# Patient Record
Sex: MALE | Race: WHITE | Employment: OTHER | URBAN - METROPOLITAN AREA
[De-identification: names, ages, dates, MRNs, and addresses within clinical notes are randomized per-mention and may not be internally consistent; named-entity substitution may affect disease eponyms.]

---

## 2018-03-06 RX ORDER — FENOFIBRATE 160 MG/1
160 TABLET ORAL DAILY
Qty: 30 TABLET | Refills: 1 | Status: SHIPPED | OUTPATIENT
Start: 2018-03-06 | End: 2018-04-23 | Stop reason: SDUPTHER

## 2018-03-06 RX ORDER — TESTOSTERONE CYPIONATE 200 MG/ML
200 INJECTION INTRAMUSCULAR
Qty: 2 ML | Refills: 1 | OUTPATIENT
Start: 2018-03-06 | End: 2018-04-23 | Stop reason: SDUPTHER

## 2018-03-06 NOTE — TELEPHONE ENCOUNTER
Spoke with the Patients wife and she stated that the patient needs a refill of the following medications. Fenofibrate 160 mg QD  Testosterone 200 mg/mL Inject 1 mL every 2 weeks    Patients wife stated that the patient has been out of medication for 2 months.

## 2018-03-12 RX ORDER — TESTOSTERONE CYPIONATE 200 MG/ML
INJECTION INTRAMUSCULAR
Qty: 1 ML | Refills: 0 | OUTPATIENT
Start: 2018-03-12

## 2018-04-23 ENCOUNTER — OFFICE VISIT (OUTPATIENT)
Dept: ENDOCRINOLOGY | Age: 63
End: 2018-04-23

## 2018-04-23 VITALS
DIASTOLIC BLOOD PRESSURE: 86 MMHG | OXYGEN SATURATION: 96 % | BODY MASS INDEX: 34.65 KG/M2 | SYSTOLIC BLOOD PRESSURE: 122 MMHG | HEIGHT: 70 IN | WEIGHT: 242 LBS | HEART RATE: 61 BPM

## 2018-04-23 DIAGNOSIS — E55.9 VITAMIN D DEFICIENCY: ICD-10-CM

## 2018-04-23 DIAGNOSIS — E66.9 CLASS 1 OBESITY WITH SERIOUS COMORBIDITY AND BODY MASS INDEX (BMI) OF 34.0 TO 34.9 IN ADULT, UNSPECIFIED OBESITY TYPE: ICD-10-CM

## 2018-04-23 DIAGNOSIS — E11.40 TYPE 2 DIABETES, CONTROLLED, WITH NEUROPATHY (HCC): Primary | ICD-10-CM

## 2018-04-23 DIAGNOSIS — E78.2 MIXED HYPERLIPIDEMIA: ICD-10-CM

## 2018-04-23 DIAGNOSIS — E23.0 HYPOGONADOTROPIC HYPOGONADISM (HCC): ICD-10-CM

## 2018-04-23 PROBLEM — E66.811 CLASS 1 OBESITY IN ADULT: Status: ACTIVE | Noted: 2018-04-23

## 2018-04-23 LAB — HBA1C MFR BLD: 6.7 %

## 2018-04-23 PROCEDURE — 99214 OFFICE O/P EST MOD 30 MIN: CPT | Performed by: INTERNAL MEDICINE

## 2018-04-23 PROCEDURE — 83036 HEMOGLOBIN GLYCOSYLATED A1C: CPT | Performed by: INTERNAL MEDICINE

## 2018-04-23 RX ORDER — FENOFIBRATE 160 MG/1
160 TABLET ORAL DAILY
Qty: 90 TABLET | Refills: 1 | Status: SHIPPED | OUTPATIENT
Start: 2018-04-23 | End: 2018-10-17 | Stop reason: SDUPTHER

## 2018-04-23 RX ORDER — CABERGOLINE 0.5 MG/1
0.25 TABLET ORAL
COMMUNITY
End: 2018-04-23 | Stop reason: ALTCHOICE

## 2018-04-23 RX ORDER — TRIAMCINOLONE ACETONIDE 1 MG/G
CREAM TOPICAL 2 TIMES DAILY
COMMUNITY
End: 2018-07-25 | Stop reason: ALTCHOICE

## 2018-04-23 RX ORDER — TESTOSTERONE CYPIONATE 200 MG/ML
200 INJECTION INTRAMUSCULAR
Qty: 2 ML | Refills: 3 | Status: SHIPPED | OUTPATIENT
Start: 2018-04-23 | End: 2018-11-21 | Stop reason: SDUPTHER

## 2018-04-23 RX ORDER — PROPRANOLOL HYDROCHLORIDE 80 MG/1
80 TABLET ORAL DAILY
COMMUNITY

## 2018-04-23 RX ORDER — CINNAMON BARK
500 POWDER (GRAM) MISCELLANEOUS DAILY
Status: ON HOLD | COMMUNITY
End: 2021-09-24

## 2018-04-23 ASSESSMENT — PATIENT HEALTH QUESTIONNAIRE - PHQ9
1. LITTLE INTEREST OR PLEASURE IN DOING THINGS: 0
2. FEELING DOWN, DEPRESSED OR HOPELESS: 0
SUM OF ALL RESPONSES TO PHQ9 QUESTIONS 1 & 2: 0
SUM OF ALL RESPONSES TO PHQ QUESTIONS 1-9: 0

## 2018-04-24 DIAGNOSIS — E23.0 HYPOGONADOTROPIC HYPOGONADISM (HCC): ICD-10-CM

## 2018-04-25 RX ORDER — TESTOSTERONE CYPIONATE 200 MG/ML
INJECTION INTRAMUSCULAR
Qty: 1 ML | Refills: 0 | OUTPATIENT
Start: 2018-04-25

## 2018-05-10 ENCOUNTER — TELEPHONE (OUTPATIENT)
Dept: ENDOCRINOLOGY | Age: 63
End: 2018-05-10

## 2018-05-10 DIAGNOSIS — E11.40 TYPE 2 DIABETES, CONTROLLED, WITH NEUROPATHY (HCC): Primary | ICD-10-CM

## 2018-07-19 DIAGNOSIS — E11.40 TYPE 2 DIABETES, CONTROLLED, WITH NEUROPATHY (HCC): ICD-10-CM

## 2018-07-19 DIAGNOSIS — E78.2 MIXED HYPERLIPIDEMIA: ICD-10-CM

## 2018-07-19 DIAGNOSIS — E23.0 HYPOGONADOTROPIC HYPOGONADISM (HCC): ICD-10-CM

## 2018-07-19 DIAGNOSIS — E55.9 VITAMIN D DEFICIENCY: ICD-10-CM

## 2018-07-19 LAB
A/G RATIO: 2 (ref 1.1–2.2)
ALBUMIN SERPL-MCNC: 4.9 G/DL (ref 3.4–5)
ALP BLD-CCNC: 93 U/L (ref 40–129)
ALT SERPL-CCNC: 67 U/L (ref 10–40)
ANION GAP SERPL CALCULATED.3IONS-SCNC: 13 MMOL/L (ref 3–16)
AST SERPL-CCNC: 48 U/L (ref 15–37)
BILIRUB SERPL-MCNC: 0.3 MG/DL (ref 0–1)
BUN BLDV-MCNC: 19 MG/DL (ref 7–20)
CALCIUM SERPL-MCNC: 9.7 MG/DL (ref 8.3–10.6)
CHLORIDE BLD-SCNC: 100 MMOL/L (ref 99–110)
CHOLESTEROL, TOTAL: 248 MG/DL (ref 0–199)
CO2: 25 MMOL/L (ref 21–32)
CREAT SERPL-MCNC: 0.8 MG/DL (ref 0.8–1.3)
CREATININE URINE: 139.7 MG/DL (ref 39–259)
GFR AFRICAN AMERICAN: >60
GFR NON-AFRICAN AMERICAN: >60
GLOBULIN: 2.4 G/DL
GLUCOSE BLD-MCNC: 151 MG/DL (ref 70–99)
HCT VFR BLD CALC: 47.9 % (ref 40.5–52.5)
HDLC SERPL-MCNC: 26 MG/DL (ref 40–60)
HEMOGLOBIN: 15.6 G/DL (ref 13.5–17.5)
LDL CHOLESTEROL CALCULATED: ABNORMAL MG/DL
LDL CHOLESTEROL DIRECT: 164 MG/DL
MCH RBC QN AUTO: 29.6 PG (ref 26–34)
MCHC RBC AUTO-ENTMCNC: 32.7 G/DL (ref 31–36)
MCV RBC AUTO: 90.5 FL (ref 80–100)
MICROALBUMIN UR-MCNC: 4.2 MG/DL
MICROALBUMIN/CREAT UR-RTO: 30.1 MG/G (ref 0–30)
PDW BLD-RTO: 14.4 % (ref 12.4–15.4)
PLATELET # BLD: 273 K/UL (ref 135–450)
PMV BLD AUTO: 8.8 FL (ref 5–10.5)
POTASSIUM SERPL-SCNC: 4.7 MMOL/L (ref 3.5–5.1)
RBC # BLD: 5.29 M/UL (ref 4.2–5.9)
SODIUM BLD-SCNC: 138 MMOL/L (ref 136–145)
TOTAL PROTEIN: 7.3 G/DL (ref 6.4–8.2)
TRIGL SERPL-MCNC: 419 MG/DL (ref 0–150)
VITAMIN D 25-HYDROXY: 52.9 NG/ML
VLDLC SERPL CALC-MCNC: ABNORMAL MG/DL
WBC # BLD: 7.9 K/UL (ref 4–11)

## 2018-07-20 LAB
ESTIMATED AVERAGE GLUCOSE: 162.8 MG/DL
HBA1C MFR BLD: 7.3 %

## 2018-07-25 ENCOUNTER — OFFICE VISIT (OUTPATIENT)
Dept: ENDOCRINOLOGY | Age: 63
End: 2018-07-25

## 2018-07-25 VITALS
DIASTOLIC BLOOD PRESSURE: 72 MMHG | SYSTOLIC BLOOD PRESSURE: 120 MMHG | WEIGHT: 247.2 LBS | OXYGEN SATURATION: 98 % | HEIGHT: 70 IN | HEART RATE: 62 BPM | BODY MASS INDEX: 35.39 KG/M2

## 2018-07-25 DIAGNOSIS — E55.9 VITAMIN D DEFICIENCY: ICD-10-CM

## 2018-07-25 DIAGNOSIS — E78.2 MIXED HYPERLIPIDEMIA: ICD-10-CM

## 2018-07-25 DIAGNOSIS — E23.0 HYPOGONADOTROPIC HYPOGONADISM (HCC): ICD-10-CM

## 2018-07-25 PROCEDURE — 99214 OFFICE O/P EST MOD 30 MIN: CPT | Performed by: INTERNAL MEDICINE

## 2018-07-25 NOTE — PROGRESS NOTES
José Castillo is a 66 y.o. male who presents for Type 2 diabetes mellitus. Current symptoms/problems include hyperglycemia and show no change. 1.Type 2 diabetes, uncontrolled, with neuropathy ([facility] [address] [E11.40, E11.65]    Diagnosed with Type 2 diabetes mellitus in 2008.     Comorbid conditions: hyperlipidemia and Neuropathy    Current diabetic medications include: metformin    Intolerance to diabetes medications: No     Weight trend: stable  Prior visit with dietician: yes  Current diet: on average, 3 meals per day  Current exercise: frequent     Current monitoring regimen: home blood tests - 1 times daily  Has brought blood glucose log/meter:  No  Home blood sugar records: fasting range:  and postprandial range:    Any episodes of hypoglycemia? No  Hypoglycemia frequency and time(s):    Does patient have Glucagon emergency kit? No  Does patient have rapid acting carbohydrate? No  Does patient wear a medic alert bracelet or necklace? No    2. Mixed hyperlipidemia  No muscle pain. Has cramps. 3. Hypogonadotropic hypogonadism (HCC)  Has weakness. 4. Vitamin D deficiency  No bone pain. 5. Class 1 obesity with serious comorbidity and body mass index (BMI) of 35.0 to 35.9 in adult, unspecified obesity type  Eats healthy, exercises.       Lab Results   Component Value Date    LABA1C 7.3 07/19/2018     Lab Results   Component Value Date    .8 07/19/2018         Past Medical History:   Diagnosis Date    Bilateral swelling of feet     Generalized headaches     Goiter     Hemorrhoids     Hiatal hernia     Hyperlipidemia     Impaired fasting glucose     Neuropathy (HCC)     Other and unspecified anterior pituitary hyperfunction     Other anterior pituitary disorders     Other testicular hypofunction     Other testicular hypofunction     Parkinson's disease     Reflux     Type II or unspecified type diabetes mellitus with neurological manifestations, uncontrolled(250.62)    

## 2018-07-27 LAB
REASON FOR REJECTION: NORMAL
REJECTED TEST: NORMAL

## 2018-07-30 ENCOUNTER — TELEPHONE (OUTPATIENT)
Dept: ENDOCRINOLOGY | Age: 63
End: 2018-07-30

## 2018-07-30 DIAGNOSIS — E23.0 HYPOGONADOTROPIC HYPOGONADISM (HCC): Primary | ICD-10-CM

## 2018-07-30 NOTE — TELEPHONE ENCOUNTER
Song Hassan called from [facility] to let us know that the test for the testosterone total for the pt was ruined with Fed-X, the dry ice failed. She wants to know should she call the pt back for another test? If so she will need a new order for it.  She can be reached at [phone]

## 2018-08-02 ENCOUNTER — TELEPHONE (OUTPATIENT)
Dept: ENDOCRINOLOGY | Age: 63
End: 2018-08-02

## 2018-08-15 DIAGNOSIS — E23.0 HYPOGONADOTROPIC HYPOGONADISM (HCC): ICD-10-CM

## 2018-08-17 LAB
SEX HORMONE BINDING GLOBULIN: 20 NMOL/L (ref 11–80)
TESTOSTERONE FREE-NONMALE: 43.9 PG/ML (ref 47–244)
TESTOSTERONE TOTAL: 179 NG/DL (ref 220–1000)

## 2018-10-17 DIAGNOSIS — E78.2 MIXED HYPERLIPIDEMIA: ICD-10-CM

## 2018-10-17 RX ORDER — FENOFIBRATE 160 MG/1
TABLET ORAL
Qty: 90 TABLET | Refills: 0 | Status: SHIPPED | OUTPATIENT
Start: 2018-10-17 | End: 2019-01-11 | Stop reason: SDUPTHER

## 2018-11-12 DIAGNOSIS — E11.40 TYPE 2 DIABETES, CONTROLLED, WITH NEUROPATHY (HCC): ICD-10-CM

## 2018-11-15 DIAGNOSIS — E23.0 HYPOGONADOTROPIC HYPOGONADISM (HCC): ICD-10-CM

## 2018-11-15 DIAGNOSIS — E55.9 VITAMIN D DEFICIENCY: ICD-10-CM

## 2018-11-15 DIAGNOSIS — E78.2 MIXED HYPERLIPIDEMIA: ICD-10-CM

## 2018-11-15 LAB
A/G RATIO: 1.8 (ref 1.1–2.2)
ALBUMIN SERPL-MCNC: 4.7 G/DL (ref 3.4–5)
ALP BLD-CCNC: 136 U/L (ref 40–129)
ALT SERPL-CCNC: 44 U/L (ref 10–40)
ANION GAP SERPL CALCULATED.3IONS-SCNC: 16 MMOL/L (ref 3–16)
AST SERPL-CCNC: 38 U/L (ref 15–37)
BILIRUB SERPL-MCNC: 0.3 MG/DL (ref 0–1)
BUN BLDV-MCNC: 18 MG/DL (ref 7–20)
CALCIUM SERPL-MCNC: 10.1 MG/DL (ref 8.3–10.6)
CHLORIDE BLD-SCNC: 97 MMOL/L (ref 99–110)
CHOLESTEROL, TOTAL: 278 MG/DL (ref 0–199)
CO2: 26 MMOL/L (ref 21–32)
CREAT SERPL-MCNC: 0.8 MG/DL (ref 0.8–1.3)
CREATININE URINE: 251.7 MG/DL (ref 39–259)
GFR AFRICAN AMERICAN: >60
GFR NON-AFRICAN AMERICAN: >60
GLOBULIN: 2.6 G/DL
GLUCOSE BLD-MCNC: 254 MG/DL (ref 70–99)
HCT VFR BLD CALC: 45.6 % (ref 40.5–52.5)
HDLC SERPL-MCNC: 23 MG/DL (ref 40–60)
HEMOGLOBIN: 15 G/DL (ref 13.5–17.5)
LDL CHOLESTEROL CALCULATED: ABNORMAL MG/DL
LDL CHOLESTEROL DIRECT: 154 MG/DL
MCH RBC QN AUTO: 29.6 PG (ref 26–34)
MCHC RBC AUTO-ENTMCNC: 33 G/DL (ref 31–36)
MCV RBC AUTO: 89.7 FL (ref 80–100)
MICROALBUMIN UR-MCNC: 35.2 MG/DL
MICROALBUMIN/CREAT UR-RTO: 139.8 MG/G (ref 0–30)
PDW BLD-RTO: 14.3 % (ref 12.4–15.4)
PLATELET # BLD: 241 K/UL (ref 135–450)
PMV BLD AUTO: 9.2 FL (ref 5–10.5)
POTASSIUM SERPL-SCNC: 5.3 MMOL/L (ref 3.5–5.1)
RBC # BLD: 5.08 M/UL (ref 4.2–5.9)
SODIUM BLD-SCNC: 139 MMOL/L (ref 136–145)
T4 FREE: 1.4 NG/DL (ref 0.9–1.8)
TOTAL PROTEIN: 7.3 G/DL (ref 6.4–8.2)
TRIGL SERPL-MCNC: 659 MG/DL (ref 0–150)
TSH SERPL DL<=0.05 MIU/L-ACNC: 1.78 UIU/ML (ref 0.27–4.2)
VITAMIN D 25-HYDROXY: 44.8 NG/ML
VLDLC SERPL CALC-MCNC: ABNORMAL MG/DL
WBC # BLD: 8.8 K/UL (ref 4–11)

## 2018-11-16 LAB
ESTIMATED AVERAGE GLUCOSE: 254.7 MG/DL
HBA1C MFR BLD: 10.5 %

## 2018-11-17 LAB
SEX HORMONE BINDING GLOBULIN: 23 NMOL/L (ref 11–80)
TESTOSTERONE FREE-NONMALE: 29.6 PG/ML (ref 47–244)
TESTOSTERONE TOTAL: 131 NG/DL (ref 220–1000)

## 2018-11-20 PROBLEM — E66.812 CLASS 2 OBESITY WITH BODY MASS INDEX (BMI) OF 35.0 TO 35.9 IN ADULT: Status: ACTIVE | Noted: 2018-04-23

## 2018-11-21 ENCOUNTER — OFFICE VISIT (OUTPATIENT)
Dept: ENDOCRINOLOGY | Age: 63
End: 2018-11-21
Payer: MEDICARE

## 2018-11-21 VITALS
SYSTOLIC BLOOD PRESSURE: 138 MMHG | DIASTOLIC BLOOD PRESSURE: 79 MMHG | OXYGEN SATURATION: 94 % | HEIGHT: 70 IN | BODY MASS INDEX: 33.82 KG/M2 | WEIGHT: 236.2 LBS

## 2018-11-21 DIAGNOSIS — E66.9 CLASS 1 OBESITY WITH SERIOUS COMORBIDITY AND BODY MASS INDEX (BMI) OF 33.0 TO 33.9 IN ADULT, UNSPECIFIED OBESITY TYPE: ICD-10-CM

## 2018-11-21 DIAGNOSIS — E55.9 VITAMIN D DEFICIENCY: ICD-10-CM

## 2018-11-21 DIAGNOSIS — E78.2 MIXED HYPERLIPIDEMIA: ICD-10-CM

## 2018-11-21 DIAGNOSIS — E11.40 TYPE 2 DIABETES, CONTROLLED, WITH NEUROPATHY (HCC): ICD-10-CM

## 2018-11-21 DIAGNOSIS — E23.0 HYPOGONADOTROPIC HYPOGONADISM (HCC): ICD-10-CM

## 2018-11-21 PROCEDURE — 99214 OFFICE O/P EST MOD 30 MIN: CPT | Performed by: INTERNAL MEDICINE

## 2018-11-21 RX ORDER — ICOSAPENT ETHYL 1000 MG/1
2 CAPSULE ORAL 2 TIMES DAILY
Qty: 120 CAPSULE | Refills: 3 | Status: SHIPPED | OUTPATIENT
Start: 2018-11-21 | End: 2019-03-29 | Stop reason: SDUPTHER

## 2018-11-21 RX ORDER — TESTOSTERONE CYPIONATE 200 MG/ML
200 INJECTION INTRAMUSCULAR
Qty: 10 ML | Refills: 1 | Status: SHIPPED | OUTPATIENT
Start: 2018-11-21 | End: 2019-04-16

## 2018-11-21 NOTE — PROGRESS NOTES
Robert Gill is a 66 y.o. male who presents for Type 2 diabetes mellitus. Current symptoms/problems include hyperglycemia and show no change. 1.Type 2 diabetes mellitus, uncontrolled, with neuropathy ([facility] [address] [E11.40, E11.65]    Diagnosed with Type 2 diabetes mellitus in 2008.     Comorbid conditions: hyperlipidemia and Neuropathy    Current diabetic medications include: metformin    Intolerance to diabetes medications: No     Weight trend: stable  Prior visit with dietician: yes  Current diet: on average, 3 meals per day  Current exercise: frequent     Current monitoring regimen: home blood tests - 1 times daily  Has brought blood glucose log/meter:  No  Home blood sugar records: fasting range: 120-130  and postprandial range: 120-130   Any episodes of hypoglycemia? No  Hypoglycemia frequency and time(s):    Does patient have Glucagon emergency kit? No  Does patient have rapid acting carbohydrate? No  Does patient wear a medic alert bracelet or necklace? No    2. Mixed hyperlipidemia  No muscle pain. Has cramps. 3. Hypogonadotropic hypogonadism (HCC)  Has weakness. 4. Vitamin D deficiency  No bone pain. 5. Class 1 obesity with serious comorbidity and body mass index (BMI) of 33.0 to 33.9 in adult, unspecified obesity type  Eats healthy, exercises.       Lab Results   Component Value Date    LABA1C 10.5 11/15/2018     Lab Results   Component Value Date    .7 11/15/2018         Past Medical History:   Diagnosis Date    Bilateral swelling of feet     Generalized headaches     Goiter     Hemorrhoids     Hiatal hernia     Hyperlipidemia     Impaired fasting glucose     Neuropathy     Other and unspecified anterior pituitary hyperfunction     Other anterior pituitary disorders     Other testicular hypofunction     Other testicular hypofunction     Parkinson's disease     Reflux     Type II or unspecified type diabetes mellitus with neurological manifestations, uncontrolled(250.62) injection Inject 1 mL into the muscle every 14 days for 92 days. . 10 mL 1    metFORMIN (GLUCOPHAGE) 1000 MG tablet Take 1 tablet by mouth 2 times daily (with meals) 180 tablet 1    Icosapent Ethyl (VASCEPA) 1 g CAPS capsule Take 2 capsules by mouth 2 times daily 120 capsule 3    SITagliptin (JANUVIA) 100 MG tablet Take 1 tablet by mouth daily 90 tablet 1    fenofibrate 160 MG tablet take 1 tablet by mouth once daily 90 tablet 0    Cholecalciferol (VITAMIN D3) 5000 units TABS Take 5,000 Units by mouth daily      propranolol (INDERAL) 80 MG tablet Take 80 mg by mouth daily       Probiotic Product (PROBIOTIC-10 PO) Take 10 mg by mouth daily      Cinnamon Bark POWD 500 mg by Does not apply route daily      carbidopa-levodopa (SINEMET)  MG per tablet Take 2 tablets by mouth 4 times daily 1.5 tablets       aspirin 81 MG EC tablet Take 81 mg by mouth daily. Last dose 3/9/15      rosuvastatin (CRESTOR) 20 MG tablet Take 1 tablet by mouth daily. 30 tablet 6     No current facility-administered medications for this visit.       No Known Allergies  Family Status   Relation Status    Mother     Father    [employer] PAunt (Not Specified)   [employer] Sister (Not Specified)    Brother (Not Specified)    MAunt (Not Specified)    MUnc (Not Specified)    MGM (Not Specified)    MGF (Not Specified)    Neg Hx (Not Specified)       Review of Systems  Constitutional: has fatigue, no fever, no recent weight gain, no recent weight loss, no changes in appetite  Eyes: no eye pain, no change in vision, no eye redness, no eye irritation, no double vision  Ears, nose, throat: no nasal congestion, no sore throat, no earache, no decrease in hearing, no hoarseness, no dry mouth, no sinus problems, has difficulty swallowing, no neck lumps, no dental problems, no mouth sores, no ringing in ears  Pulmonary: no shortness of breath, no wheezing, no dyspnea on exertion, no cough  Cardiovascular: no chest pain, no lower extremity edema, no orthopnea, no intermittent leg claudication, no palpitations  Gastrointestinal: no abdominal pain, no nausea, no vomiting, no diarrhea, no constipation, no dysphagia, no heartburn, no bloating  Genitourinary: no dysuria, no urinary incontinence, no urinary hesitancy, no urinary frequency, no feelings of urinary urgency, no nocturia  Musculoskeletal: no joint swelling, no joint stiffness, has joint pain, has muscle cramps, has muscle pain, no bone pain  Integument/Breast: no skin rashes, no skin lesions, no itching, no dry skin, no breast pain, no breast mass, no skin hives, no skin discoloration, no nipple discharge  Neurological: no numbness, no tingling, no weakness, no confusion, has headaches, has dizziness, no fainting, has tremors, has decrease in memory, has balance problems  Psychiatric: has anxiety, no depression, has insomnia  Hematologic/Lymphatic: no tendency for easy bleeding, no swollen lymph nodes, no tendency for easy bruising  Immunology: no seasonal allergies, no frequent infections, no frequent illnesses  Endocrine: no temperature intolerance     OBJECTIVE:  /79 (Site: Left Upper Arm, Position: Sitting, Cuff Size: Medium Adult)   Ht 5' 10\" (1.778 m)   Wt 236 lb 3.2 oz (107.1 kg)   SpO2 94%   BMI 33.89 kg/m²      Constitutional: no acute distress, well appearing and well nourished  Psychiatric: oriented to person, place and time, judgement and insight and normal, recent and remote memory and intact and mood and affect are normal  Skin: skin and subcutaneous tissue is normal without mass, normal turgor  Head and Face: examination of head and face revealed no abnormalities  Eyes: no lid or conjunctival swelling, erythema or discharge, pupils are normal, equal, round, reactive to light  Ears/Nose: external inspection of ears and nose revealed no abnormalities, hearing is grossly normal  Oropharynx/Mouth/Face: lips, tongue and gums are normal with no lesions, the voice quality was

## 2019-01-11 DIAGNOSIS — E78.2 MIXED HYPERLIPIDEMIA: ICD-10-CM

## 2019-01-11 RX ORDER — FENOFIBRATE 160 MG/1
TABLET ORAL
Qty: 90 TABLET | Refills: 1 | Status: SHIPPED | OUTPATIENT
Start: 2019-01-11 | End: 2019-04-16

## 2019-04-01 RX ORDER — ICOSAPENT ETHYL 1000 MG/1
CAPSULE ORAL
Qty: 120 CAPSULE | Refills: 2 | Status: SHIPPED | OUTPATIENT
Start: 2019-04-01 | End: 2019-04-16

## 2019-04-09 ENCOUNTER — TELEPHONE (OUTPATIENT)
Dept: ENDOCRINOLOGY | Age: 64
End: 2019-04-09

## 2019-04-09 DIAGNOSIS — E78.2 MIXED HYPERLIPIDEMIA: ICD-10-CM

## 2019-04-09 DIAGNOSIS — E55.9 VITAMIN D DEFICIENCY: ICD-10-CM

## 2019-04-09 DIAGNOSIS — E23.0 HYPOGONADOTROPIC HYPOGONADISM (HCC): ICD-10-CM

## 2019-04-09 LAB
A/G RATIO: 1.6 (ref 1.1–2.2)
ALBUMIN SERPL-MCNC: 4.6 G/DL (ref 3.4–5)
ALP BLD-CCNC: 132 U/L (ref 40–129)
ALT SERPL-CCNC: 51 U/L (ref 10–40)
ANION GAP SERPL CALCULATED.3IONS-SCNC: 15 MMOL/L (ref 3–16)
AST SERPL-CCNC: 43 U/L (ref 15–37)
BILIRUB SERPL-MCNC: 0.5 MG/DL (ref 0–1)
BUN BLDV-MCNC: 14 MG/DL (ref 7–20)
CALCIUM SERPL-MCNC: 9.7 MG/DL (ref 8.3–10.6)
CHLORIDE BLD-SCNC: 98 MMOL/L (ref 99–110)
CHOLESTEROL, TOTAL: 311 MG/DL (ref 0–199)
CO2: 24 MMOL/L (ref 21–32)
CREAT SERPL-MCNC: 0.7 MG/DL (ref 0.8–1.3)
CREATININE URINE: 224 MG/DL (ref 39–259)
GFR AFRICAN AMERICAN: >60
GFR NON-AFRICAN AMERICAN: >60
GLOBULIN: 2.8 G/DL
GLUCOSE BLD-MCNC: 275 MG/DL (ref 70–99)
HCT VFR BLD CALC: 47.2 % (ref 40.5–52.5)
HDLC SERPL-MCNC: 25 MG/DL (ref 40–60)
HEMOGLOBIN: 15.6 G/DL (ref 13.5–17.5)
LDL CHOLESTEROL CALCULATED: ABNORMAL MG/DL
LDL CHOLESTEROL DIRECT: 141 MG/DL
MCH RBC QN AUTO: 28.7 PG (ref 26–34)
MCHC RBC AUTO-ENTMCNC: 33 G/DL (ref 31–36)
MCV RBC AUTO: 86.9 FL (ref 80–100)
MICROALBUMIN UR-MCNC: 26.6 MG/DL
MICROALBUMIN/CREAT UR-RTO: 118.8 MG/G (ref 0–30)
PDW BLD-RTO: 14.4 % (ref 12.4–15.4)
PLATELET # BLD: 243 K/UL (ref 135–450)
PMV BLD AUTO: 9.3 FL (ref 5–10.5)
POTASSIUM SERPL-SCNC: 4.7 MMOL/L (ref 3.5–5.1)
RBC # BLD: 5.43 M/UL (ref 4.2–5.9)
SODIUM BLD-SCNC: 137 MMOL/L (ref 136–145)
TOTAL PROTEIN: 7.4 G/DL (ref 6.4–8.2)
TRIGL SERPL-MCNC: 714 MG/DL (ref 0–150)
VITAMIN D 25-HYDROXY: 40.8 NG/ML
VLDLC SERPL CALC-MCNC: ABNORMAL MG/DL
WBC # BLD: 7.7 K/UL (ref 4–11)

## 2019-04-10 LAB
ESTIMATED AVERAGE GLUCOSE: 329.3 MG/DL
HBA1C MFR BLD: 13.1 %

## 2019-04-10 NOTE — TELEPHONE ENCOUNTER
I reviewed lab results. Glucose high over 200. Triglycerides high 714. Make sure follow the diet. I will discuss during appointment. let me know if any questions.

## 2019-04-12 LAB
SEX HORMONE BINDING GLOBULIN: 27 NMOL/L (ref 11–80)
TESTOSTERONE FREE-NONMALE: 31 PG/ML (ref 47–244)
TESTOSTERONE TOTAL: 148 NG/DL (ref 220–1000)

## 2019-04-16 ENCOUNTER — OFFICE VISIT (OUTPATIENT)
Dept: ENDOCRINOLOGY | Age: 64
End: 2019-04-16
Payer: MEDICARE

## 2019-04-16 ENCOUNTER — TELEPHONE (OUTPATIENT)
Dept: ENDOCRINOLOGY | Age: 64
End: 2019-04-16

## 2019-04-16 VITALS
HEART RATE: 67 BPM | SYSTOLIC BLOOD PRESSURE: 129 MMHG | HEIGHT: 70 IN | BODY MASS INDEX: 34.3 KG/M2 | DIASTOLIC BLOOD PRESSURE: 82 MMHG | WEIGHT: 239.6 LBS

## 2019-04-16 DIAGNOSIS — E23.0 HYPOGONADOTROPIC HYPOGONADISM (HCC): ICD-10-CM

## 2019-04-16 DIAGNOSIS — E66.9 CLASS 1 OBESITY WITH SERIOUS COMORBIDITY AND BODY MASS INDEX (BMI) OF 34.0 TO 34.9 IN ADULT, UNSPECIFIED OBESITY TYPE: ICD-10-CM

## 2019-04-16 DIAGNOSIS — E55.9 VITAMIN D DEFICIENCY: ICD-10-CM

## 2019-04-16 DIAGNOSIS — E78.2 MIXED HYPERLIPIDEMIA: ICD-10-CM

## 2019-04-16 DIAGNOSIS — E11.40 TYPE 2 DIABETES, CONTROLLED, WITH NEUROPATHY (HCC): ICD-10-CM

## 2019-04-16 PROCEDURE — 99214 OFFICE O/P EST MOD 30 MIN: CPT | Performed by: INTERNAL MEDICINE

## 2019-04-16 RX ORDER — BLOOD-GLUCOSE METER
1 KIT MISCELLANEOUS 4 TIMES DAILY
Qty: 400 STRIP | Refills: 5 | Status: SHIPPED | OUTPATIENT
Start: 2019-04-16 | End: 2019-04-17

## 2019-04-16 RX ORDER — LISINOPRIL 10 MG/1
10 TABLET ORAL DAILY
Qty: 90 TABLET | Refills: 1 | Status: SHIPPED | OUTPATIENT
Start: 2019-04-16 | End: 2019-10-03 | Stop reason: SDUPTHER

## 2019-04-16 RX ORDER — ICOSAPENT ETHYL 1000 MG/1
CAPSULE ORAL
Qty: 360 CAPSULE | Refills: 1 | Status: SHIPPED | OUTPATIENT
Start: 2019-04-16 | End: 2019-10-21 | Stop reason: SDUPTHER

## 2019-04-16 RX ORDER — BLOOD-GLUCOSE METER
1 KIT MISCELLANEOUS 4 TIMES DAILY
Qty: 1 DEVICE | Refills: 0 | Status: SHIPPED | OUTPATIENT
Start: 2019-04-16

## 2019-04-16 RX ORDER — TESTOSTERONE CYPIONATE 200 MG/ML
200 INJECTION INTRAMUSCULAR
Qty: 10 ML | Refills: 1 | Status: SHIPPED | OUTPATIENT
Start: 2019-04-16 | End: 2019-06-06 | Stop reason: SDUPTHER

## 2019-04-16 RX ORDER — FENOFIBRATE 160 MG/1
TABLET ORAL
Qty: 90 TABLET | Refills: 1 | Status: SHIPPED | OUTPATIENT
Start: 2019-04-16 | End: 2019-12-24

## 2019-04-16 RX ORDER — LANCETS 28 GAUGE
1 EACH MISCELLANEOUS 4 TIMES DAILY
Qty: 400 EACH | Refills: 5 | Status: SHIPPED | OUTPATIENT
Start: 2019-04-16

## 2019-04-16 NOTE — TELEPHONE ENCOUNTER
Priya states that the Freestyle is not approved per insurance but that One Touch is, they requested a new script for the One Touch system for the [phone]

## 2019-04-16 NOTE — PROGRESS NOTES
Unknown Ormond is a 66 y.o. male who presents for Type 2 diabetes mellitus. Current symptoms/problems include hyperglycemia and show worsening. 1.  Type 2 diabetes mellitus, uncontrolled, with neuropathy ([facility] [address] [E11.40, E11.65]    Diagnosed with Type 2 diabetes mellitus in 2008.     Comorbid conditions: hyperlipidemia and Neuropathy    Current diabetic medications include: metformin, Januvia    Intolerance to diabetes medications: No     Weight trend: stable  Prior visit with dietician: yes  Current diet: on average, 3 meals per day  Current exercise: frequent     Current monitoring regimen: home blood tests - 1 times daily before meter bromariana  Has brought blood glucose log/meter:  No  Home blood sugar records: fasting range:   and postprandial range:   Any episodes of hypoglycemia? No  Hypoglycemia frequency and time(s):    Does patient have Glucagon emergency kit? No  Does patient have rapid acting carbohydrate? No  Does patient wear a medic alert bracelet or necklace? No    2. Mixed hyperlipidemia  No muscle pain. Has cramps. 3. Hypogonadotropic hypogonadism (HCC)  Has weakness. 4. Vitamin D deficiency  No bone pain. 5. Class 1 obesity with serious comorbidity and body mass index (BMI) of 34.0 to 34.9 in adult, unspecified obesity type  Eats healthy, exercises.       Lab Results   Component Value Date    LABA1C 13.1 04/09/2019     Lab Results   Component Value Date    .3 04/09/2019         Past Medical History:   Diagnosis Date    Bilateral swelling of feet     Generalized headaches     Goiter     Hemorrhoids     Hiatal hernia     Hyperlipidemia     Impaired fasting glucose     Neuropathy     Other and unspecified anterior pituitary hyperfunction     Other anterior pituitary disorders     Other testicular hypofunction     Other testicular hypofunction     Parkinson's disease     Reflux     Type II or unspecified type diabetes mellitus with neurological manifestations, uncontrolled(250.62)     Type II or unspecified type diabetes mellitus without mention of complication, uncontrolled       Patient Active Problem List   Diagnosis    Mixed hyperlipidemia    Diabetic polyneuropathy ([facility] [address]    Hypogonadotropic hypogonadism ([facility] [address]    Type 2 diabetes mellitus, uncontrolled, with neuropathy ([facility] [address]    Parkinson's disease ([facility] [address]    Atypical Parkinsonism ([facility] [address]    Proteinuria    Vitamin D deficiency    Class 1 obesity with body mass index (BMI) of 33.0 to 33.9 in adult    Uncontrolled type 2 diabetes mellitus with diabetic nephropathy [facility])     Past Surgical History:   Procedure Laterality Date    BRAIN SURGERY      brainstem    CATARACT REMOVAL  12-10-14    COLONOSCOPY  ca 2009    no polyps.      EXTERNAL EAR SURGERY      EYE SURGERY      FOOT SURGERY      SKIN BIOPSY      SKIN BIOPSY      TONSILLECTOMY      UMBILICAL HERNIA REPAIR  6/02/34    umbillical hernia repair with mesh     Social History     Socioeconomic History    Marital status:      Spouse name: Not on file    Number of children: 2    Years of education: Not on file    Highest education level: Not on file   Occupational History    Occupation: retired    Social Needs    Financial resource strain: Not on file    Food insecurity:     Worry: Not on file     Inability: Not on file   [place] needs:     Medical: Not on file     Non-medical: Not on file   Tobacco Use    Smoking status: Never Smoker    Smokeless tobacco: Never Used   Substance and Sexual Activity    Alcohol use: No    Drug use: No    Sexual activity: Yes     Partners: Female   Lifestyle    Physical activity:     Days per week: Not on file     Minutes per session: Not on file    Stress: Not on file   Relationships    Social connections:     Talks on phone: Not on file     Gets together: Not on file     Attends Denominational service: Not on file     Active member of club or organization: Not on file     Attends meetings of clubs or organizations: Not on file     Relationship status: Not on file    Intimate partner violence:     Fear of current or ex partner: Not on file     Emotionally abused: Not on file     Physically abused: Not on file     Forced sexual activity: Not on file   Other Topics Concern    Not on file   Social History Narrative    Not on file     Family History   Problem Relation Age of Onset    Diabetes Mother     Thyroid Disease Paternal Aunt     Cancer Paternal Aunt     Diabetes Sister     Thyroid Disease Sister     Stroke Brother     Diabetes Maternal Aunt     Diabetes Maternal Uncle     Diabetes Maternal Grandmother     Diabetes Maternal Grandfather     High Blood Pressure Neg Hx     Heart Disease Neg Hx      Current Outpatient Medications   Medication Sig Dispense Refill    lisinopril (PRINIVIL;ZESTRIL) 10 MG tablet Take 1 tablet by mouth daily 90 tablet 1    VASCEPA 1 g CAPS capsule TAKE 2 CAPSULES BY MOUTH TWO TIMES A  capsule 1    fenofibrate 160 MG tablet TAKE 1 TABLET BY MOUTH ONE TIME A DAY 90 tablet 1    testosterone cypionate (DEPOTESTOTERONE CYPIONATE) 200 MG/ML injection Inject 1 mL into the muscle every 14 days for 92 days. 10 mL 1    metFORMIN (GLUCOPHAGE) 1000 MG tablet Take 1 tablet by mouth 2 times daily (with meals) 180 tablet 1    SITagliptin (JANUVIA) 100 MG tablet Take 1 tablet by mouth daily 90 tablet 1    empagliflozin (JARDIANCE) 25 MG tablet Take 1 tablet by mouth daily 90 tablet 1    FREESTYLE LANCETS MISC 1 Units by Does not apply route 4 times daily 400 each 5    FREESTYLE LITE strip 1 each by In Vitro route 4 times daily As needed.  400 strip 5    Blood Glucose Monitoring Suppl (FREESTYLE LITE) LIDIA 1 Device by Does not apply route 4 times daily 1 Device 0    Cholecalciferol (VITAMIN D3) 5000 units TABS Take 5,000 Units by mouth daily      propranolol (INDERAL) 80 MG tablet Take 80 mg by mouth daily       Probiotic Product (PROBIOTIC-10 PO) Take 10 mg by mouth daily      Cinnamon Bark POWD 500 mg by Does not apply route daily      carbidopa-levodopa (SINEMET)  MG per tablet Take 2 tablets by mouth 4 times daily 1.5 tablets       aspirin 81 MG EC tablet Take 81 mg by mouth daily. Last dose 3/9/15       No current facility-administered medications for this visit.       No Known Allergies  Family Status   Relation Name Status    Mother      Father     [de-identified]  (Not Specified)    Sister  (Not Specified)    Brother  (Not Specified)    MAunt  (Not Specified)    MUnc  (Not Specified)    MGM  (Not Specified)    MGF  (Not Specified)    Neg Hx  (Not Specified)       Review of Systems  Constitutional: has fatigue, no fever, no recent weight gain, no recent weight loss, no changes in appetite  Eyes: no eye pain, no change in vision, no eye redness, no eye irritation, no double vision  Ears, nose, throat: no nasal congestion, no sore throat, no earache, no decrease in hearing, no hoarseness, no dry mouth, no sinus problems, has difficulty swallowing, no neck lumps, no dental problems, no mouth sores, no ringing in ears  Pulmonary: no shortness of breath, no wheezing, no dyspnea on exertion, no cough  Cardiovascular: no chest pain, no lower extremity edema, no orthopnea, no intermittent leg claudication, no palpitations  Gastrointestinal: no abdominal pain, no nausea, no vomiting, no diarrhea, no constipation, no dysphagia, no heartburn, no bloating  Genitourinary: no dysuria, no urinary incontinence, no urinary hesitancy, no urinary frequency, no feelings of urinary urgency, no nocturia  Musculoskeletal: no joint swelling, no joint stiffness, has joint pain, has muscle cramps, has muscle pain, no bone pain  Integument/Breast: no skin rashes, no skin lesions, no itching, no dry skin, no breast pain, no breast mass, no skin hives, no skin discoloration, no nipple discharge  Neurological: no numbness, no tingling, no weakness, no confusion, has headaches, has dizziness, no fainting, has tremors, has decrease in memory, has balance problems  Psychiatric: has anxiety, no depression, has insomnia  Hematologic/Lymphatic: no tendency for easy bleeding, no swollen lymph nodes, no tendency for easy bruising  Immunology: no seasonal allergies, no frequent infections, no frequent illnesses  Endocrine: no temperature intolerance     OBJECTIVE:  /82 (Site: Left Upper Arm, Position: Sitting, Cuff Size: Medium Adult)   Pulse 67   Ht 5' 10\" (1.778 m)   Wt 239 lb 9.6 oz (108.7 kg)   BMI 34.38 kg/m²      Wt Readings from Last 3 Encounters:   04/16/19 239 lb 9.6 oz (108.7 kg)   11/21/18 236 lb 3.2 oz (107.1 kg)   07/25/18 247 lb 3.2 oz (112.1 kg)         Constitutional: no acute distress, well appearing and well nourished  Psychiatric: oriented to person, place and time, judgement and insight and normal, recent and remote memory and intact and mood and affect are normal  Skin: skin and subcutaneous tissue is normal without mass, normal turgor  Head and Face: examination of head and face revealed no abnormalities  Eyes: no lid or conjunctival swelling, erythema or discharge, pupils are normal, equal, round, reactive to light  Ears/Nose: external inspection of ears and nose revealed no abnormalities, hearing is grossly normal  Oropharynx/Mouth/Face: lips, tongue and gums are normal with no lesions, the voice quality was normal  Neck: neck is supple and symmetric, with midline trachea and no masses, thyroid is normal  Lymphatics: normal cervical lymph nodes, normal supraclavicular nodes  Pulmonary: no increased work of breathing or signs of respiratory distress, lungs are clear to auscultation  Cardiovascular: normal heart rate and rhythm, normal S1 and S2, no murmurs and pedal pulses and 2+ bilaterally, 1+ edema  Abdomen: abdomen is soft, non-tender with no masses  Musculoskeletal: normal gait and station and exam of the digits and nails are normal  Neurological: normal coordination and normal general cortical function    Visual inspection:  Deformity/amputation: absent  Skin lesions/pre-ulcerative calluses: absent  Edema: right- 1+, left- 1+    Sensory exam:  Monofilament sensation: abnormal on the right, normal on the left  (minimum of 5 random plantar locations tested, avoiding callused areas - > 1 area with absence of sensation is + for neuropathy)     Plus at least one of the following:  Pulses: normal  Proprioception: abnormal on the right, normal on the left  Vibration (128 Hz): absent on the right, decreased on the left  High risk feet    ASSESSMENT/PLAN:  1. Type 2 diabetes, uncontrolled, with neuropathy (HCC)  Worsening. Check blood glucose 4 times daily. Refused insulin. HbA1C 13.1  Takes care of his wife after her back and hip surgery. Start Jardiance 25 mg daily. - Januvia 100 mg  - metFORMIN (GLUCOPHAGE) 1000 MG tablet; Take 1 tablet by mouth 2 times daily (with meals)  Dispense: 180 tablet; Refill: 1  - Hemoglobin A1C; Future  - Comprehensive Metabolic Panel; Future  - Microalbumin / Creatinine Urine Ratio; Future    2. Mixed hyperlipidemia  , , HDL 25  Worsening of triglycerides. Stopped rosuvastatin and followed lipid panel. Uncontrolled. Wants to try diet. Refuses medication, understands risks. Patient has muscle weakness, pain and cramping. Has Parkinson's.  - fenofibrate 160 MG tablet; Take 1 tablet by mouth daily  Dispense: 90 tablet; Refill: 1  Discussed pancreatitis risk  - Lipid Panel; Future    3. Hypogonadotropic hypogonadism (HCC)  Uncontrolled. Skipped injections. Testosterone 148.  - testosterone cypionate (DEPOTESTOTERONE CYPIONATE) 200 MG/ML injection; Inject 1 mL into the muscle every 14 days. - CBC; Future  - Testosterone, free, total; Future    4. Vitamin D deficiency  25OH vit D 40. Supplements. - Vitamin D 25 Hydroxy; Future    5.  Class 1 obesity with serious comorbidity and body mass

## 2019-04-17 RX ORDER — LANCETS 33 GAUGE
1 EACH MISCELLANEOUS 4 TIMES DAILY
Qty: 120 EACH | Refills: 2 | Status: SHIPPED | OUTPATIENT
Start: 2019-04-17 | End: 2019-06-25 | Stop reason: SDUPTHER

## 2019-04-17 RX ORDER — BLOOD-GLUCOSE METER
EACH MISCELLANEOUS
Qty: 1 KIT | Refills: 0 | Status: SHIPPED | OUTPATIENT
Start: 2019-04-17

## 2019-04-17 NOTE — TELEPHONE ENCOUNTER
Spoke with pharmacy and clarified what type of One Touch system. Pharmacy advised he must use the Onetouch Ultra system.

## 2019-06-06 DIAGNOSIS — E23.0 HYPOGONADOTROPIC HYPOGONADISM (HCC): ICD-10-CM

## 2019-06-08 RX ORDER — TESTOSTERONE CYPIONATE 200 MG/ML
INJECTION INTRAMUSCULAR
Qty: 6 ML | Refills: 0 | Status: SHIPPED | OUTPATIENT
Start: 2019-06-08 | End: 2019-06-10 | Stop reason: SDUPTHER

## 2019-06-10 RX ORDER — TESTOSTERONE CYPIONATE 200 MG/ML
INJECTION INTRAMUSCULAR
Qty: 10 ML | Refills: 0 | Status: SHIPPED | OUTPATIENT
Start: 2019-06-10 | End: 2019-12-27

## 2019-06-25 RX ORDER — LANCETS 33 GAUGE
EACH MISCELLANEOUS
Qty: 100 EACH | Refills: 5 | Status: SHIPPED | OUTPATIENT
Start: 2019-06-25 | End: 2019-11-11 | Stop reason: SDUPTHER

## 2019-07-12 DIAGNOSIS — E23.0 HYPOGONADOTROPIC HYPOGONADISM (HCC): ICD-10-CM

## 2019-07-12 DIAGNOSIS — E78.2 MIXED HYPERLIPIDEMIA: ICD-10-CM

## 2019-07-12 DIAGNOSIS — E55.9 VITAMIN D DEFICIENCY: ICD-10-CM

## 2019-07-12 LAB
A/G RATIO: 2 (ref 1.1–2.2)
ALBUMIN SERPL-MCNC: 4.9 G/DL (ref 3.4–5)
ALP BLD-CCNC: 91 U/L (ref 40–129)
ALT SERPL-CCNC: 35 U/L (ref 10–40)
ANION GAP SERPL CALCULATED.3IONS-SCNC: 17 MMOL/L (ref 3–16)
AST SERPL-CCNC: <5 U/L (ref 15–37)
BILIRUB SERPL-MCNC: <0.2 MG/DL (ref 0–1)
BUN BLDV-MCNC: 22 MG/DL (ref 7–20)
CALCIUM SERPL-MCNC: 10.3 MG/DL (ref 8.3–10.6)
CHLORIDE BLD-SCNC: 100 MMOL/L (ref 99–110)
CHOLESTEROL, TOTAL: 308 MG/DL (ref 0–199)
CO2: 22 MMOL/L (ref 21–32)
CREAT SERPL-MCNC: 0.7 MG/DL (ref 0.8–1.3)
CREATININE URINE: 57 MG/DL (ref 39–259)
GFR AFRICAN AMERICAN: >60
GFR NON-AFRICAN AMERICAN: >60
GLOBULIN: 2.5 G/DL
GLUCOSE BLD-MCNC: 156 MG/DL (ref 70–99)
HCT VFR BLD CALC: 45.5 % (ref 40.5–52.5)
HDLC SERPL-MCNC: 22 MG/DL (ref 40–60)
HEMOGLOBIN: 15.6 G/DL (ref 13.5–17.5)
LDL CHOLESTEROL CALCULATED: ABNORMAL MG/DL
LDL CHOLESTEROL DIRECT: 115 MG/DL
MCH RBC QN AUTO: 30.9 PG (ref 26–34)
MCHC RBC AUTO-ENTMCNC: 34.2 G/DL (ref 31–36)
MCV RBC AUTO: 90.3 FL (ref 80–100)
MICROALBUMIN UR-MCNC: <1.2 MG/DL
MICROALBUMIN/CREAT UR-RTO: NORMAL MG/G (ref 0–30)
PDW BLD-RTO: 14.5 % (ref 12.4–15.4)
PLATELET # BLD: 287 K/UL (ref 135–450)
PMV BLD AUTO: 9 FL (ref 5–10.5)
POTASSIUM SERPL-SCNC: 4.7 MMOL/L (ref 3.5–5.1)
RBC # BLD: 5.04 M/UL (ref 4.2–5.9)
SODIUM BLD-SCNC: 139 MMOL/L (ref 136–145)
TOTAL PROTEIN: 7.4 G/DL (ref 6.4–8.2)
TRIGL SERPL-MCNC: 1222 MG/DL (ref 0–150)
VITAMIN D 25-HYDROXY: 40.2 NG/ML
VLDLC SERPL CALC-MCNC: ABNORMAL MG/DL
WBC # BLD: 7.5 K/UL (ref 4–11)

## 2019-07-13 LAB
ESTIMATED AVERAGE GLUCOSE: 171.4 MG/DL
HBA1C MFR BLD: 7.6 %

## 2019-07-16 LAB
C-PEPTIDE: 9 NG/ML (ref 1.1–4.4)
INSULIN COMMENT: NORMAL
INSULIN REFERENCE RANGE:: NORMAL
INSULIN: 62.7 MU/L
SEX HORMONE BINDING GLOBULIN: 26 NMOL/L (ref 11–80)
TESTOSTERONE FREE-NONMALE: 35.2 PG/ML (ref 47–244)
TESTOSTERONE TOTAL: 164 NG/DL (ref 220–1000)

## 2019-08-04 ENCOUNTER — TELEPHONE (OUTPATIENT)
Dept: ENDOCRINOLOGY | Age: 64
End: 2019-08-04

## 2019-08-04 DIAGNOSIS — E78.2 MIXED HYPERLIPIDEMIA: Primary | ICD-10-CM

## 2019-08-06 NOTE — TELEPHONE ENCOUNTER
I spoke with patient. He is exercising, walking, watching his diet. Unclear why triglycerides are elevated. We will recheck triglycerides. Unlikely is related to sarcoid.

## 2019-08-09 DIAGNOSIS — E78.2 MIXED HYPERLIPIDEMIA: ICD-10-CM

## 2019-08-09 LAB
CHOLESTEROL, TOTAL: 291 MG/DL (ref 0–199)
HDLC SERPL-MCNC: 22 MG/DL (ref 40–60)
LDL CHOLESTEROL CALCULATED: ABNORMAL MG/DL
LDL CHOLESTEROL DIRECT: 135 MG/DL
TRIGL SERPL-MCNC: 992 MG/DL (ref 0–150)
VLDLC SERPL CALC-MCNC: ABNORMAL MG/DL

## 2019-08-16 ENCOUNTER — OFFICE VISIT (OUTPATIENT)
Dept: ENDOCRINOLOGY | Age: 64
End: 2019-08-16
Payer: MEDICARE

## 2019-08-16 VITALS
HEIGHT: 70 IN | OXYGEN SATURATION: 94 % | WEIGHT: 226.8 LBS | BODY MASS INDEX: 32.47 KG/M2 | SYSTOLIC BLOOD PRESSURE: 112 MMHG | HEART RATE: 66 BPM | DIASTOLIC BLOOD PRESSURE: 73 MMHG

## 2019-08-16 DIAGNOSIS — E66.9 CLASS 1 OBESITY WITH SERIOUS COMORBIDITY AND BODY MASS INDEX (BMI) OF 32.0 TO 32.9 IN ADULT, UNSPECIFIED OBESITY TYPE: ICD-10-CM

## 2019-08-16 DIAGNOSIS — E04.9 GOITER: ICD-10-CM

## 2019-08-16 DIAGNOSIS — E78.2 MIXED HYPERLIPIDEMIA: ICD-10-CM

## 2019-08-16 DIAGNOSIS — E55.9 VITAMIN D DEFICIENCY: ICD-10-CM

## 2019-08-16 DIAGNOSIS — E23.0 HYPOGONADOTROPIC HYPOGONADISM (HCC): ICD-10-CM

## 2019-08-16 PROCEDURE — 99214 OFFICE O/P EST MOD 30 MIN: CPT | Performed by: INTERNAL MEDICINE

## 2019-08-16 RX ORDER — ROSUVASTATIN CALCIUM 20 MG/1
20 TABLET, COATED ORAL DAILY
Qty: 90 TABLET | Refills: 1 | Status: SHIPPED | OUTPATIENT
Start: 2019-08-16 | End: 2020-02-04

## 2019-08-16 NOTE — PROGRESS NOTES
Татьяна Gooden is a 60 y.o. male who presents for Type 2 diabetes mellitus. Current symptoms/problems include hyperglycemia and show worsening. 1.  Type 2 diabetes mellitus, uncontrolled, with neuropathy ([facility] [facility] [E11.40, E11.65]    Diagnosed with Type 2 diabetes mellitus in 2008.     Comorbid conditions: hyperlipidemia and Neuropathy    Current diabetic medications include: metformin, Januvia, Jardiance    Intolerance to diabetes medications: No     Weight trend: stable  Prior visit with dietician: yes  Current diet: on average, 3 meals per day  Current exercise: frequent     Current monitoring regimen: home blood tests - 1 times daily before meter broke  Has brought blood glucose log/meter:  No  Home blood sugar records: fasting range: 100-130  and postprandial range: 100-150  Any episodes of hypoglycemia? Ys  Hypoglycemia frequency and time(s):  Very rare  Does patient have Glucagon emergency kit? No  Does patient have rapid acting carbohydrate? No  Does patient wear a medic alert bracelet or necklace? No    2. Mixed hyperlipidemia  No muscle pain. Has cramps. 3. Hypogonadotropic hypogonadism (HCC)  Has weakness. 4. Vitamin D deficiency  No bone pain. 5. Obesity  Eats healthy, exercises. 6. Goiter  Has difficulty swallowing.  has hoarseness. Aunt had thyroid cancer  No radiation to his neck.     Lab Results   Component Value Date    LABA1C 7.6 07/12/2019     Lab Results   Component Value Date    .4 07/12/2019         Past Medical History:   Diagnosis Date    Bilateral swelling of feet     Generalized headaches     Goiter     Hemorrhoids     Hiatal hernia     Hyperlipidemia     Impaired fasting glucose     Neuropathy     Other and unspecified anterior pituitary hyperfunction     Other anterior pituitary disorders     Other testicular hypofunction     Other testicular hypofunction     Parkinson's disease     Reflux     Sarcoidosis     Type II or unspecified type diabetes mellitus with neurological manifestations, uncontrolled(250.62)     Type II or unspecified type diabetes mellitus without mention of complication, uncontrolled       Patient Active Problem List   Diagnosis    Mixed hyperlipidemia    Diabetic polyneuropathy ([address]    Hypogonadotropic hypogonadism ([address]    Type 2 diabetes mellitus, uncontrolled, with neuropathy ([address]    Parkinson's disease ([address]    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      EXTERNAL EAR SURGERY      EYE SURGERY      FOOT SURGERY      SKIN BIOPSY      SKIN BIOPSY      TONSILLECTOMY      UMBILICAL HERNIA REPAIR  3/89/54    umbillical hernia repair with mesh     Social History     Socioeconomic History    Marital status:      Spouse name: Not on file    Number of children: 2    Years of education: Not on file    Highest education level: Not on file   Occupational History    Occupation: retired    Social Needs    Financial resource strain: Not on file    Food insecurity:     Worry: Not on file     Inability: Not on file   [place] needs:     Medical: Not on file     Non-medical: Not on file   Tobacco Use    Smoking status: Never Smoker    Smokeless tobacco: Never Used   Substance and Sexual Activity    Alcohol use: No    Drug use: No    Sexual activity: Yes     Partners: Female   Lifestyle    Physical activity:     Days per week: Not on file     Minutes per session: Not on file    Stress: Not on file   Relationships    Social connections:     Talks on phone: Not on file     Gets together: Not on file     Attends Faith service: Not on file     Active member of club or by Does not apply route 4 times daily 400 each 5    Blood Glucose Monitoring Suppl (FREESTYLE LITE) LIDIA 1 Device by Does not apply route 4 times daily 1 Device 0    Cholecalciferol (VITAMIN D3) 5000 units TABS Take 5,000 Units by mouth daily      propranolol (INDERAL) 80 MG tablet Take 80 mg by mouth daily       Probiotic Product (PROBIOTIC-10 PO) Take 10 mg by mouth daily      Cinnamon Bark POWD 500 mg by Does not apply route daily      carbidopa-levodopa (SINEMET)  MG per tablet Take 2 tablets by mouth 4 times daily 1.5 tablets       aspirin 81 MG EC tablet Take 81 mg by mouth daily. Last dose 3/9/15       No current facility-administered medications for this visit.       No Known Allergies  Family Status   Relation Name Status    Mother      Father     Kianna Jaime  (Not Specified)    Sister  (Not Specified)    Brother  (Not Specified)    MAunt  (Not Specified)    MUnc  (Not Specified)    MGM  (Not Specified)    MGF  (Not Specified)    Neg Hx  (Not Specified)       Review of Systems  Constitutional: has fatigue, no fever, no recent weight gain, has recent weight loss, no changes in appetite  Eyes: no eye pain, no change in vision, no eye redness, no eye irritation, no double vision  Ears, nose, throat: no nasal congestion, no sore throat, no earache, no decrease in hearing, no hoarseness, no dry mouth, no sinus problems, has difficulty swallowing, no neck lumps, no dental problems, no mouth sores, no ringing in ears  Pulmonary: no shortness of breath, no wheezing, no dyspnea on exertion, no cough  Cardiovascular: no chest pain, no lower extremity edema, no orthopnea, no intermittent leg claudication, no palpitations  Gastrointestinal: no abdominal pain, no nausea, no vomiting, no diarrhea, no constipation, no dysphagia, no heartburn, no bloating  Genitourinary: no dysuria, no urinary incontinence, no urinary hesitancy, no urinary frequency, no feelings of urinary urgency, no MG/ML injection; Inject 1 mL into the muscle every 14 days. - CBC; Future  - Testosterone, free, total; Future    4. Vitamin D deficiency  25OH vit D 40. Supplements. - Vitamin D 25 Hydroxy; Future    5. Obesity  Diet, exercise. 6.  Nephropathy  Follow micr/creat ratio. Lisinopril 10 mg qd    7. Goiter  Thyroid sono  New problem    Reviewed and/or ordered clinical lab results Yes  Reviewed and/or ordered radiology tests No  Reviewed and/or ordered other diagnostic tests No  Discussed test results with performing physician No  Independently reviewed image, tracing, or specimen No  Made a decision to obtain old records Yes  Reviewed and summarized old records Yes  Dx in 2008. Obtained history from other than patient No    Kelly Subha was counseled regarding symptoms of diabetes diagnosis, course and complications of disease if inadequately treated, side effects of medications, diagnosis, treatment options, and prognosis, risks, benefits, complications, and alternatives of treatment, labs, imaging and other studies and treatment targets and goals. He understands instructions and counseling. Return in about 3 months (around 11/16/2019) for diabetes.

## 2019-08-21 ENCOUNTER — TELEPHONE (OUTPATIENT)
Dept: ENDOCRINOLOGY | Age: 64
End: 2019-08-21

## 2019-09-23 DIAGNOSIS — E11.40 TYPE 2 DIABETES, CONTROLLED, WITH NEUROPATHY (HCC): ICD-10-CM

## 2019-09-23 RX ORDER — SITAGLIPTIN 100 MG/1
TABLET, FILM COATED ORAL
Qty: 30 TABLET | Refills: 2 | Status: SHIPPED | OUTPATIENT
Start: 2019-09-23 | End: 2019-12-12 | Stop reason: SDUPTHER

## 2019-10-03 RX ORDER — LISINOPRIL 10 MG/1
TABLET ORAL
Qty: 90 TABLET | Refills: 0 | Status: SHIPPED | OUTPATIENT
Start: 2019-10-03 | End: 2020-01-29 | Stop reason: SDUPTHER

## 2019-10-03 RX ORDER — EMPAGLIFLOZIN 25 MG/1
TABLET, FILM COATED ORAL
Qty: 90 TABLET | Refills: 0 | Status: SHIPPED | OUTPATIENT
Start: 2019-10-03 | End: 2020-01-29 | Stop reason: SDUPTHER

## 2019-10-21 DIAGNOSIS — E78.2 MIXED HYPERLIPIDEMIA: ICD-10-CM

## 2019-10-21 RX ORDER — ICOSAPENT ETHYL 1000 MG/1
CAPSULE ORAL
Qty: 360 CAPSULE | Refills: 0 | Status: SHIPPED | OUTPATIENT
Start: 2019-10-21 | End: 2019-11-18 | Stop reason: SDUPTHER

## 2019-10-23 DIAGNOSIS — E78.2 MIXED HYPERLIPIDEMIA: ICD-10-CM

## 2019-10-23 RX ORDER — ICOSAPENT ETHYL 1000 MG/1
CAPSULE ORAL
Qty: 360 CAPSULE | Refills: 0 | Status: SHIPPED | OUTPATIENT
Start: 2019-10-23 | End: 2020-02-04

## 2019-11-11 RX ORDER — LANCETS 30 GAUGE
EACH MISCELLANEOUS
Qty: 100 EACH | Refills: 0 | Status: SHIPPED | OUTPATIENT
Start: 2019-11-11 | End: 2020-05-04 | Stop reason: SDUPTHER

## 2019-11-12 RX ORDER — LANCETS 30 GAUGE
EACH MISCELLANEOUS
Qty: 100 EACH | Refills: 0 | Status: SHIPPED | OUTPATIENT
Start: 2019-11-12

## 2019-11-13 RX ORDER — LANCETS 30 GAUGE
EACH MISCELLANEOUS
Qty: 200 EACH | Refills: 4 | OUTPATIENT
Start: 2019-11-13

## 2019-11-17 PROBLEM — E11.21 CONTROLLED TYPE 2 DIABETES MELLITUS WITH DIABETIC NEPHROPATHY, WITH LONG-TERM CURRENT USE OF INSULIN (HCC): Status: ACTIVE | Noted: 2019-04-15

## 2019-11-17 PROBLEM — Z79.4 CONTROLLED TYPE 2 DIABETES MELLITUS WITH DIABETIC NEPHROPATHY, WITH LONG-TERM CURRENT USE OF INSULIN (HCC): Status: ACTIVE | Noted: 2019-04-15

## 2019-11-18 ENCOUNTER — OFFICE VISIT (OUTPATIENT)
Dept: ENDOCRINOLOGY | Age: 64
End: 2019-11-18
Payer: MEDICARE

## 2019-11-18 VITALS
BODY MASS INDEX: 31.41 KG/M2 | OXYGEN SATURATION: 95 % | DIASTOLIC BLOOD PRESSURE: 78 MMHG | HEIGHT: 70 IN | WEIGHT: 219.4 LBS | HEART RATE: 86 BPM | SYSTOLIC BLOOD PRESSURE: 130 MMHG

## 2019-11-18 DIAGNOSIS — E55.9 VITAMIN D DEFICIENCY: ICD-10-CM

## 2019-11-18 DIAGNOSIS — E11.40 TYPE 2 DIABETES, CONTROLLED, WITH NEUROPATHY (HCC): Primary | ICD-10-CM

## 2019-11-18 DIAGNOSIS — E04.9 GOITER: ICD-10-CM

## 2019-11-18 DIAGNOSIS — E11.21 CONTROLLED TYPE 2 DIABETES MELLITUS WITH DIABETIC NEPHROPATHY, WITH LONG-TERM CURRENT USE OF INSULIN (HCC): ICD-10-CM

## 2019-11-18 DIAGNOSIS — E78.2 MIXED HYPERLIPIDEMIA: ICD-10-CM

## 2019-11-18 DIAGNOSIS — E23.0 HYPOGONADOTROPIC HYPOGONADISM (HCC): ICD-10-CM

## 2019-11-18 DIAGNOSIS — Z79.4 CONTROLLED TYPE 2 DIABETES MELLITUS WITH DIABETIC NEPHROPATHY, WITH LONG-TERM CURRENT USE OF INSULIN (HCC): ICD-10-CM

## 2019-11-18 DIAGNOSIS — E66.9 CLASS 1 OBESITY WITH SERIOUS COMORBIDITY AND BODY MASS INDEX (BMI) OF 32.0 TO 32.9 IN ADULT, UNSPECIFIED OBESITY TYPE: ICD-10-CM

## 2019-11-18 PROCEDURE — 99214 OFFICE O/P EST MOD 30 MIN: CPT | Performed by: INTERNAL MEDICINE

## 2019-11-19 DIAGNOSIS — E11.40 TYPE 2 DIABETES, CONTROLLED, WITH NEUROPATHY (HCC): ICD-10-CM

## 2019-12-12 DIAGNOSIS — E11.40 TYPE 2 DIABETES, CONTROLLED, WITH NEUROPATHY (HCC): ICD-10-CM

## 2019-12-12 RX ORDER — SITAGLIPTIN 100 MG/1
TABLET, FILM COATED ORAL
Qty: 30 TABLET | Refills: 3 | Status: SHIPPED | OUTPATIENT
Start: 2019-12-12 | End: 2020-05-04 | Stop reason: SDUPTHER

## 2019-12-27 DIAGNOSIS — E23.0 HYPOGONADOTROPIC HYPOGONADISM (HCC): ICD-10-CM

## 2019-12-29 RX ORDER — TESTOSTERONE CYPIONATE 200 MG/ML
INJECTION INTRAMUSCULAR
Qty: 6 ML | Refills: 1 | Status: SHIPPED | OUTPATIENT
Start: 2019-12-29 | End: 2020-05-04 | Stop reason: SDUPTHER

## 2020-01-29 RX ORDER — LISINOPRIL 10 MG/1
TABLET ORAL
Qty: 90 TABLET | Refills: 0 | Status: SHIPPED | OUTPATIENT
Start: 2020-01-29 | End: 2020-02-04

## 2020-02-04 RX ORDER — LISINOPRIL 10 MG/1
TABLET ORAL
Qty: 90 TABLET | Refills: 0 | Status: SHIPPED | OUTPATIENT
Start: 2020-02-04 | End: 2020-05-04 | Stop reason: SDUPTHER

## 2020-02-04 RX ORDER — ROSUVASTATIN CALCIUM 20 MG/1
TABLET, COATED ORAL
Qty: 90 TABLET | Refills: 1 | Status: SHIPPED | OUTPATIENT
Start: 2020-02-04 | End: 2020-05-04 | Stop reason: SDUPTHER

## 2020-02-04 RX ORDER — ICOSAPENT ETHYL 1000 MG/1
CAPSULE ORAL
Qty: 360 CAPSULE | Refills: 0 | Status: SHIPPED | OUTPATIENT
Start: 2020-02-04 | End: 2020-05-04 | Stop reason: SDUPTHER

## 2020-04-20 ENCOUNTER — TELEPHONE (OUTPATIENT)
Dept: ENDOCRINOLOGY | Age: 65
End: 2020-04-20

## 2020-05-03 NOTE — PROGRESS NOTES
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      EXTERNAL EAR SURGERY      EYE SURGERY      FOOT SURGERY      SKIN BIOPSY      SKIN BIOPSY      TONSILLECTOMY      UMBILICAL HERNIA REPAIR  8/44/66    umbillical hernia repair with mesh     Social History     Socioeconomic History    Marital status:      Spouse name: Not on file    Number of children: 2    Years of education: Not on file    Highest education level: Not on file   Occupational History    Occupation: retired    Social Needs    Financial resource strain: Not on file    Food insecurity     Worry: Not on file     Inability: Not on file   [place] needs     Medical: Not on file     Non-medical: Not on file   Tobacco Use    Smoking status: Never Smoker    Smokeless tobacco: Never Used   Substance and Sexual Activity    Alcohol use: No    Drug use: No    Sexual activity: Yes     Partners: Female   Lifestyle    Physical activity     Days per week: Not on file     Minutes per session: Not on file    Stress: Not on file   Relationships    Social connections     Talks on phone: Not on file     Gets together: Not on file orthopnea, no intermittent leg claudication, no palpitations  Gastrointestinal: no abdominal pain, no nausea, no vomiting, no diarrhea, no constipation, no dysphagia, no heartburn, no bloating  Genitourinary: no dysuria, no urinary incontinence, no urinary hesitancy, no urinary frequency, no feelings of urinary urgency, no nocturia  Musculoskeletal: no joint swelling, no joint stiffness, has joint pain, has muscle cramps, has muscle pain, no bone pain  Integument/Breast: no skin rashes, no skin lesions, no itching, no dry skin, no breast pain, no breast mass, no skin hives, no skin discoloration, no nipple discharge  Neurological: no numbness, no tingling, no weakness, no confusion, has headaches, has dizziness, no fainting, has tremors, has decrease in memory, has balance problems  Psychiatric: has anxiety, no depression, has insomnia  Hematologic/Lymphatic: no tendency for easy bleeding, no swollen lymph nodes, no tendency for easy bruising  Immunology: no seasonal allergies, no frequent infections, no frequent illnesses  Endocrine: no temperature intolerance     OBJECTIVE:    Patient reported weight:  Wt 218 lbs   5'10 1/2    Wt Readings from Last 3 Encounters:   11/18/19 219 lb 6.4 oz (99.5 kg)   08/16/19 226 lb 12.8 oz (102.9 kg)   04/16/19 239 lb 9.6 oz (108.7 kg)         OBJECTIVE:  Constitutional: no apparent distress, well developed and well nourished  Mental status: alert and awake, oriented to person, place and time, able to follow commands  Psychiatric: judgement and insight and normal, recent and remote memory are intact, mood and affect are normal  Skin: skin inspection appears normal, no significant exanthematous lesions or discoloration noted on facial skin  Head and Face: head and face inspection revealed no abnormalities, normocephalic, atraumatic  Eyes: no lid or conjunctival swelling, erythema or discharge, sclera appears normal  Ears/Nose: external inspection of ears and nose revealed no

## 2020-05-04 ENCOUNTER — VIRTUAL VISIT (OUTPATIENT)
Dept: ENDOCRINOLOGY | Age: 65
End: 2020-05-04
Payer: MEDICARE

## 2020-05-04 PROCEDURE — 99214 OFFICE O/P EST MOD 30 MIN: CPT | Performed by: INTERNAL MEDICINE

## 2020-05-04 RX ORDER — ROSUVASTATIN CALCIUM 20 MG/1
TABLET, COATED ORAL
Qty: 90 TABLET | Refills: 1 | Status: SHIPPED | OUTPATIENT
Start: 2020-05-04 | End: 2020-08-27 | Stop reason: SDUPTHER

## 2020-05-04 RX ORDER — EMPAGLIFLOZIN 25 MG/1
TABLET, FILM COATED ORAL
Qty: 90 TABLET | Refills: 1 | Status: SHIPPED | OUTPATIENT
Start: 2020-05-04 | End: 2020-08-27 | Stop reason: SDUPTHER

## 2020-05-04 RX ORDER — LISINOPRIL 10 MG/1
TABLET ORAL
Qty: 90 TABLET | Refills: 1 | Status: SHIPPED | OUTPATIENT
Start: 2020-05-04 | End: 2020-08-27 | Stop reason: SDUPTHER

## 2020-05-04 RX ORDER — FENOFIBRATE 160 MG/1
TABLET ORAL
Qty: 90 TABLET | Refills: 1 | Status: SHIPPED | OUTPATIENT
Start: 2020-05-04 | End: 2020-08-27 | Stop reason: SDUPTHER

## 2020-05-04 RX ORDER — ICOSAPENT ETHYL 1000 MG/1
CAPSULE ORAL
Qty: 360 CAPSULE | Refills: 1 | Status: SHIPPED | OUTPATIENT
Start: 2020-05-04 | End: 2020-08-27 | Stop reason: SDUPTHER

## 2020-05-04 RX ORDER — LANCETS 30 GAUGE
EACH MISCELLANEOUS
Qty: 400 EACH | Refills: 3 | Status: SHIPPED | OUTPATIENT
Start: 2020-05-04 | End: 2021-05-11

## 2020-05-04 RX ORDER — TESTOSTERONE CYPIONATE 200 MG/ML
INJECTION INTRAMUSCULAR
Qty: 6 ML | Refills: 1 | Status: SHIPPED | OUTPATIENT
Start: 2020-05-04 | End: 2020-08-27 | Stop reason: SDUPTHER

## 2020-08-06 DIAGNOSIS — Z79.4 CONTROLLED TYPE 2 DIABETES MELLITUS WITH DIABETIC NEPHROPATHY, WITH LONG-TERM CURRENT USE OF INSULIN (HCC): ICD-10-CM

## 2020-08-06 DIAGNOSIS — E23.0 HYPOGONADOTROPIC HYPOGONADISM (HCC): ICD-10-CM

## 2020-08-06 DIAGNOSIS — E11.40 TYPE 2 DIABETES, CONTROLLED, WITH NEUROPATHY (HCC): ICD-10-CM

## 2020-08-06 DIAGNOSIS — E78.2 MIXED HYPERLIPIDEMIA: ICD-10-CM

## 2020-08-06 DIAGNOSIS — E11.21 CONTROLLED TYPE 2 DIABETES MELLITUS WITH DIABETIC NEPHROPATHY, WITH LONG-TERM CURRENT USE OF INSULIN (HCC): ICD-10-CM

## 2020-08-06 DIAGNOSIS — E55.9 VITAMIN D DEFICIENCY: ICD-10-CM

## 2020-08-06 LAB
A/G RATIO: 1.9 (ref 1.1–2.2)
ALBUMIN SERPL-MCNC: 4.7 G/DL (ref 3.4–5)
ALP BLD-CCNC: 51 U/L (ref 40–129)
ALT SERPL-CCNC: 20 U/L (ref 10–40)
ANION GAP SERPL CALCULATED.3IONS-SCNC: 13 MMOL/L (ref 3–16)
AST SERPL-CCNC: 21 U/L (ref 15–37)
BILIRUB SERPL-MCNC: 0.3 MG/DL (ref 0–1)
BUN BLDV-MCNC: 22 MG/DL (ref 7–20)
CALCIUM SERPL-MCNC: 9.6 MG/DL (ref 8.3–10.6)
CHLORIDE BLD-SCNC: 101 MMOL/L (ref 99–110)
CHOLESTEROL, TOTAL: 131 MG/DL (ref 0–199)
CO2: 26 MMOL/L (ref 21–32)
CREAT SERPL-MCNC: 0.9 MG/DL (ref 0.8–1.3)
CREATININE URINE: 104 MG/DL (ref 39–259)
GFR AFRICAN AMERICAN: >60
GFR NON-AFRICAN AMERICAN: >60
GLOBULIN: 2.5 G/DL
GLUCOSE BLD-MCNC: 115 MG/DL (ref 70–99)
HDLC SERPL-MCNC: 29 MG/DL (ref 40–60)
LDL CHOLESTEROL CALCULATED: 69 MG/DL
MICROALBUMIN UR-MCNC: <1.2 MG/DL
MICROALBUMIN/CREAT UR-RTO: NORMAL MG/G (ref 0–30)
POTASSIUM SERPL-SCNC: 5.2 MMOL/L (ref 3.5–5.1)
SODIUM BLD-SCNC: 140 MMOL/L (ref 136–145)
T4 FREE: 1.5 NG/DL (ref 0.9–1.8)
TOTAL PROTEIN: 7.2 G/DL (ref 6.4–8.2)
TRIGL SERPL-MCNC: 165 MG/DL (ref 0–150)
TSH SERPL DL<=0.05 MIU/L-ACNC: 1.32 UIU/ML (ref 0.27–4.2)
VITAMIN D 25-HYDROXY: 72.1 NG/ML
VLDLC SERPL CALC-MCNC: 33 MG/DL

## 2020-08-07 LAB
ESTIMATED AVERAGE GLUCOSE: 131.2 MG/DL
HBA1C MFR BLD: 6.2 %

## 2020-08-08 ENCOUNTER — TELEPHONE (OUTPATIENT)
Dept: ENDOCRINOLOGY | Age: 65
End: 2020-08-08

## 2020-08-08 LAB
SEX HORMONE BINDING GLOBULIN: 29 NMOL/L (ref 11–80)
TESTOSTERONE FREE PERCENT: 1.7 % (ref 1.6–2.9)
TESTOSTERONE FREE, CALC: 28 PG/ML (ref 47–244)
TESTOSTERONE TOTAL-MALE: 165 NG/DL (ref 300–720)
TESTOSTERONE, BIOAVAILABLE: 90 NG/DL (ref 131–682)

## 2020-08-09 NOTE — TELEPHONE ENCOUNTER
I received lab work, but I do not see appointment scheduled. Please schedule appointment with patient to discuss labs.

## 2020-08-27 ENCOUNTER — VIRTUAL VISIT (OUTPATIENT)
Dept: ENDOCRINOLOGY | Age: 65
End: 2020-08-27
Payer: MEDICARE

## 2020-08-27 PROCEDURE — 99214 OFFICE O/P EST MOD 30 MIN: CPT | Performed by: INTERNAL MEDICINE

## 2020-08-27 RX ORDER — HYDROXYCHLOROQUINE SULFATE 200 MG/1
TABLET, FILM COATED ORAL
COMMUNITY
Start: 2020-08-24

## 2020-08-27 RX ORDER — FENOFIBRATE 160 MG/1
TABLET ORAL
Qty: 90 TABLET | Refills: 1 | Status: SHIPPED | OUTPATIENT
Start: 2020-08-27 | End: 2021-05-12

## 2020-08-27 RX ORDER — TESTOSTERONE CYPIONATE 200 MG/ML
INJECTION INTRAMUSCULAR
Qty: 10 ML | Refills: 1 | Status: SHIPPED | OUTPATIENT
Start: 2020-08-27 | End: 2022-06-16 | Stop reason: SDUPTHER

## 2020-08-27 RX ORDER — LISINOPRIL 10 MG/1
TABLET ORAL
Qty: 90 TABLET | Refills: 1 | Status: SHIPPED | OUTPATIENT
Start: 2020-08-27 | End: 2021-06-01

## 2020-08-27 RX ORDER — ROSUVASTATIN CALCIUM 20 MG/1
TABLET, COATED ORAL
Qty: 90 TABLET | Refills: 1 | Status: SHIPPED | OUTPATIENT
Start: 2020-08-27 | End: 2021-06-10

## 2020-08-27 RX ORDER — EMPAGLIFLOZIN 25 MG/1
TABLET, FILM COATED ORAL
Qty: 90 TABLET | Refills: 1 | Status: SHIPPED | OUTPATIENT
Start: 2020-08-27 | End: 2021-05-18

## 2020-08-27 RX ORDER — ICOSAPENT ETHYL 1000 MG/1
CAPSULE ORAL
Qty: 360 CAPSULE | Refills: 1 | Status: SHIPPED | OUTPATIENT
Start: 2020-08-27 | End: 2021-01-14

## 2020-08-27 NOTE — PROGRESS NOTES
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      EXTERNAL EAR SURGERY      EYE SURGERY      FOOT SURGERY      SKIN BIOPSY      SKIN BIOPSY      TONSILLECTOMY      UMBILICAL HERNIA REPAIR  2/84/25    umbillical hernia repair with mesh     Social History     Socioeconomic History    Marital status:      Spouse name: Not on file    Number of children: 2    Years of education: Not on file    Highest education level: Not on file   Occupational History    Occupation: retired    Social Needs    Financial resource strain: Not on file    Food insecurity     Worry: Not on file     Inability: Not on file   [facility] needs     Medical: Not on file     Non-medical: Not on file   Tobacco Use    Smoking status: Never Smoker    Smokeless tobacco: Never Used   Substance and Sexual Activity    Alcohol use: No    Drug use: No    Sexual activity: Yes     Partners: Female   Lifestyle    Physical activity     Days per week: Not on file     Minutes per session: Not on file    Stress: Not on file   Relationships    Social connections     Talks on phone: Not on file     Gets together: Not on file Attends [employer] service: Not on file     Active member of club or organization: Not on file     Attends meetings of clubs or organizations: Not on file     Relationship status: Not on file    Intimate partner violence     Fear of current or ex partner: Not on file     Emotionally abused: Not on file     Physically abused: Not on file     Forced sexual activity: Not on file   Other Topics Concern    Not on file   Social History Narrative    Not on file     Family History   Problem Relation Age of Onset    Diabetes Mother     Thyroid Disease Paternal Aunt     Cancer Paternal Aunt     Diabetes Sister     Thyroid Disease Sister     Stroke Brother     Diabetes Maternal Aunt     Diabetes Maternal Uncle     Diabetes Maternal Grandmother     Diabetes Maternal Grandfather     High Blood Pressure Neg Hx     Heart Disease Neg Hx      Current Outpatient Medications   Medication Sig Dispense Refill    hydroxychloroquine (PLAQUENIL) 200 MG tablet TAKE 2 TABLETS BY MOUTH ONE TIME A DAY with breakfast      Cholecalciferol (VITAMIN D3) 125 MCG (5000 UT) TABS Take 1 tab 5 times a week, 2 tabs 2 times a week 150 tablet 3    empagliflozin (JARDIANCE) 25 MG tablet TAKE 1 TABLET BY MOUTH ONE TIME A DAY 90 tablet 1    fenofibrate (TRIGLIDE) 160 MG tablet TAKE 1 TABLET BY MOUTH ONE TIME A DAY 90 tablet 1    lisinopril (PRINIVIL;ZESTRIL) 10 MG tablet TAKE 1 TABLET BY MOUTH ONE TIME A DAY 90 tablet 1    metFORMIN (GLUCOPHAGE) 1000 MG tablet TAKE 1 TABLET BY MOUTH TWO TIMES A DAY WITH MEALS 180 tablet 1    rosuvastatin (CRESTOR) 20 MG tablet TAKE 1 TABLET BY MOUTH ONE TIME A DAY 90 tablet 1    SITagliptin (JANUVIA) 100 MG tablet TAKE 1 TABLET BY MOUTH ONE TIME A DAY 90 tablet 1    testosterone cypionate (DEPOTESTOTERONE CYPIONATE) 200 MG/ML injection INJECT 1 ML INTO THE MUSCLE EVERY 14 DAYS 10 mL 1    VASCEPA 1 g CAPS capsule TAKE 2 CAPSULES BY MOUTH TWO TIMES A  capsule 1    Lancets (ONETOUCH DELICA PLUS [id number]) MISC use to test blood sugar 4 times daily 400 each 3    ONE TOUCH ULTRA TEST strip 4 times daily. 400 strip 3    Syringe, Disposable, 3 ML MISC Once every 2 weeks 100 each 3    Lancets (ONETOUCH DELICA PLUS [id number]) MISC use to test blood sugar 4 times daily 100 each 0    BREO ELLIPTA 200-25 MCG/INH AEPB INHALE 1 PUFF BY MOUTH ONE TIME A DAY  11    Blood Glucose Monitoring Suppl (ONE TOUCH ULTRA 2) w/Device KIT To check blood glucose 4 times daily. 1 kit 0    FREESTYLE LANCETS MISC 1 Units by Does not apply route 4 times daily 400 each 5    propranolol (INDERAL) 80 MG tablet Take 80 mg by mouth daily       Probiotic Product (PROBIOTIC-10 PO) Take 10 mg by mouth daily      Cinnamon Bark POWD 500 mg by Does not apply route daily      carbidopa-levodopa (SINEMET)  MG per tablet Take 2 tablets by mouth 4 times daily 1.5 tablets       aspirin 81 MG EC tablet Take 81 mg by mouth daily. Last dose 3/9/15      Blood Glucose Monitoring Suppl (FREESTYLE LITE) LIDIA 1 Device by Does not apply route 4 times daily (Patient not taking: Reported on 2020) 1 Device 0     No current facility-administered medications for this visit.       No Known Allergies  Family Status   Relation Name Status    Mother      Father     Pennie Jaime  (Not Specified)    Sister  (Not Specified)    Brother  (Not Specified)    MAunt  (Not Specified)    MUnc  (Not Specified)    MGM  (Not Specified)    MGF  (Not Specified)    Neg Hx  (Not Specified)       Review of Systems  Constitutional: has fatigue, no fever, no recent weight gain, has recent weight loss, no changes in appetite  Eyes: no eye pain, no change in vision, no eye redness, no eye irritation, no double vision  Ears, nose, throat: no nasal congestion, no sore throat, no earache, no decrease in hearing, no hoarseness, no dry mouth, no sinus problems, has difficulty swallowing, no neck lumps, no dental problems, no mouth sores, no ringing in ears  Pulmonary: no shortness of breath, no wheezing, no dyspnea on exertion, no cough  Cardiovascular: no chest pain, no lower extremity edema, no orthopnea, no intermittent leg claudication, no palpitations  Gastrointestinal: no abdominal pain, no nausea, no vomiting, no diarrhea, no constipation, no dysphagia, no heartburn, no bloating  Genitourinary: no dysuria, no urinary incontinence, no urinary hesitancy, no urinary frequency, no feelings of urinary urgency, no nocturia  Musculoskeletal: no joint swelling, no joint stiffness, has joint pain, has muscle cramps, has muscle pain, no bone pain  Integument/Breast: no skin rashes, no skin lesions, no itching, no dry skin, no breast pain, no breast mass, no skin hives, no skin discoloration, no nipple discharge  Neurological: no numbness, no tingling, no weakness, no confusion, has headaches, has dizziness, no fainting, has tremors, has decrease in memory, has balance problems  Psychiatric: has anxiety, no depression, has insomnia  Hematologic/Lymphatic: no tendency for easy bleeding, no swollen lymph nodes, no tendency for easy bruising  Immunology: no seasonal allergies, no frequent infections, no frequent illnesses  Endocrine: no temperature intolerance     OBJECTIVE:    Patient reported weight:  Wt 218 lbs 225 lbs  Ht 5'10 1/2    Wt Readings from Last 3 Encounters:   11/18/19 219 lb 6.4 oz (99.5 kg)   08/16/19 226 lb 12.8 oz (102.9 kg)   04/16/19 239 lb 9.6 oz (108.7 kg)         OBJECTIVE:  Constitutional: no apparent distress, well developed and well nourished  Mental status: alert and awake, oriented to person, place and time, able to follow commands  Psychiatric: judgement and insight and normal, recent and remote memory are intact, mood and affect are normal  Skin: skin inspection appears normal, no significant exanthematous lesions or discoloration noted on facial skin  Head and Face: head and face inspection revealed no abnormalities, normocephalic, atraumatic  Eyes: no lid or conjunctival swelling, erythema or discharge, sclera appears normal  Ears/Nose: external inspection of ears and nose revealed no abnormalities, hearing is grossly normal  Oropharynx/Mouth/Face: lips are normal with no lesions, the voice quality was normal  Neck: neck is symmetric, no visualized mass  Pulmonary/chest: respiratory effort normal, no generalized signs of difficulty breathing or signs of respiratory distress  Musculoskeletal: normal station, normal range of motion of neck  Neurological: no facial asymmetry, normal general cortical function    ASSESSMENT/PLAN:  1. Type 2 diabetes, controlled, with neuropathy (HCC)  HbA1C 13.1-7.6-6.3-6.2  - Jardiance 25 mg daily  - Januvia 100 mg  - metFORMIN (GLUCOPHAGE) 1000 MG tablet; Take 1 tablet by mouth 2 times daily (with meals)   - Hemoglobin A1C; Future  - Comprehensive Metabolic Panel; Future  - Microalbumin / Creatinine Urine Ratio; Future    2. Mixed hyperlipidemia  [phone]  [phone]  HDL 25-22-35-29  Rosuvastatin 20 mg  Improved. Diet. Patient has muscle weakness, pain and cramping. Has Parkinson's.  - fenofibrate 160 MG tablet; Take 1 tablet by mouth daily    - Vascepa 2 caps bid  - Lipid Panel; Future    3. Hypogonadotropic hypogonadism (HCC)  Uncontrolled. Forgets at times. Testosterone [phone]  - testosterone cypionate (DEPOTESTOTERONE CYPIONATE) 200 MG/ML injection; Inject 1 mL into the muscle every 14 days. - CBC; Future  - Testosterone, free, total; Future    4. Vitamin D deficiency  Uncontrolled. Vitamin   25OH vit D 40-31-72.1  Supplements. - Vitamin D 25 Hydroxy; Future    5. Obesity  Diet, exercise. 6.  Nephropathy  Follow micr/creat ratio. Lisinopril 10 mg qd    7. Goiter  Thyroid sono did not show nodules. Unlikely difficulty swallowing and hoarseness are related to thyroid.   Right lobe normal, left lobe upper limit of normal.  TSH 0.8-1.32    Reviewed and/or ordered clinical lab results Yes  Reviewed and/or ordered radiology tests No  Reviewed and/or ordered other diagnostic tests No  Discussed test results with performing physician No  Independently reviewed image, tracing, or specimen No  Made a decision to obtain old records Yes  Reviewed and summarized old records Yes  Dx in 2008. Obtained history from other than patient No    Sina Low was counseled regarding symptoms of diabetes diagnosis, course and complications of disease if inadequately treated, side effects of medications, diagnosis, treatment options, and prognosis, risks, benefits, complications, and alternatives of treatment, labs, imaging and other studies and treatment targets and goals. He understands instructions and counseling. Sina Low is a 63 y.o. male being evaluated by a Virtual Visit (video visit) encounter, including two-way audio and video communication, in lieu of an in-person visit due to coronavirus emergency, to address concerns as mentioned in history and assessment and plan. Patient identification was verified at the start of the visit. I conducted an interview, performed a limited exam by video and educated the patient on my assessment and plan. Due to this being a TeleHealth encounter (During PMARZ-96 public health emergency), evaluation of the following organ systems was limited: Vitals/Constitutional/EENT/Resp/CV/GI//MS/Neuro/Skin/Heme-Lymph-Imm. Pursuant to the emergency declaration under the [address] [place] [address] [facility] authority and the [employer] and [employer] Act, this Virtual Visit was conducted with patient's (and/or legal guardian's) consent, to reduce the patient's risk of exposure to COVID-19 and provide necessary medical care.       The patient (and/or legal guardian) has also been advised to contact this office for worsening conditions or problems, and seek emergency medical

## 2020-11-20 NOTE — TELEPHONE ENCOUNTER
Requested Prescriptions     Pending Prescriptions Disp Refills    SITagliptin (SAMANTHAUVIA) 100 MG tablet 90 tablet 1     Sig: TAKE 1 TABLET BY MOUTH ONE TIME A DAY     LAST OV: 08/27/2020  LAST REFILL: 8/27/2020  NEXT OV: NONE

## 2021-01-13 DIAGNOSIS — E78.2 MIXED HYPERLIPIDEMIA: ICD-10-CM

## 2021-01-13 NOTE — TELEPHONE ENCOUNTER
[facility] pharm lvm to remove KYRA on Vascepa med. PT wants the generic because the Brand is over $300.  Call Katerinayess[facility]

## 2021-01-14 RX ORDER — ICOSAPENT ETHYL 1000 MG/1
CAPSULE ORAL
Qty: 360 CAPSULE | Refills: 1 | Status: SHIPPED | OUTPATIENT
Start: 2021-01-14 | End: 2021-07-26

## 2021-01-21 ENCOUNTER — TELEPHONE (OUTPATIENT)
Dept: ENDOCRINOLOGY | Age: 66
End: 2021-01-21

## 2021-01-29 NOTE — TELEPHONE ENCOUNTER
1/29/2021      To whom it may concern:    Mr. Barbra Brown is under my care for diabetes and related medical conditions management for a long time. Patient has a long standing history of hyperlipidemia, with severely elevated triglycerides, as well as elevated LDL and low HDL cholesterol. Patient has family history significant for cardiovascular disease at young age, his mother had heart attack at age 48. Several family members on mother's side also had premature cardiovascular disease. The other risk factors include diabetes with microvascular complications as neuropathy, nephropathy, presenting with proteinuria, also Parkinson's disease, hypogonadism. Current medications include atorvastatin, fenofibrate and Vascepa. Patient's triglycerides level were 1222, and with a combination of these medications improved to 165. As you see, this is a significant improvement, which comes with decreased risk of pancreatitis, cardiovascular disease and other macrovascular complications. The other improvements were LDL cholesterol decreasing from 141 to 69, and HDL cholesterol increasing from 25 to 29. In summary, patient has been on Vascepa with improvement as stated above, and it is medically necessary for continuation of care to continue Vascepa to maintain decrease in risk and improvement of lipid management as noted. Let me know if you have any other questions.     Sincerely,      Eleni Soria MD

## 2021-02-02 NOTE — TELEPHONE ENCOUNTER
Here is the appeal information:    For a Standard Appeal: You, your prescriber, or your representative can file an appeal by telephone,  by fax, through the plans website, or by sending a letter to the mailing address listed below. Phone: [phone]  TTY: 365  Fax: [phone]  [employer] Website: [email or web address]  Address: [address] [place] [address], [address] [place] [address]    You should include your name, address, Member number, the reasons for appealing, and any evidence  you wish to attach. Remember, your doctor must provide us with a supporting statement if youre  requesting an exception to a coverage rule. You should include information about why the coverage rule  should not apply to you because of your specific medical condition.

## 2021-04-15 DIAGNOSIS — E11.40 TYPE 2 DIABETES, CONTROLLED, WITH NEUROPATHY (HCC): ICD-10-CM

## 2021-05-11 RX ORDER — LANCETS 33 GAUGE
EACH MISCELLANEOUS
Qty: 400 EACH | Refills: 0 | Status: SHIPPED | OUTPATIENT
Start: 2021-05-11

## 2021-05-12 DIAGNOSIS — E78.2 MIXED HYPERLIPIDEMIA: ICD-10-CM

## 2021-05-12 RX ORDER — FENOFIBRATE 160 MG/1
TABLET ORAL
Qty: 90 TABLET | Refills: 0 | Status: SHIPPED | OUTPATIENT
Start: 2021-05-12 | End: 2021-08-20

## 2021-05-12 NOTE — TELEPHONE ENCOUNTER
Requested Prescriptions     Pending Prescriptions Disp Refills    fenofibrate (TRIGLIDE) 160 MG tablet [Pharmacy Med Name: Fenofibrate Oral Tablet 160 MG] 90 tablet 0     Sig: TAKE 1 TABLET BY MOUTH ONE TIME A DAY         LAST OV: 8/27/2020  NEXT OV: 5/28/2021

## 2021-05-17 NOTE — TELEPHONE ENCOUNTER
Requested Prescriptions     Pending Prescriptions Disp Refills    JARDIANCE 25 MG tablet [Pharmacy Med Name: Jardiance Oral Tablet 25 MG] 90 tablet 0     Sig: TAKE 1 TABLET BY MOUTH ONE TIME A DAY       Lov; 8/27/2020  FOV;5/28/2021

## 2021-05-18 RX ORDER — EMPAGLIFLOZIN 25 MG/1
TABLET, FILM COATED ORAL
Qty: 90 TABLET | Refills: 0 | Status: SHIPPED | OUTPATIENT
Start: 2021-05-18 | End: 2021-06-03

## 2021-05-20 DIAGNOSIS — E11.40 TYPE 2 DIABETES, CONTROLLED, WITH NEUROPATHY (HCC): ICD-10-CM

## 2021-05-20 DIAGNOSIS — E23.0 HYPOGONADOTROPIC HYPOGONADISM (HCC): ICD-10-CM

## 2021-05-20 DIAGNOSIS — E55.9 VITAMIN D DEFICIENCY: ICD-10-CM

## 2021-05-20 DIAGNOSIS — Z79.4 CONTROLLED TYPE 2 DIABETES MELLITUS WITH DIABETIC NEPHROPATHY, WITH LONG-TERM CURRENT USE OF INSULIN (HCC): ICD-10-CM

## 2021-05-20 DIAGNOSIS — E78.2 MIXED HYPERLIPIDEMIA: ICD-10-CM

## 2021-05-20 DIAGNOSIS — E11.21 CONTROLLED TYPE 2 DIABETES MELLITUS WITH DIABETIC NEPHROPATHY, WITH LONG-TERM CURRENT USE OF INSULIN (HCC): ICD-10-CM

## 2021-05-20 LAB
A/G RATIO: 2.2 (ref 1.1–2.2)
ALBUMIN SERPL-MCNC: 5.1 G/DL (ref 3.4–5)
ALP BLD-CCNC: 53 U/L (ref 40–129)
ALT SERPL-CCNC: 26 U/L (ref 10–40)
ANION GAP SERPL CALCULATED.3IONS-SCNC: 15 MMOL/L (ref 3–16)
AST SERPL-CCNC: 23 U/L (ref 15–37)
BILIRUB SERPL-MCNC: 0.3 MG/DL (ref 0–1)
BUN BLDV-MCNC: 24 MG/DL (ref 7–20)
CALCIUM SERPL-MCNC: 9.8 MG/DL (ref 8.3–10.6)
CHLORIDE BLD-SCNC: 99 MMOL/L (ref 99–110)
CHOLESTEROL, TOTAL: 119 MG/DL (ref 0–199)
CO2: 23 MMOL/L (ref 21–32)
CREAT SERPL-MCNC: 0.9 MG/DL (ref 0.8–1.3)
GFR AFRICAN AMERICAN: >60
GFR NON-AFRICAN AMERICAN: >60
GLOBULIN: 2.3 G/DL
GLUCOSE BLD-MCNC: 111 MG/DL (ref 70–99)
HCT VFR BLD CALC: 44.9 % (ref 40.5–52.5)
HDLC SERPL-MCNC: 34 MG/DL (ref 40–60)
HEMOGLOBIN: 15.2 G/DL (ref 13.5–17.5)
LDL CHOLESTEROL CALCULATED: 54 MG/DL
MCH RBC QN AUTO: 29.4 PG (ref 26–34)
MCHC RBC AUTO-ENTMCNC: 33.8 G/DL (ref 31–36)
MCV RBC AUTO: 87 FL (ref 80–100)
PDW BLD-RTO: 14.5 % (ref 12.4–15.4)
PLATELET # BLD: 248 K/UL (ref 135–450)
PMV BLD AUTO: 8.4 FL (ref 5–10.5)
POTASSIUM SERPL-SCNC: 4.7 MMOL/L (ref 3.5–5.1)
RBC # BLD: 5.17 M/UL (ref 4.2–5.9)
SODIUM BLD-SCNC: 137 MMOL/L (ref 136–145)
T4 FREE: 1.6 NG/DL (ref 0.9–1.8)
TOTAL PROTEIN: 7.4 G/DL (ref 6.4–8.2)
TRIGL SERPL-MCNC: 156 MG/DL (ref 0–150)
TSH SERPL DL<=0.05 MIU/L-ACNC: 1.48 UIU/ML (ref 0.27–4.2)
VITAMIN D 25-HYDROXY: 67.7 NG/ML
VLDLC SERPL CALC-MCNC: 31 MG/DL
WBC # BLD: 8.5 K/UL (ref 4–11)

## 2021-05-21 LAB
ESTIMATED AVERAGE GLUCOSE: 134.1 MG/DL
HBA1C MFR BLD: 6.3 %

## 2021-05-22 LAB
SEX HORMONE BINDING GLOBULIN: 32 NMOL/L (ref 11–80)
TESTOSTERONE FREE PERCENT: 1.6 % (ref 1.6–2.9)
TESTOSTERONE FREE, CALC: 27 PG/ML (ref 47–244)
TESTOSTERONE TOTAL-MALE: 165 NG/DL (ref 300–720)
TESTOSTERONE, BIOAVAILABLE: 87 NG/DL (ref 131–682)

## 2021-05-24 DIAGNOSIS — E11.40 TYPE 2 DIABETES, CONTROLLED, WITH NEUROPATHY (HCC): ICD-10-CM

## 2021-05-28 ENCOUNTER — OFFICE VISIT (OUTPATIENT)
Dept: ENDOCRINOLOGY | Age: 66
End: 2021-05-28
Payer: MEDICARE

## 2021-05-28 VITALS
SYSTOLIC BLOOD PRESSURE: 124 MMHG | WEIGHT: 225 LBS | BODY MASS INDEX: 32.21 KG/M2 | OXYGEN SATURATION: 96 % | DIASTOLIC BLOOD PRESSURE: 80 MMHG | HEIGHT: 70 IN | HEART RATE: 64 BPM

## 2021-05-28 DIAGNOSIS — Z79.4 CONTROLLED TYPE 2 DIABETES MELLITUS WITH DIABETIC NEPHROPATHY, WITH LONG-TERM CURRENT USE OF INSULIN (HCC): ICD-10-CM

## 2021-05-28 DIAGNOSIS — E04.9 GOITER: ICD-10-CM

## 2021-05-28 DIAGNOSIS — E55.9 VITAMIN D DEFICIENCY: ICD-10-CM

## 2021-05-28 DIAGNOSIS — E66.9 CLASS 1 OBESITY WITH SERIOUS COMORBIDITY AND BODY MASS INDEX (BMI) OF 32.0 TO 32.9 IN ADULT, UNSPECIFIED OBESITY TYPE: ICD-10-CM

## 2021-05-28 DIAGNOSIS — E23.0 HYPOGONADOTROPIC HYPOGONADISM (HCC): ICD-10-CM

## 2021-05-28 DIAGNOSIS — E11.21 CONTROLLED TYPE 2 DIABETES MELLITUS WITH DIABETIC NEPHROPATHY, WITH LONG-TERM CURRENT USE OF INSULIN (HCC): ICD-10-CM

## 2021-05-28 DIAGNOSIS — E11.40 TYPE 2 DIABETES, CONTROLLED, WITH NEUROPATHY (HCC): Primary | ICD-10-CM

## 2021-05-28 DIAGNOSIS — E78.2 MIXED HYPERLIPIDEMIA: ICD-10-CM

## 2021-05-28 PROCEDURE — 99214 OFFICE O/P EST MOD 30 MIN: CPT | Performed by: INTERNAL MEDICINE

## 2021-05-28 NOTE — PROGRESS NOTES
Aj Mckenzie is a 63 y.o. male who presents for Type 2 diabetes mellitus. Current symptoms/problems include hyperglycemia and is unchanged. 1.  Type 2 diabetes, controlled, with neuropathy ([facility] [address] [E11.40]    Diagnosed with Type 2 diabetes mellitus in 2008.     Comorbid conditions: hyperlipidemia and Neuropathy    Current diabetic medications include: metformin, Januvia, Jardiance    Intolerance to diabetes medications: No     Weight trend: stable  Prior visit with dietician: yes  Current diet: on average, 3 meals per day  Current exercise: frequent     Current monitoring regimen: home blood tests - 1 times daily   Has brought blood glucose log/meter:  No  Home blood sugar records: fasting range:   and postprandial range:   Any episodes of hypoglycemia? Yes  Hypoglycemia frequency and time(s):  Very rare  Does patient have Glucagon emergency kit? No  Does patient have rapid acting carbohydrate? No  Does patient wear a medic alert bracelet or necklace? No    2. Mixed hyperlipidemia  No muscle pain. Has cramps. 3. Hypogonadotropic hypogonadism (HCC)  Has weakness. 4. Vitamin D deficiency  No bone pain. 5. Obesity  Eats healthy, exercises. 6. Goiter  Has difficulty swallowing. Has hoarseness. Aunt had thyroid cancer  No radiation to his neck.     Lab Results   Component Value Date    LABA1C 6.3 05/20/2021     Lab Results   Component Value Date    .1 05/20/2021         Past Medical History:   Diagnosis Date    Bilateral swelling of feet     Generalized headaches     Goiter     Hemorrhoids     Hiatal hernia     Hyperlipidemia     Impaired fasting glucose     Neuropathy     Other and unspecified anterior pituitary hyperfunction     Other anterior pituitary disorders     Other testicular hypofunction     Other testicular hypofunction     Parkinson's disease     Reflux     Sarcoidosis       Patient Active Problem List   Diagnosis    Mixed hyperlipidemia    Diabetic polyneuropathy (HCC)    Hypogonadotropic hypogonadism (HCC)    Type 2 diabetes, controlled, with neuropathy ([address]    Parkinson's disease ([address]    Atypical Parkinsonism ([address]    Proteinuria    Vitamin D deficiency    Class 1 obesity with body mass index (BMI) of 31.0 to 31.9 in adult    Controlled type 2 diabetes mellitus with diabetic nephropathy, with long-term current use of insulin ([address]    Goiter     Past Surgical History:   Procedure Laterality Date    BRAIN SURGERY      brainstem    CATARACT REMOVAL  12-10-14    COLONOSCOPY  ca 2009    no polyps.  EXTERNAL EAR SURGERY      EYE SURGERY      FOOT SURGERY      SKIN BIOPSY      SKIN BIOPSY      TONSILLECTOMY      UMBILICAL HERNIA REPAIR  8/63/87    umbillical hernia repair with mesh     Social History     Socioeconomic History    Marital status:      Spouse name: Not on file    Number of children: 2    Years of education: Not on file    Highest education level: Not on file   Occupational History    Occupation: retired    Tobacco Use    Smoking status: Never Smoker    Smokeless tobacco: Never Used   Vaping Use    Vaping Use: Never used   Substance and Sexual Activity    Alcohol use: No    Drug use: No    Sexual activity: Yes     Partners: Female   Other Topics Concern    Not on file   Social History Narrative    Not on file     Social Determinants of Health     Financial Resource Strain:     Difficulty of Paying Living Expenses:    Food Insecurity:     Worried About Running Out of Food in the Last Year:     [address] in the Last Year:    Transportation Needs:     Lack of Transportation (Medical):      Lack of Transportation (Non-Medical):    Physical Activity:     Days of Exercise per Week:     Minutes of Exercise per Session:    Stress:     Feeling of Stress :    Social Connections:     Frequency of Communication with Friends and Family:     Frequency of Social Gatherings with Friends and Family:     Attends [employer] Services:     Active Member of Clubs or Organizations:     Attends Club or Organization Meetings:     Marital Status:    Intimate Partner Violence:     Fear of Current or Ex-Partner:     Emotionally Abused:     Physically Abused:     Sexually Abused:      Family History   Problem Relation Age of Onset    Diabetes Mother     Thyroid Disease Paternal Aunt     Cancer Paternal Aunt     Diabetes Sister     Thyroid Disease Sister     Stroke Brother     Diabetes Maternal Aunt     Diabetes Maternal Uncle     Diabetes Maternal Grandmother     Diabetes Maternal Grandfather     High Blood Pressure Neg Hx     Heart Disease Neg Hx      Current Outpatient Medications   Medication Sig Dispense Refill    metFORMIN (GLUCOPHAGE) 1000 MG tablet TAKE 1 TABLET BY MOUTH TWO TIMES A DAY WITH MEALS 180 tablet 0    JARDIANCE 25 MG tablet TAKE 1 TABLET BY MOUTH ONE TIME A DAY  90 tablet 0    fenofibrate (TRIGLIDE) 160 MG tablet TAKE 1 TABLET BY MOUTH ONE TIME A DAY  90 tablet 0    Lancets (ONETOUCH DELICA PLUS [id number]) MISC USE TO TEST BLOOD SUGAR FOUR TIMES A  each 0    Cholecalciferol (VITAMIN D3) 125 MCG (5000 UT) TABS TAKE 1 TABLET BY MOUTH FOUR DAYS A WEEK AND TAKE 2 TABLETS BY MOUTH 3 DAYS A WEEK 150 tablet 0    Icosapent Ethyl (VASCEPA) 1 g CAPS capsule TAKE 2 CAPSULES BY MOUTH TWO TIMES A  capsule 1    SITagliptin (JANUVIA) 100 MG tablet TAKE 1 TABLET BY MOUTH ONE TIME A DAY 90 tablet 1    hydroxychloroquine (PLAQUENIL) 200 MG tablet TAKE 2 TABLETS BY MOUTH ONE TIME A DAY with breakfast      lisinopril (PRINIVIL;ZESTRIL) 10 MG tablet TAKE 1 TABLET BY MOUTH ONE TIME A DAY 90 tablet 1    rosuvastatin (CRESTOR) 20 MG tablet TAKE 1 TABLET BY MOUTH ONE TIME A DAY 90 tablet 1    testosterone cypionate (DEPOTESTOTERONE CYPIONATE) 200 MG/ML injection INJECT 1 ML INTO THE MUSCLE EVERY 14 DAYS 10 mL 1    ONE TOUCH ULTRA TEST strip 4 times daily.  400 strip 3    cough  Cardiovascular: no chest pain, no lower extremity edema, no orthopnea, no intermittent leg claudication, no palpitations  Gastrointestinal: no abdominal pain, no nausea, no vomiting, no diarrhea, no constipation, no dysphagia, no heartburn, no bloating  Genitourinary: no dysuria, no urinary incontinence, no urinary hesitancy, no urinary frequency, no feelings of urinary urgency, no nocturia  Musculoskeletal: no joint swelling, no joint stiffness, has joint pain, has muscle cramps, has muscle pain, no bone pain  Integument/Breast: no skin rashes, no skin lesions, no itching, no dry skin, no breast pain, no breast mass, no skin hives, no skin discoloration, no nipple discharge  Neurological: no numbness, no tingling, no weakness, no confusion, has headaches, has dizziness, no fainting, has tremors, has decrease in memory, has balance problems  Psychiatric: has anxiety, no depression, has insomnia  Hematologic/Lymphatic: no tendency for easy bleeding, no swollen lymph nodes, no tendency for easy bruising  Immunology: no seasonal allergies, no frequent infections, no frequent illnesses  Endocrine: no temperature intolerance     OBJECTIVE:  /80   Pulse 64   Ht 5' 10\" (1.778 m)   Wt 225 lb (102.1 kg)   SpO2 96%   BMI 32.28 kg/m²      Wt Readings from Last 3 Encounters:   05/28/21 225 lb (102.1 kg)   11/18/19 219 lb 6.4 oz (99.5 kg)   08/16/19 226 lb 12.8 oz (102.9 kg)         Constitutional: no acute distress, well appearing and well nourished  Psychiatric: oriented to person, place and time, judgement and insight and normal, recent and remote memory and intact and mood and affect are normal  Skin: skin and subcutaneous tissue is normal without mass, normal turgor  Head and Face: examination of head and face revealed no abnormalities  Eyes: no lid or conjunctival swelling, erythema or discharge, pupils are normal, equal, round, reactive to light  Ears/Nose: external inspection of ears and nose revealed no abnormalities, hearing is grossly normal  Oropharynx/Mouth/Face: lips, tongue and gums are normal with no lesions, the voice quality was normal  Neck: neck is supple and symmetric, with midline trachea and no masses, thyroid is enlarged  Lymphatics: normal cervical lymph nodes, normal supraclavicular nodes  Pulmonary: no increased work of breathing or signs of respiratory distress, lungs are clear to auscultation  Cardiovascular: normal heart rate and rhythm, normal S1 and S2, no murmurs and pedal pulses and 2+ bilaterally, no edema  Abdomen: abdomen is soft, non-tender with no masses  Musculoskeletal: normal gait and station and exam of the digits and nails are normal  Neurological: normal coordination and normal general cortical function    Visual inspection:  Deformity/amputation: absent  Skin lesions/pre-ulcerative calluses: absent  Edema: right- negative, left- negative    Sensory exam:  Monofilament sensation: abnormal on the right, normal on the left  (minimum of 5 random plantar locations tested, avoiding callused areas - > 1 area with absence of sensation is + for neuropathy)     Plus at least one of the following:  Pulses: normal  Proprioception: abnormal on the right, normal on the left  Vibration (128 Hz): decreased on the right, decreased on the left  High risk feet    ASSESSMENT/PLAN:  1. Type 2 diabetes, controlled, with neuropathy (HCC)  HbA1C 13.1-7.6-6.3  - Jardiance 25 mg daily. - Januvia 100 mg  - metFORMIN (GLUCOPHAGE) 1000 MG tablet; Take 1 tablet by mouth 2 times daily (with meals)   - Hemoglobin A1C; Future  - Comprehensive Metabolic Panel; Future  - Microalbumin / Creatinine Urine Ratio; Future    2. Mixed hyperlipidemia  [phone]  -[phone]  HDL 25-22-35-34  Rosuvastatin 20 mg  Improved. Diet. Patient has muscle weakness, pain and cramping. Has Parkinson's.  - fenofibrate 160 MG tablet;  Take 1 tablet by mouth daily    - Vascepa 2 caps bid  - Lipid Panel; Future    3. Hypogonadotropic hypogonadism (HCC)  Uncontrolled. Forgets at times. Testosterone [phone]  - testosterone cypionate (DEPOTESTOTERONE CYPIONATE) 200 MG/ML injection; Inject 1 mL into the muscle every 14 days. - CBC; Future  - Testosterone, free, total; Future    4. Vitamin D deficiency  Uncontrolled. 25OH vit D 40-31-67.7  Supplements 5000 IU daily. Decrease vitamin D to 5000 IU daily  - Vitamin D 25 Hydroxy; Future    5. Obesity  Diet, exercise. 6.  Nephropathy  Follow micr/creat ratio. Lisinopril 10 mg qd    7. Goiter  2019 Thyroid sono did not show nodules. Unlikely difficulty swallowing and hoarseness are related to thyroid. Right lobe normal, left lobe upper limit of normal.  TSH 0.8-1.48    Reviewed and/or ordered clinical lab results Yes  Reviewed and/or ordered radiology tests No  Reviewed and/or ordered other diagnostic tests No  Discussed test results with performing physician No  Independently reviewed image, tracing, or specimen No  Made a decision to obtain old records Yes  Reviewed and summarized old records Yes  Dx in 2008. Obtained history from other than patient No    Deo Mcpherson was counseled regarding symptoms of diabetes diagnosis, course and complications of disease if inadequately treated, side effects of medications, diagnosis, treatment options, and prognosis, risks, benefits, complications, and alternatives of treatment, labs, imaging and other studies and treatment targets and goals. He understands instructions and counseling. Return in about 6 months (around 11/28/2021) for diabetes.

## 2021-06-01 RX ORDER — LISINOPRIL 10 MG/1
TABLET ORAL
Qty: 90 TABLET | Refills: 0 | Status: SHIPPED | OUTPATIENT
Start: 2021-06-01 | End: 2021-09-01

## 2021-06-01 NOTE — TELEPHONE ENCOUNTER
Requested Prescriptions     Pending Prescriptions Disp Refills    lisinopril (PRINIVIL;ZESTRIL) 10 MG tablet [Pharmacy Med Name: Lisinopril Oral Tablet 10 MG] 90 tablet 0     Sig: TAKE 1 TABLET BY MOUTH ONE TIME A DAY         LAST OV:  05/28/2021  NEXT OV: 11/29/2021

## 2021-06-03 RX ORDER — EMPAGLIFLOZIN 25 MG/1
TABLET, FILM COATED ORAL
Qty: 90 TABLET | Refills: 0 | Status: SHIPPED | OUTPATIENT
Start: 2021-06-03 | End: 2021-09-17

## 2021-06-03 NOTE — TELEPHONE ENCOUNTER
Requested Prescriptions     Pending Prescriptions Disp Refills    empagliflozin (JARDIANCE) 25 MG tablet [Pharmacy Med Name: Jardiance Oral Tablet 25 MG] 90 tablet 0     Sig: TAKE 1 TABLET BY MOUTH EVERY DAY         LOV: 05/28/2021    FOV: 11/29/2021

## 2021-06-04 DIAGNOSIS — E11.40 TYPE 2 DIABETES, CONTROLLED, WITH NEUROPATHY (HCC): ICD-10-CM

## 2021-06-10 DIAGNOSIS — E78.2 MIXED HYPERLIPIDEMIA: ICD-10-CM

## 2021-06-10 RX ORDER — ROSUVASTATIN CALCIUM 20 MG/1
TABLET, COATED ORAL
Qty: 90 TABLET | Refills: 0 | Status: SHIPPED | OUTPATIENT
Start: 2021-06-10 | End: 2021-06-15

## 2021-06-10 NOTE — TELEPHONE ENCOUNTER
Requested Prescriptions     Pending Prescriptions Disp Refills    rosuvastatin (CRESTOR) 20 MG tablet [Pharmacy Med Name: Rosuvastatin Calcium Oral Tablet 20 MG] 90 tablet 0     Sig: TAKE 1 TABLET BY MOUTH ONE TIME A DAY         FOV: 11/29/2021    LOV:  05/28/2021

## 2021-06-15 DIAGNOSIS — E78.2 MIXED HYPERLIPIDEMIA: ICD-10-CM

## 2021-06-15 RX ORDER — ROSUVASTATIN CALCIUM 20 MG/1
TABLET, COATED ORAL
Qty: 90 TABLET | Refills: 0 | Status: SHIPPED | OUTPATIENT
Start: 2021-06-15 | End: 2022-01-11

## 2021-07-24 DIAGNOSIS — E78.2 MIXED HYPERLIPIDEMIA: ICD-10-CM

## 2021-07-26 RX ORDER — ICOSAPENT ETHYL 1000 MG/1
CAPSULE ORAL
Qty: 360 CAPSULE | Refills: 0 | Status: SHIPPED | OUTPATIENT
Start: 2021-07-26 | End: 2021-11-03

## 2021-07-26 NOTE — TELEPHONE ENCOUNTER
Requested Prescriptions     Pending Prescriptions Disp Refills    Icosapent Ethyl (VASCEPA) 1 g CAPS capsule [Pharmacy Med Name: Vascepa Oral Capsule 1 GM] 360 capsule 0     Sig: TAKE 2 CAPSULES BY MOUTH TWO TIMES A DAY     Last OV 5/28/21  Next OV 11/29/21  Last refill 1/14/21  Last labs 5/20/21

## 2021-08-03 ENCOUNTER — OFFICE VISIT (OUTPATIENT)
Dept: ORTHOPEDIC SURGERY | Age: 66
End: 2021-08-03
Payer: MEDICARE

## 2021-08-03 VITALS — BODY MASS INDEX: 32.22 KG/M2 | WEIGHT: 225.09 LBS | HEIGHT: 70 IN

## 2021-08-03 DIAGNOSIS — M94.261 CHONDROMALACIA, KNEE, RIGHT: ICD-10-CM

## 2021-08-03 DIAGNOSIS — S83.241A ACUTE MEDIAL MENISCUS TEAR OF RIGHT KNEE, INITIAL ENCOUNTER: Primary | ICD-10-CM

## 2021-08-03 DIAGNOSIS — M25.461 EFFUSION OF RIGHT KNEE: ICD-10-CM

## 2021-08-03 DIAGNOSIS — M25.561 ACUTE PAIN OF RIGHT KNEE: ICD-10-CM

## 2021-08-03 PROCEDURE — 99204 OFFICE O/P NEW MOD 45 MIN: CPT | Performed by: INTERNAL MEDICINE

## 2021-08-03 PROCEDURE — L1812 KO ELASTIC W/JOINTS PRE OTS: HCPCS | Performed by: INTERNAL MEDICINE

## 2021-08-03 RX ORDER — MELOXICAM 15 MG/1
15 TABLET ORAL DAILY
Qty: 30 TABLET | Refills: 2 | Status: ON HOLD | OUTPATIENT
Start: 2021-08-03 | End: 2021-09-24 | Stop reason: HOSPADM

## 2021-08-03 NOTE — PROGRESS NOTES
Chief Complaint:   Chief Complaint   Patient presents with    Knee Pain     right, h/o PD, slipped and fell and twisted knee, still painful at ant and medical knee, tight post knee, unable to do daily walks          History of Present Illness:       Patient is a 61 y.o. male presents with the above complaint. The symptoms began 3 weeksago and started with an injury. The patient describes a sharp, aching pain that does not radiate. The symptoms are constant  and are show no change since the onset. He twisted his knee related to a fall losing his balance which has been a common occurrence for him related to his parkinsonism. He presented to urgent care x-rays were performed and he presents here for further evaluation. Pain localizes to the medial side of the knee and  does not seems to follow a typical patella femoral provacative pattern. There are mechanical symptoms that suggest meniscal injury. The patient admits to subjective instability about the knee and admits to new onset weakness of the lower extremity. Pain level 5    The patient admits to a pattern of activity related swelling. Treatment to date: none. There is prior history of remote knee trauma. There is no prior history of autoimmune disease, inflammatory arthropathy, or crystal arthropathy. Past Medical History:        Past Medical History:   Diagnosis Date    Bilateral swelling of feet     Generalized headaches     Goiter     Hemorrhoids     Hiatal hernia     Hyperlipidemia     Impaired fasting glucose     Neuropathy     Other and unspecified anterior pituitary hyperfunction     Other anterior pituitary disorders     Other testicular hypofunction     Other testicular hypofunction     Parkinson's disease     Reflux     Sarcoidosis          Past Surgical History:   Procedure Laterality Date    BRAIN SURGERY      brainstem    CATARACT REMOVAL  12-10-14    COLONOSCOPY  ca 2009    no polyps.      EXTERNAL EAR SURGERY      EYE SURGERY      FOOT SURGERY      SKIN BIOPSY      SKIN BIOPSY      TONSILLECTOMY      UMBILICAL HERNIA REPAIR  3/64/41    umbillical hernia repair with mesh         Present Medications:         Current Outpatient Medications   Medication Sig Dispense Refill    meloxicam (MOBIC) 15 MG tablet Take 1 tablet by mouth daily 30 tablet 2    Icosapent Ethyl (VASCEPA) 1 g CAPS capsule TAKE 2 CAPSULES BY MOUTH TWO TIMES A  capsule 0    rosuvastatin (CRESTOR) 20 MG tablet TAKE 1 TABLET BY MOUTH ONE TIME A DAY  90 tablet 0    SITagliptin (JANUVIA) 100 MG tablet TAKE 1 TABLET BY MOUTH ONE TIME DAILY 90 tablet 0    empagliflozin (JARDIANCE) 25 MG tablet TAKE 1 TABLET BY MOUTH EVERY DAY 90 tablet 0    lisinopril (PRINIVIL;ZESTRIL) 10 MG tablet TAKE 1 TABLET BY MOUTH ONE TIME A DAY  90 tablet 0    metFORMIN (GLUCOPHAGE) 1000 MG tablet TAKE 1 TABLET BY MOUTH TWO TIMES A DAY WITH MEALS 180 tablet 0    fenofibrate (TRIGLIDE) 160 MG tablet TAKE 1 TABLET BY MOUTH ONE TIME A DAY  90 tablet 0    Lancets (ONETOUCH DELICA PLUS [id number]) MISC USE TO TEST BLOOD SUGAR FOUR TIMES A  each 0    Cholecalciferol (VITAMIN D3) 125 MCG (5000 UT) TABS TAKE 1 TABLET BY MOUTH FOUR DAYS A WEEK AND TAKE 2 TABLETS BY MOUTH 3 DAYS A WEEK 150 tablet 0    hydroxychloroquine (PLAQUENIL) 200 MG tablet TAKE 2 TABLETS BY MOUTH ONE TIME A DAY with breakfast      testosterone cypionate (DEPOTESTOTERONE CYPIONATE) 200 MG/ML injection INJECT 1 ML INTO THE MUSCLE EVERY 14 DAYS 10 mL 1    ONE TOUCH ULTRA TEST strip 4 times daily. 400 strip 3    Syringe, Disposable, 3 ML MISC Once every 2 weeks 100 each 3    Lancets (ONETOUCH DELICA PLUS [id number]) MISC use to test blood sugar 4 times daily 100 each 0    BREO ELLIPTA 200-25 MCG/INH AEPB INHALE 1 PUFF BY MOUTH ONE TIME A DAY  11    Blood Glucose Monitoring Suppl (ONE TOUCH ULTRA 2) w/Device KIT To check blood glucose 4 times daily.  1 kit 0    FREESTYLE LANCETS MISC 1 Units by Does not apply route 4 times daily 400 each 5    Blood Glucose Monitoring Suppl (FREESTYLE LITE) LIDIA 1 Device by Does not apply route 4 times daily 1 Device 0    propranolol (INDERAL) 80 MG tablet Take 80 mg by mouth daily       Probiotic Product (PROBIOTIC-10 PO) Take 10 mg by mouth daily (Patient not taking: Reported on 5/28/2021)      Cinnamon Bark POWD 500 mg by Does not apply route daily (Patient not taking: Reported on 5/28/2021)      carbidopa-levodopa (SINEMET)  MG per tablet Take 2 tablets by mouth 4 times daily 1.5 tablets       aspirin 81 MG EC tablet Take 81 mg by mouth daily. Last dose 3/9/15       No current facility-administered medications for this visit. Allergies:      No Known Allergies     Social History:         Social History     Socioeconomic History    Marital status:      Spouse name: Not on file    Number of children: 2    Years of education: Not on file    Highest education level: Not on file   Occupational History    Occupation: retired    Tobacco Use    Smoking status: Never Smoker    Smokeless tobacco: Never Used   Vaping Use    Vaping Use: Never used   Substance and Sexual Activity    Alcohol use: No    Drug use: No    Sexual activity: Yes     Partners: Female   Other Topics Concern    Not on file   Social History Narrative    Not on file     Social Determinants of Health     Financial Resource Strain:     Difficulty of Paying Living Expenses:    Food Insecurity:     Worried About Running Out of Food in the Last Year:     [address] in the Last Year:    Transportation Needs:     Lack of Transportation (Medical):      Lack of Transportation (Non-Medical):    Physical Activity:     Days of Exercise per Week:     Minutes of Exercise per Session:    Stress:     Feeling of Stress :    Social Connections:     Frequency of Communication with Friends and Family:     Frequency of Social Gatherings with Friends and Family:     Attends Sikh Services:     Active Member of Clubs or Organizations:     Attends Club or Organization Meetings:     Marital Status:    Intimate Partner Violence:     Fear of Current or Ex-Partner:     Emotionally Abused:     Physically Abused:     Sexually Abused:         Review of Symptoms:    Pertinent items are noted in HPI    Review of systems reviewed from Patient History Form dated on days date and   available in the patient's chart under the Media tab. Vital Signs: There were no vitals filed for this visit. General Exam:     Constitutional: Patient is adequately groomed with no evidence of malnutrition  Mental Status: The patient is oriented to time, place and person. The patient's mood and affect are appropriate. Vascular: Examination reveals no swelling or calf tenderness. Peripheral pulses are palpable and 2+. Lymphatics: no lymphadenopathy of the inguinal region or lower extremity      Physical Exam: right knee      Primary Exam:    Inspection: Trace effusion no deformity or atrophy      Palpation: Mild over the M JL      Range of Motion: 0/130 low-grade discomfort endrange flexion      Strength: Normal with SLR      Special Tests:   Lachman test negative, collateral ligament stressing stable, anterior/posterior drawer negative, medial Dorothea stressing positive, patella femoral provacative Negative      Skin: There are no rashes, ulcerations or lesions. Gait: Antalgic      Reflex intact lower     Additional Comments:        Additional Examinations:           Left Lower Extremity: Examination of the left lower extremity does not show any tenderness, deformity or injury. Range of motion is unremarkable. There is no gross instability. There are no rashes, ulcerations or lesions. Strength and tone are normal.   Neurolgic -Light touch sensation and manual muscle testing normal L2-S1. No fasiculations.  Pattella tendon and Achilles tendon reflexes +2 bilaterally. Seated SLR negative        Office Imaging Results/Procedures PerformedToday:           Office Procedures:     Orders Placed This Encounter   Procedures    MRI KNEE RIGHT WO CONTRAST     Proscan Theron, please call pt to schedule, [phone]  [facility] will obtain auth and fwd to your facility  Pt advised to f/u in clinic 2-3 days after MRI for results     Standing Status:   Future     Standing Expiration Date:   8/3/2022     Order Specific Question:   Reason for exam:     Answer:   r/o MMT    Breg Economy Hinged Knee Brace     Patient was prescribed a Breg Economy Hinged Knee Brace. The right knee will require stabilization / immobilization from this semi-rigid / rigid orthosis to improve their function. The orthosis will assist in protecting the affected area, provide functional support and facilitate healing. The patient was educated and fit by a healthcare professional with expert knowledge and specialization in brace application while under the direct supervision of the treating physician. Verbal and written instructions for the use of and application of this item were provided. They were instructed to contact the office immediately should the brace result in increased pain, decreased sensation, increased swelling or worsening of the condition. Other Outside Imaging and Testing Personally Reviewed:    DIAG-KNEE 3-VIEWS RT    Impression    IMPRESSION:   1. Mild patellofemoral and medial compartment osteoarthrosis of the right knee. Signed By: Nomi Hernandez MD, Junie Holder  Narrative    Clinical history: Acute pain of the right knee. Comparisons: None. FINDINGS:     3 projections of the right knee. There is no evidence of fracture or joint effusion. Mild medial and patellofemoral compartment osteoarthrosis. Superior patellar pole enthesopathy. No definite soft tissue abnormalities.   Other Result Text    Interface, Incoming Radiology Results - 07/22/2021  4:38 PM EDT Formatting of this note might be different from the original.   Clinical history: Acute pain of the right knee. Comparisons: None. FINDINGS:     3 projections of the right knee. There is no evidence of fracture or joint effusion. Mild medial and patellofemoral compartment osteoarthrosis. Superior patellar pole enthesopathy. No definite soft tissue abnormalities. IMPRESSION:   1. Mild patellofemoral and medial compartment osteoarthrosis of the right knee. Signed By: Radha Leigh MD     Date: 07/22/21   Received From: [facility]                Assessment   Impression: . Encounter Diagnoses   Name Primary?  Acute medial meniscus tear of right knee, initial encounter Yes    Chondromalacia, knee, right     Effusion of right knee     Acute pain of right knee               Plan:     MRI right knee evaluate severity of meniscal pathology  Functional knee bracing and continued use of cane assistance and fall precautions  Trial of meloxicam with GI precaution  Review patient's indication for Plaquenil on follow-up  Intra-articular steroid injection only for escalating pain levels    The nature of the finding, probable diagnosis and likely treatment was thoroughly discussed with the patient. The options, risks, complications, alternative treatment as well as some of the differential diagnosis was discussed. The patient was thoroughly informed and all questions were answered. the patient indicated understanding and satisfaction with the discussion.       Orders:        Orders Placed This Encounter   Procedures    MRI KNEE RIGHT WO CONTRAST     Geovani Crump, please call pt to schedule, [phone]  [facility] will obtain auth and fwd to your facility  Pt advised to f/u in clinic 2-3 days after MRI for results     Standing Status:   Future     Standing Expiration Date:   8/3/2022     Order Specific Question:   Reason for exam:     Answer:   r/o MMT    Breg Economy Hinged Knee Brace Patient was prescribed a Deligicg Zoutons Hinged Knee Brace. The right knee will require stabilization / immobilization from this semi-rigid / rigid orthosis to improve their function. The orthosis will assist in protecting the affected area, provide functional support and facilitate healing. The patient was educated and fit by a healthcare professional with expert knowledge and specialization in brace application while under the direct supervision of the treating physician. Verbal and written instructions for the use of and application of this item were provided. They were instructed to contact the office immediately should the brace result in increased pain, decreased sensation, increased swelling or worsening of the condition. Disclaimer: \"This note was dictated with voice recognition software. Though review and correction are routine, we apologize for any errors. \"

## 2021-08-18 ENCOUNTER — OFFICE VISIT (OUTPATIENT)
Dept: ORTHOPEDIC SURGERY | Age: 66
End: 2021-08-18
Payer: MEDICARE

## 2021-08-18 DIAGNOSIS — S83.231D COMPLEX TEAR OF MEDIAL MENISCUS OF RIGHT KNEE AS CURRENT INJURY, SUBSEQUENT ENCOUNTER: Primary | ICD-10-CM

## 2021-08-18 DIAGNOSIS — Q68.6 DISCOID LATERAL MENISCUS OF RIGHT KNEE: ICD-10-CM

## 2021-08-18 PROCEDURE — 99214 OFFICE O/P EST MOD 30 MIN: CPT | Performed by: INTERNAL MEDICINE

## 2021-08-18 NOTE — PROGRESS NOTES
Chief Complaint: No chief complaint on file. History of Present Illness:       Patient is a 61 y.o. male returns follow up for the above complaint. The patient was last seen approximately 2 weeksago. The symptoms show no change since the last visit. The patient has had further testing for the problem. In the interim MRI scan completed which is outlined below in detail    He continues to localize pain to the medial compartment region of the knee    His symptoms continue to show a meniscal provocative pattern.     No significant pattern of active related swelling    This is affected his ability to maintain recreationally active lifestyle    He would like to consider other treatment options     Past Medical History:        Past Medical History:   Diagnosis Date    Bilateral swelling of feet     Generalized headaches     Goiter     Hemorrhoids     Hiatal hernia     Hyperlipidemia     Impaired fasting glucose     Neuropathy     Other and unspecified anterior pituitary hyperfunction     Other anterior pituitary disorders     Other testicular hypofunction     Other testicular hypofunction     Parkinson's disease     Reflux     Sarcoidosis         Present Medications:         Current Outpatient Medications   Medication Sig Dispense Refill    meloxicam (MOBIC) 15 MG tablet Take 1 tablet by mouth daily 30 tablet 2    Icosapent Ethyl (VASCEPA) 1 g CAPS capsule TAKE 2 CAPSULES BY MOUTH TWO TIMES A  capsule 0    rosuvastatin (CRESTOR) 20 MG tablet TAKE 1 TABLET BY MOUTH ONE TIME A DAY  90 tablet 0    SITagliptin (JANUVIA) 100 MG tablet TAKE 1 TABLET BY MOUTH ONE TIME DAILY 90 tablet 0    empagliflozin (JARDIANCE) 25 MG tablet TAKE 1 TABLET BY MOUTH EVERY DAY 90 tablet 0    lisinopril (PRINIVIL;ZESTRIL) 10 MG tablet TAKE 1 TABLET BY MOUTH ONE TIME A DAY  90 tablet 0    metFORMIN (GLUCOPHAGE) 1000 MG tablet TAKE 1 TABLET BY MOUTH TWO TIMES A DAY WITH MEALS 180 tablet 0    fenofibrate (TRIGLIDE) 160 MG tablet TAKE 1 TABLET BY MOUTH ONE TIME A DAY  90 tablet 0    Lancets (ONETOUCH DELICA PLUS [id number]) MISC USE TO TEST BLOOD SUGAR FOUR TIMES A  each 0    Cholecalciferol (VITAMIN D3) 125 MCG (5000 UT) TABS TAKE 1 TABLET BY MOUTH FOUR DAYS A WEEK AND TAKE 2 TABLETS BY MOUTH 3 DAYS A WEEK 150 tablet 0    hydroxychloroquine (PLAQUENIL) 200 MG tablet TAKE 2 TABLETS BY MOUTH ONE TIME A DAY with breakfast      testosterone cypionate (DEPOTESTOTERONE CYPIONATE) 200 MG/ML injection INJECT 1 ML INTO THE MUSCLE EVERY 14 DAYS 10 mL 1    ONE TOUCH ULTRA TEST strip 4 times daily. 400 strip 3    Syringe, Disposable, 3 ML MISC Once every 2 weeks 100 each 3    Lancets (ONETOUCH DELICA PLUS [id number]) MISC use to test blood sugar 4 times daily 100 each 0    BREO ELLIPTA 200-25 MCG/INH AEPB INHALE 1 PUFF BY MOUTH ONE TIME A DAY  11    Blood Glucose Monitoring Suppl (ONE TOUCH ULTRA 2) w/Device KIT To check blood glucose 4 times daily. 1 kit 0    FREESTYLE LANCETS MISC 1 Units by Does not apply route 4 times daily 400 each 5    Blood Glucose Monitoring Suppl (FREESTYLE LITE) LIDIA 1 Device by Does not apply route 4 times daily 1 Device 0    propranolol (INDERAL) 80 MG tablet Take 80 mg by mouth daily       Probiotic Product (PROBIOTIC-10 PO) Take 10 mg by mouth daily (Patient not taking: Reported on 5/28/2021)      Cinnamon Bark POWD 500 mg by Does not apply route daily (Patient not taking: Reported on 5/28/2021)      carbidopa-levodopa (SINEMET)  MG per tablet Take 2 tablets by mouth 4 times daily 1.5 tablets       aspirin 81 MG EC tablet Take 81 mg by mouth daily. Last dose 3/9/15       No current facility-administered medications for this visit. Allergies:      No Known Allergies        Review of Systems:    Pertinent items are noted in HPI      Vital Signs: There were no vitals filed for this visit.      General Exam:     Constitutional: Patient is adequately groomed with no evidence of malnutrition    Physical Exam: right knee      Primary Exam:    Inspection: There is effusion no deformity or atrophy      Palpation: Focal tenderness over the M JL      Range of Motion: 0/125      Strength: Normal with SLR      Special Tests: Steinmann's test positive      Skin: There are no rashes, ulcerations or lesions. Gait: Near normal      Neurovascular - non focal and intact       Additional Comments:        Additional Examinations:                Office Imaging Results/Procedures PerformedToday:           Office Procedures:   No orders of the defined types were placed in this encounter. Other Outside Imaging and Testing Personally Reviewed:    MRI LOWER EXTREMITY W JT WO CONTRAST    Result Date: 2021  Site: [employer] [facility] #: [id number] #: [id number] Procedure: MR Right Knee w/o Contrast ; Reason for Exam: Dx, medial meniscus tear This document is confidential medical information. Unauthorized disclosure or use of this information is prohibited by law. If you are not the intended recipient of this document, please advise us by calling immediately [phone]. [employer] [place] [address] [address] Patient Name: Amy Miguel Case ID: [id number] Patient : 1955 Referring Physician: Fransico Manriquez MD Exam Date: 2021 Exam Description: MR Right Knee w/o Contrast HISTORY:  Medial pain. TECHNICAL FACTORS:  Long- and short-axis fat- and water-weighted images were performed. COMPARISON:  None. FINDINGS:  Intact extensor mechanism. Laterally tilted patella. Cartilage fibrillation and fraying medial patella. Intermediate grade chondromalacia medial trochlea. Trizonal tear posterior horn and body medial meniscus. Portion of the free edge flipped into the medial gutter. Medial capsular swelling. MCL intact. Medial compartment arthropathy. Intermediate grade chondromalacia. Mild marrow reaction medial tibia.  Discoid variant lateral meniscus. LCL intact. ACL and PCL are intact. Complex effusion. Debris and small loose bodies. No soft tissue mass. CONCLUSION: 1. Complex tear posterior horn and body medial meniscus. Portion of the free edge flipped into the medial gutter. Medial capsular swelling. 2. Discoid lateral meniscus. 3. Patellofemoral arthropathy. Intermediate grade chondromalacia. Capsulosynovitis. Debris and small loose bodies. 4. Please see above. Thank you for the opportunity to provide your interpretation. Aixa Hubbard MD A: SOURAV/pernell 08/13/2021 2:07 PM T: PERNELL 08/13/2021 2:00 PM              Assessment   Impression: . Encounter Diagnoses   Name Primary?  Complex tear of medial meniscus of right knee as current injury, subsequent encounter Yes    Discoid lateral meniscus of right knee         Complex tear with unstable component      Plan:     Considering the candidate for knee arthroscopy and partial meniscectomy-surgical consultation Dr. Kelsey Marrufo. Continue meloxicam and functional knee bracing for now  Activity modification meniscal protocol           Orders:      No orders of the defined types were placed in this encounter. Deepika Parham MD.      Disclaimer: \"This note was dictated with voice recognition software. Though review and correction are routine, we apologize for any errors. \"

## 2021-08-18 NOTE — LETTER
[address]  [address] [facility] [address]  Phone: [phone]  Fax: [phone]           Kirti Hawk MD      August 18, 2021     Patient: Jose Alejandro Lehman   MR Number: [id number]   YOB: 1955   Date of Visit: 8/18/2021           Impression: . Encounter Diagnoses   Name Primary?  Complex tear of medial meniscus of right knee as current injury, subsequent encounter Yes    Discoid lateral meniscus of right knee         Complex tear with unstable component      Plan:     Considering the candidate for knee arthroscopy and partial meniscectomy-surgical consultation Dr. Mario Roper. Continue meloxicam and functional knee bracing for now  Activity modification meniscal protocol           Orders:      No orders of the defined types were placed in this encounter. Chuck Turner MD.      Disclaimer: \"This note was dictated with voice recognition software. Though review and correction are routine, we apologize for any errors. \"       If you have questions, please do not hesitate to call me. I look forward to following Simon Lai along with you.     Sincerely,    MD Kirti Louis MD    CC providers:  Mario Roper MD  [address] [address]  Via In Basket

## 2021-08-25 ENCOUNTER — OFFICE VISIT (OUTPATIENT)
Dept: ORTHOPEDIC SURGERY | Age: 66
End: 2021-08-25
Payer: MEDICARE

## 2021-08-25 VITALS — RESPIRATION RATE: 12 BRPM | BODY MASS INDEX: 32.21 KG/M2 | HEIGHT: 70 IN | WEIGHT: 225 LBS

## 2021-08-25 DIAGNOSIS — S83.231A COMPLEX TEAR OF MEDIAL MENISCUS OF RIGHT KNEE AS CURRENT INJURY, INITIAL ENCOUNTER: Primary | ICD-10-CM

## 2021-08-25 PROCEDURE — 99215 OFFICE O/P EST HI 40 MIN: CPT | Performed by: ORTHOPAEDIC SURGERY

## 2021-08-25 NOTE — LETTER
SURGERY SCHEDULING SHEET         Account: [id number]  Patient: Ingrid Yun    : 1955  Address:  [address] [place], [place] [address] [address]    Diagnosis:     ICD-10-CM    1.  Complex tear of medial meniscus of right knee as current injury, initial encounter  S83.231A        Date of Surgery: 21  Time of Surgery: 1:00 PM  Facility: [place] [address]  Surgeon Name: Cathy Maria   Assisting: tbd   Procedure: RIGHT knee arthroscopy, synovectomy, partial medial meniscectomy, possible chondroplasty  CPT Code: 65406, 38665  Insurance: [employer] [facility] ADVANTAGE  Length of Procedure: 60 min MINUTES  Special Equipment:  Standard Knee Arthroscopy Equipment   Labral Reconstruction: []Fascia Leyda Allograft  []Graft prep stand  GONZALO: []Cortical allograft ilium  Allograft: []Achilles tendon  []anterior tibialis  []Posterior tibialis  Position:  []Supine  []Lateral  []Beach-chair  []Prone    OR Bed:  []Regular  []Marienthal  []Max  []Hip hays  []Beach-chair  []Spyder  Radiology:  []Large C-arm  []Small C-arm  []Portable X-ray    Anesthesia:   [] General [] Block  [] MAC [x] Local [] Spinal [] Choice  Endo Sedation:    [x] None [] Topical [] Other:     Patient Status:    [x] SDS (OP) [] AMA [] 23 HR OBS [] OIB  [] INPT (RM#)  PCP: David Moran (Inactive)  Blood Thinner: Yes   Allergies: No Known Allergies  Pre op H&P Yes   Covid testing required: No   Covid Vaccinated:  Yes    IMPLEMENT ATTACHED CLINICAL ORDERS FOR THIS PATIENT  MD SIGNATURE:     Cathy Maria MD Today's Date: 21 time: 3:59 pm  Phone:  [phone]  Fax: [phone]  [facility]: [phone] FX: [phone] [facility] FX: [phone]      [address]: [phone] FX: [phone]

## 2021-08-25 NOTE — PROGRESS NOTES
Chief Complaint    Knee Pain (NP, right knee pain. Pt notes falling in July 2021 due to his Parkinson's Disease. Reports h/o of right knee stiffness but had never injured knee until 1 month ago)      History of Present Illness:  Elba Hall is a 61 y.o. male who presents for right knee pain related to medial meniscus tear that he sustained about 6 to 8 weeks ago. He was referred to me by my colleague Dr. Maryana Siddiqui. . Patient describes their pain as 3/10, dull, achy, sharp, worse with twisting movements and prolonged walking. He is an avid walker and likes to walk. He has discomfort with twisting type movements. He believes he had a twisting landing awkwardly type incident on July 12, 2021. He was ultimately seen by my colleague and underwent an MRI. He does have a tremor due to Parkinson's disease. He has had that for the past 10 years. He also has mild diabetic polyneuropathy involving the right foot but no venous stasis and no ulcerations. He has a hemoglobin A1c of 6.2. This is non-insulin-dependent. He lives with his wife in a single level. The patient denies any numbness, paresthesias, or weakness. Pain Assessment  Location of Pain: Knee  Location Modifiers: Right  Severity of Pain: 3  Quality of Pain: Sharp, Dull  Duration of Pain: Persistent  Frequency of Pain: Intermittent  Aggravating Factors: Other (Comment), Walking, Bending (Turning;  Movement of knee)  Limiting Behavior: Yes  Relieving Factors: Rest, Ice  Result of Injury: Yes  Work-Related Injury: No  Are there other pain locations you wish to document?: No    Past Medical History:   Diagnosis Date    Bilateral swelling of feet     Generalized headaches     Goiter     Hemorrhoids     Hiatal hernia     Hyperlipidemia     Impaired fasting glucose     Neuropathy     Other and unspecified anterior pituitary hyperfunction     Other anterior pituitary disorders     Other testicular hypofunction     Other testicular hypofunction     Parkinson's disease     Reflux     Sarcoidosis         Past Surgical History:   Procedure Laterality Date    BRAIN SURGERY      brainstem    CATARACT REMOVAL  12-10-14    COLONOSCOPY  ca 2009    no polyps.  EXTERNAL EAR SURGERY      EYE SURGERY      FOOT SURGERY      SKIN BIOPSY      SKIN BIOPSY      TONSILLECTOMY      UMBILICAL HERNIA REPAIR  3/08/06    umbillical hernia repair with mesh       Family History   Problem Relation Age of Onset    Diabetes Mother     Thyroid Disease Paternal Aunt     Cancer Paternal Aunt     Diabetes Sister     Thyroid Disease Sister     Stroke Brother     Diabetes Maternal Aunt     Diabetes Maternal Uncle     Diabetes Maternal Grandmother     Diabetes Maternal Grandfather     High Blood Pressure Neg Hx     Heart Disease Neg Hx        Social History     Socioeconomic History    Marital status:      Spouse name: None    Number of children: 2    Years of education: None    Highest education level: None   Occupational History    Occupation: retired    Tobacco Use    Smoking status: Never Smoker    Smokeless tobacco: Never Used   Vaping Use    Vaping Use: Never used   Substance and Sexual Activity    Alcohol use: No    Drug use: No    Sexual activity: Yes     Partners: Female   Other Topics Concern    None   Social History Narrative    None     Social Determinants of Health     Financial Resource Strain:     Difficulty of Paying Living Expenses:    Food Insecurity:     Worried About Running Out of Food in the Last Year:     Ran Out of Food in the Last Year:    Transportation Needs:     Lack of Transportation (Medical):      Lack of Transportation (Non-Medical):    Physical Activity:     Days of Exercise per Week:     Minutes of Exercise per Session:    Stress:     Feeling of Stress :    Social Connections:     Frequency of Communication with Friends and Family:     Frequency of Social Gatherings with Friends and Family:     Attends [employer] Services:     Active Member of Clubs or Organizations:     Attends Club or Organization Meetings:     Marital Status:    Intimate Partner Violence:     Fear of Current or Ex-Partner:     Emotionally Abused:     Physically Abused:     Sexually Abused:        Current Outpatient Medications   Medication Sig Dispense Refill    fenofibrate (TRIGLIDE) 160 MG tablet TAKE 1 TABLET BY MOUTH EVERY DAY 90 tablet 0    meloxicam (MOBIC) 15 MG tablet Take 1 tablet by mouth daily 30 tablet 2    Icosapent Ethyl (VASCEPA) 1 g CAPS capsule TAKE 2 CAPSULES BY MOUTH TWO TIMES A  capsule 0    rosuvastatin (CRESTOR) 20 MG tablet TAKE 1 TABLET BY MOUTH ONE TIME A DAY  90 tablet 0    SITagliptin (JANUVIA) 100 MG tablet TAKE 1 TABLET BY MOUTH ONE TIME DAILY 90 tablet 0    empagliflozin (JARDIANCE) 25 MG tablet TAKE 1 TABLET BY MOUTH EVERY DAY 90 tablet 0    lisinopril (PRINIVIL;ZESTRIL) 10 MG tablet TAKE 1 TABLET BY MOUTH ONE TIME A DAY  90 tablet 0    metFORMIN (GLUCOPHAGE) 1000 MG tablet TAKE 1 TABLET BY MOUTH TWO TIMES A DAY WITH MEALS 180 tablet 0    Lancets (ONETOUCH DELICA PLUS [id number]) MISC USE TO TEST BLOOD SUGAR FOUR TIMES A  each 0    Cholecalciferol (VITAMIN D3) 125 MCG (5000 UT) TABS TAKE 1 TABLET BY MOUTH FOUR DAYS A WEEK AND TAKE 2 TABLETS BY MOUTH 3 DAYS A WEEK 150 tablet 0    hydroxychloroquine (PLAQUENIL) 200 MG tablet TAKE 2 TABLETS BY MOUTH ONE TIME A DAY with breakfast      testosterone cypionate (DEPOTESTOTERONE CYPIONATE) 200 MG/ML injection INJECT 1 ML INTO THE MUSCLE EVERY 14 DAYS 10 mL 1    ONE TOUCH ULTRA TEST strip 4 times daily.  400 strip 3    Syringe, Disposable, 3 ML MISC Once every 2 weeks 100 each 3    Lancets (ONETOUCH DELICA PLUS [id number]) MISC use to test blood sugar 4 times daily 100 each 0    BREO ELLIPTA 200-25 MCG/INH AEPB INHALE 1 PUFF BY MOUTH ONE TIME A DAY  11    Blood Glucose Monitoring Suppl (ONE TOUCH ULTRA 2) w/Device KIT To check blood glucose 4 times daily. 1 kit 0    FREESTYLE LANCETS MISC 1 Units by Does not apply route 4 times daily 400 each 5    Blood Glucose Monitoring Suppl (FREESTYLE LITE) LIDIA 1 Device by Does not apply route 4 times daily 1 Device 0    propranolol (INDERAL) 80 MG tablet Take 80 mg by mouth daily       Probiotic Product (PROBIOTIC-10 PO) Take 10 mg by mouth daily       Cinnamon Bark POWD 500 mg by Does not apply route daily       carbidopa-levodopa (SINEMET)  MG per tablet Take 2 tablets by mouth 4 times daily 1.5 tablets       aspirin 81 MG EC tablet Take 81 mg by mouth daily. Last dose 3/9/15       No current facility-administered medications for this visit. No Known Allergies      Review of Systems:  A 14 point review of systems available in the scanned medical record as documented by the patient. Today's review pertinent items are noted in HPI. Vital Signs:   Resp 12   Ht 5' 10\" (1.778 m)   Wt 225 lb (102.1 kg)   BMI 32.28 kg/m²     General Exam:    Neuro: Alert & Oriented x 3,  normal,  no focal deficits noted. Normal mood & affect. Eyes: Sclera clear  Ears: Normal external ear  Mouth:  No perioral lesions  Pulm: Respirations unlabored and regular   Skin: Warm, well perfused      Knee Examination:RIGHT    Skin:  There are no skin lesions, cellulitis, or extreme edema in the lower extremities. Sensation is grossly intact to light touch bilaterally lower extremity. The patient has warm and well-perfused Bilateral     Inspection:  trace effusion, no ecchymosis, discoloration, erythema, excessive warmth. no gross deformities, no signs of infection. Palpation: There is mild patellofemoral crepitus, there is severe mid-medial and postero-medial joint line tenderness. No other osseous or soft tissue tenderness around the knee.   Negative calf tenderness    Range of Motion:  Patellar mobility is normal and moves 1 quadrant in both the medial and lateral directions. Flexion arc is  5-125 degrees with pain     Strength:  5/5 quadriceps, 5/5 hamstrings    Special Tests:   No ligament instability, valgus stress test and MCL stable at 0 and 30°, varus stress test and LCL are stable at 0 and 30°. Lachman's exhibits a solid endpoint. Pivot shift negative. Negative posterior drawer. Dial Test Negative, Dorothea's test Positive. Negative Homans sign  Q-angle normal    Gait:  Steady, Non antalgic, without the use of assistive devices    Alignment: Neutral alignment     Neuro: Sensation equal bilateral lower extremities    Vascular: 2+ posterior tibialis pulse      Radiology:     4 View X-rays (AP Standing, 45deg PA weight-bearing, lateral, and merchant views) of both knees were obtained and reviewed in office. Impression: mild medial compartment narrowing    MRI Right knee 8/13/2021:  CONCLUSION:   1. Complex tear posterior horn and body medial meniscus. Portion of the free edge flipped into    the medial gutter. Medial capsular swelling. 2. Discoid lateral meniscus. 3. Patellofemoral arthropathy. Intermediate grade chondromalacia. Capsulosynovitis. Debris and    small loose bodies. 4. Please see above. Assessment: Patient is a 61 y.o. male right knee pain and discomfort and mechanical symptoms related to an MRI diagnosis of a complex tear posterior horn of the medial meniscus. Part of it is flipped into the medial gutter. Impression:  Visit Diagnoses       Codes    Complex tear of medial meniscus of right knee as current injury, initial encounter    -  Primary A28.255S          Office Procedures:  No orders of the defined types were placed in this encounter. No orders of the defined types were placed in this encounter. Plan:  Pertinent imaging was reviewed. The etiology, natural history, and treatment options for the disorder were discussed.   The roles of activity medication, antiinflammatories, injections, bracing, physical therapy, and surgical interventions were all described to the patient and questions were answered. We believe patient is a candidate for surgical management with right knee arthroscopy, partial medial meniscectomy, possible chondroplasty. Risks, benefits, advantages, disadvantages and potential complications as well as the anticipated postoperative course were discussed. He understands at this outpatient surgery. However he still understands there is always a risk of  infection, stiffness, the possibility that surgery may not complete take his pain away, the need for extensive rehab involved, and that he may still have some pain and swelling up to 2 to 3 months after surgery. He also understands the risk of any general anesthetic given Parkinson's disease and or in general which include prolonged intubation, heart attack, stroke, and death, which are almost unheard of but not 0%. The patient agreed to pursue this treatment option. All questions were addressed to their satisfaction. We will have him undergo standard preoperative testing and see him at our office before surgery to discuss perioperative process and have him fitted with crutches and given the appropriate medicine for surgery. All the patient's questions were answered while in the clinic. The patient is understanding of all instructions and agrees with the plan. Approximately 45 minutes was spent on patient education and coordinating care. Follow up in: No follow-ups on file. Sincerely,    Marah Bowden MD [facility]   [address] [place], [address]  Email: [email or web address]  Office: [phone]    08/25/21  3:53 PM            The encounter with Raudel Mittal was carried out by myself, Dr Isabel Saleh, who personally examined the patient and reviewed the plan.       This dictation was performed with a verbal recognition program (DRAGON) and it was checked for errors. It is possible that there are still dictated errors within this office note. If so, please bring any errors to my attention for an addendum. All efforts were made to ensure that this office note is accurate.

## 2021-09-01 RX ORDER — LISINOPRIL 10 MG/1
TABLET ORAL
Qty: 90 TABLET | Refills: 0 | Status: SHIPPED | OUTPATIENT
Start: 2021-09-01 | End: 2021-11-30

## 2021-09-01 NOTE — TELEPHONE ENCOUNTER
Requested Prescriptions     Pending Prescriptions Disp Refills    lisinopril (PRINIVIL;ZESTRIL) 10 MG tablet [Pharmacy Med Name: Lisinopril Oral Tablet 10 MG] 90 tablet 0     Sig: TAKE 1 TABLET BY MOUTH EVERY DAY     LAST OV 5/28/21  NEXT OV 12/26/21  LAST REFILL 6/2/2021  RECENT LAB 5/20/21

## 2021-09-03 ENCOUNTER — TELEPHONE (OUTPATIENT)
Dept: ORTHOPEDIC SURGERY | Age: 66
End: 2021-09-03

## 2021-09-05 DIAGNOSIS — E11.40 TYPE 2 DIABETES, CONTROLLED, WITH NEUROPATHY (HCC): ICD-10-CM

## 2021-09-07 NOTE — TELEPHONE ENCOUNTER
Requested Prescriptions     Pending Prescriptions Disp Refills    SITagliptin (JANUVIA) 100 MG tablet [Pharmacy Med Name: Januvia Oral Tablet 100 MG] 90 tablet 0     Sig: TAKE 1 TABLET BY MOUTH EVERY DAY     LAST OV 5/28/21  NEXT OV 12/6/21  LAST REFILL 6/4/2021  RECENT LAB 5/20/21

## 2021-09-12 DIAGNOSIS — E11.40 TYPE 2 DIABETES, CONTROLLED, WITH NEUROPATHY (HCC): ICD-10-CM

## 2021-09-17 ENCOUNTER — TELEPHONE (OUTPATIENT)
Dept: ORTHOPEDIC SURGERY | Age: 66
End: 2021-09-17

## 2021-09-17 RX ORDER — EMPAGLIFLOZIN 25 MG/1
TABLET, FILM COATED ORAL
Qty: 90 TABLET | Refills: 0 | Status: SHIPPED | OUTPATIENT
Start: 2021-09-17 | End: 2021-12-27

## 2021-09-17 NOTE — TELEPHONE ENCOUNTER
Requested Prescriptions     Pending Prescriptions Disp Refills    JARDIANCE 25 MG tablet [Pharmacy Med Name: Jardiance Oral Tablet 25 MG] 90 tablet 0     Sig: TAKE 1 TABLET BY MOUTH EVERY DAY     Last OV 5/28/21  Next OV 12/6/21

## 2021-09-20 ENCOUNTER — HOSPITAL ENCOUNTER (OUTPATIENT)
Dept: PHYSICAL THERAPY | Age: 66
Setting detail: THERAPIES SERIES
Discharge: HOME | End: 2021-09-20
Payer: MEDICARE

## 2021-09-20 DIAGNOSIS — S83.231A COMPLEX TEAR OF MEDIAL MENISCUS OF RIGHT KNEE AS CURRENT INJURY, INITIAL ENCOUNTER: Primary | ICD-10-CM

## 2021-09-20 DIAGNOSIS — Q68.6 DISCOID LATERAL MENISCUS OF RIGHT KNEE: ICD-10-CM

## 2021-09-20 PROCEDURE — 97162 PT EVAL MOD COMPLEX 30 MIN: CPT

## 2021-09-20 PROCEDURE — 97530 THERAPEUTIC ACTIVITIES: CPT

## 2021-09-20 NOTE — FLOWSHEET NOTE
[facility]    Physical Therapy Treatment Note/ Progress Report:     Date:  2021    Patient Name:  Jean-Claude Wood    :  1955  MRN: [id number]  Medical/Treatment Diagnosis Information:  · Diagnosis: F39.013X (ICD-10-CM) - Complex tear of medial meniscus of right knee as current injury, initial [id number] (ICD-10-CM) - Discoid lateral meniscus of right knee  · Treatment Diagnosis: Pain and functional limiitations of R knee  Insurance/Certification information:  PT Insurance Information: [employer] or [facility]  Physician Information:  Referring Practitioner: Jenny Hubbard MD  Plan of care signed (Y/N):     Date of Patient follow up with Physician:      Progress Report: []  Yes  []  No     Functional Scale: LEFS 35/80   Date: 21    Date Range for reporting period:  Beginnin21  Ending:      Progress report due (10 Rx/or 30 days whichever is less):      Recertification due (POC duration/ or 90 days whichever is less): 21     Visit # Insurance Allowable Auth Needed   1 40 []Yes    []No     Pain level:  1-4/10     SUBJECTIVE:  See eval    OBJECTIVE: See eval   Observation:    Test measurements:      RESTRICTIONS/PRECAUTIONS: DOS 21 RIGHT KNEE ARTHROSCOPY, SYNOVECTOMY, PARTIAL MEDIAL MENISCECTOMY, POSSIBLE CHONDROPLASTY    Exercises/Interventions:   Therapeutic Ex Wt / Resistance Sets/sec Reps Notes                                                                                                        Therapeutic Activities 10'               Education on fall prevention, surgical expectations post-operatively, and gait training during session.                                                            Manual Intervention       Shld /GH Mobs       Post Cap mobs       Thoracic/Rib manipulation       CT MT/Mobs       PROM MT                     NMR re-education Therapeutic Exercise and NMR EXR  [x] (97268) Provided verbal/tactile cueing for activities related to strengthening, flexibility, endurance, ROM  for improvements in scapular, scapulothoracic and UE control with self care, reaching, carrying, lifting, house/yardwork, driving/computer work.    [] (64203) Provided verbal/tactile cueing for activities related to improving balance, coordination, kinesthetic sense, posture, motor skill, proprioception  to assist with  scapular, scapulothoracic and UE control with self care, reaching, carrying, lifting, house/yardwork, driving/computer work. Therapeutic Activities:    [x] (56615 or 53413) Provided verbal/tactile cueing for activities related to improving balance, coordination, kinesthetic sense, posture, motor skill, proprioception and motor activation to allow for proper function of scapular, scapulothoracic and UE control with self care, carrying, lifting, driving/computer work.      Home Exercise Program:    [x] (95302) Reviewed/Progressed HEP activities related to strengthening, flexibility, endurance, ROM of scapular, scapulothoracic and UE control with self care, reaching, carrying, lifting, house/yardwork, driving/computer work  [] (76377) Reviewed/Progressed HEP activities related to improving balance, coordination, kinesthetic sense, posture, motor skill, proprioception of scapular, scapulothoracic and UE control with self care, reaching, carrying, lifting, house/yardwork, driving/computer work      Manual Treatments:  PROM / STM / Oscillations-Mobs:  G-I, II, III, IV (PA's, Inf., Post.)  [x] (89501) Provided manual therapy to mobilize soft tissue/joints of cervical/CT, scapular GHJ and UE for the purpose of modulating pain, promoting relaxation,  increasing ROM, reducing/eliminating soft tissue swelling/inflammation/restriction, improving soft tissue extensibility and allowing for proper ROM for normal function with self care, reaching, carrying, lifting, house/yardwork, driving/computer work    Modalities:      Charges:  Timed Code Treatment Minutes: 10'   Total Treatment Minutes: 45       [] EVAL (LOW) 83329 (typically 20 minutes face-to-face)  [x] EVAL (MOD) 53827 (typically 30 minutes face-to-face)  [] EVAL (HIGH) 46080 (typically 45 minutes face-to-face)  [] RE-EVAL     [] XW(28877) x     [] IONTO (77706)  [] NMR (49274) x     [] VASO (76061)  [] Manual (51198) x     [] Other:  [x] TA (37998)x    1 [] Mech Traction (27371)  [] ES(attended) (14886)     [] ES (un) (13793): If BWC Please Indicate Time In/Out and Total Minutes  CPT Code Time in Time out Total Min                                            GOALS:  Patient stated goal: To return to ambulation for exercise. []? Progressing: []? Met: []? Not Met: []? Adjusted     Therapist goals for Patient:   Short Term Goals: To be achieved in: 2 weeks  1. Independent in HEP and progression per patient tolerance, in order to prevent re-injury. []? Progressing: []? Met: []? Not Met: []? Adjusted  2. Patient will have a decrease in pain to facilitate improvement in movement, function, and ADLs as indicated by Functional Deficits. []? Progressing: []? Met: []? Not Met: []? Adjusted     Long Term Goals: To be achieved in: 8-10 weeks  1. Disability index score of 35% or less for the LEFS to assist with reaching prior level of function. []? Progressing: []? Met: []? Not Met: []? Adjusted  2. Patient will demonstrate increased AROM to [facility] to allow for proper joint functioning as indicated by patients Functional Deficits. []? Progressing: []? Met: []? Not Met: []? Adjusted  3. Patient will demonstrate an increase in Strength to good proximal hip strength and control, within 5lb HHD in LE to allow for proper functional mobility as indicated by patients Functional Deficits. []? Progressing: []? Met: []? Not Met: []? Adjusted  4.  Patient will return to pivoting, stair ascend/decend, prolonged walking, prolonged standing, squatting, and functional activities without increased symptoms or restriction. []? Progressing: []? Met: []? Not Met: []? Adjusted  5. To be able to walk at least 3 miles for exercise, weight loss and QOL. (patient specific functional goal)    []? Progressing: []? Met: []? Not Met: []? Adjusted   Progression Towards Functional goals:  [] Patient is progressing as expected towards functional goals listed. [] Progression is slowed due to complexities listed. [] Progression has been slowed due to co-morbidities. [x] Plan just implemented, too soon to assess goals progression  [] Other:     ASSESSMENT:  See eval    Return to Play: (if applicable)   []  Stage 1: Intro to Strength   []  Stage 2: Dynamic Strength and Intro to Plyometrics   []  Stage 3: Advanced Plyometrics and Intro to Throwing   []  Stage 4: Sport specific Training/Return to Sport     []  Ready to Return to Play, [employer]   []  Not Ready for Return to Sports   Comments:      Treatment/Activity Tolerance:  [x] Patient tolerated treatment well [] Patient limited by fatique  [] Patient limited by pain  [] Patient limited by other medical complications  [] Other:     Overall Progression Towards Functional goals/ Treatment Progress Update:  [] Patient is progressing as expected towards functional goals listed. [] Progression is slowed due to complexities/Impairments listed. [] Progression has been slowed due to co-morbidities.   [x] Plan just implemented, too soon to assess goals progression <30days   [] Goals require adjustment due to lack of progress  [] Patient is not progressing as expected and requires additional follow up with physician  [] Other    Prognosis for POC: [x] Good [] Fair  [] Poor    Patient requires continued skilled intervention: [x] Yes  [] No      PLAN: See eval  [] Continue per plan of care [] Alter current plan (see comments)  [x] Plan of care initiated [] Hold pending MD visit [] Discharge    Electronically signed by: Lizzette Vang PT     Note: If patient does not return for scheduled/recommended follow up visits, this note will serve as a discharge from care along with the most recent update on progress.

## 2021-09-20 NOTE — PLAN OF CARE
for Healthcare:   [x]English       []other:    SUBJECTIVE: Patient stated complaint: Patient reports that this July he fell and during the fall he twisted his R knee and hit his head. Patient reports pain and dysfunction continued to get worse in the R knee. Patient reports this is currently stopping him from walking for exercise. Patient reports he is also unable to lift weights at this time due to pain in R knee. Patient reports twisting/pivoting motions and squatting are the worse activities for him at this time. Patient reports balance is limited due to PD. Patient is currently using a single point cane for balance and pain. Patient was using single point cane and walker prior to fall. Patient uses walker at times depending on how PD is presenting that day. Relevant Medical History: PD, neuropathy   Functional Disability Index: LEFS: 35/80    Pain Scale: 1-4/10  Easing factors: Ice and rest   Provocative factors: pivoting, stairs, walking, standing, squatting. Type: [x]Constant   []Intermittent  []Radiating [x]Localized []other:     Numbness/Tingling: Patient reports he has numbness that can be bilateral in the feet, but mostly occurs in the R foot. Occupation/School: Retired [employer]      Living Status/Prior Level of Function: Independent with ADLs and IADLs, 4-5 miles of walking a day.      Patient in [address] [place] [address]?: [] Yes [] No    Functional Testing  Sx Date  Prehab Date 9/20/21 Post-op Re-Eval Date  4 week F/U date  8 week F/U date  D/C Date       TUG (sec) 38 (LOB) needed PT intervention to maintain balance        30 second sit to stand (reps) NT       6 minute walk (m) NT          Balance:    Narrowed EBONY (sec) 20 sec       Semi Tandem (sec) Not able to perform        Tandem (sec) Not able to perform       SLS (sec) Not able to perform          Knee AROM L R L R L R L R L R   Flexion 130 85           Extension 0 -8              Knee Ext: L R L R L R L R L R   MMT (of 5)             Knee Ext (#) 47.0 intervention  []Osteoporosis (M81.8)  []Osteopenia (M85.8)   Endocrine conditions   []Hypothyroid (E03.9)  []Hyperthyroid Gastrointestinal conditions   []Constipation (J55.56)   Metabolic conditions   []Morbid obesity (E66.01)  [x]Diabetes type 1(E10.65) or 2 (E11.65)   []Neuropathy (G60.9)     Pulmonary conditions   []Asthma (J45)  []Coughing   []COPD (J44.9)   Psychological Disorders  []Anxiety (F41.9)  []Depression (F32.9)   []Other:   [x]Other:   PD        Barriers to/and or personal factors that will affect rehab potential:              []Age  []Sex              []Motivation/Lack of Motivation                        [x]Co-Morbidities              []Cognitive Function, education/learning barriers              []Environmental, home barriers              []profession/work barriers  []past PT/medical experience  []other:  Justification: Patient presenting with history of PD places him at significant risk for falls which could affect gait and balance post-operatively. Falls Risk Assessment (30 days):   [] Falls Risk assessed and no intervention required. [x] Falls Risk assessed and Patient requires intervention due to being higher risk   TUG score (>12s at risk): 38 sec. [x] Falls education provided, including gait training, fall prevention education, assistive device education. ASSESSMENT: Patient is a 61year old male who presents pre-operative DOS 9/24/21 RIGHT KNEE ARTHROSCOPY, SYNOVECTOMY, PARTIAL MEDIAL MENISCECTOMY, POSSIBLE CHONDROPLASTY. Patient presents with reduced proximal and quadriceps strength as well as reduced balance during session. Patient educated on fall prevention, surgical expectations post-operatively, and gait training during session.      Functional Impairments:     [x]Noted lumbar/proximal hip/LE joint hypomobility   [x]Decreased LE functional ROM   [x]Decreased core/proximal hip strength and neuromuscular control   [x]Decreased LE functional strength   [x]Reduced balance/proprioceptive control   []other:      Functional Activity Limitations (from functional questionnaire and intake)   [x]Reduced ability to tolerate prolonged functional positions   [x]Reduced ability or difficulty with changes of positions or transfers between positions   [x]Reduced ability to maintain good posture and demonstrate good body mechanics with sitting, bending, and lifting   [x]Reduced ability to sleep   [] Reduced ability or tolerance with driving and/or computer work   [x]Reduced ability to perform lifting, carrying tasks   [x]Reduced ability to squat   []Reduced ability to forward bend   [x]Reduced ability to ambulate prolonged functional periods/distances/surfaces   [x]Reduced ability to ascend/descend stairs   [x]Reduced ability to run, hop, cut or jump   []other:    Participation Restrictions   [x]Reduced participation in self care activities   [x]Reduced participation in home management activities   [x]Reduced participation in work activities   [x]Reduced participation in social activities. [x]Reduced participation in sport/recreation activities. Classification :    []Signs/symptoms consistent with post-surgical status including decreased ROM, strength and function.    []Signs/symptoms consistent with joint sprain/strain   []Signs/symptoms consistent with patella-femoral syndrome   []Signs/symptoms consistent with knee OA/hip OA   [x]Signs/symptoms consistent with internal derangement of knee/Hip   []Signs/symptoms consistent with functional hip weakness/NMR control      []Signs/symptoms consistent with tendinitis/tendinosis    []signs/symptoms consistent with pathology which may benefit from Dry needling      []other:      Prognosis/Rehab Potential:      []Excellent   [x]Good    []Fair   []Poor    Tolerance of evaluation/treatment:    []Excellent   [x]Good    []Fair   []Poor    Physical Therapy Evaluation Complexity Justification  [x] A history of present problem with:  [] no personal factors and/or comorbidities that impact the plan of care;  []1-2 personal factors and/or comorbidities that impact the plan of care  [x]3 personal factors and/or comorbidities that impact the plan of care  [x] An examination of body systems using standardized tests and measures addressing any of the following: body structures and functions (impairments), activity limitations, and/or participation restrictions;:  [x] a total of 1-2 or more elements   [] a total of 3 or more elements   [] a total of 4 or more elements   [x] A clinical presentation with:  [x] stable and/or uncomplicated characteristics   [] evolving clinical presentation with changing characteristics  [] unstable and unpredictable characteristics;   [x] Clinical decision making of [] low, [x] moderate, [] high complexity using standardized patient assessment instrument and/or measurable assessment of functional outcome. [] EVAL (LOW) 21810 (typically 30 minutes face-to-face)  [x] EVAL (MOD) 64952 (typically 30 minutes face-to-face)  [] EVAL (HIGH) 65688 (typically 45 minutes face-to-face)  [] RE-EVAL     PLAN:   Frequency/Duration:  1-2 days per week for 8-10 Weeks:  Interventions:  [x]  Therapeutic exercise including: strength training, ROM, for Lower extremity and core   [x]  NMR activation and proprioception for LE, Glutes and Core   [x]  Manual therapy as indicated for LE, Hip and spine to include: Dry Needling/IASTM, STM, PROM, Gr I-IV mobilizations, manipulation. [x] Modalities as needed that may include: thermal agents, E-stim, Biofeedback, US, iontophoresis as indicated  [x] Patient education on joint protection, postural re-education, activity modification, progression of HEP. HEP instruction: fall prevention, surgical expectations post-operatively, prognosis, and gait training during session. GOALS:  Patient stated goal: To return to ambulation for exercise.   [] Progressing: [] Met: [] Not Met: [] Adjusted    Therapist goals for Patient:   Short Term Goals: To be achieved in: 2 weeks  1. Independent in HEP and progression per patient tolerance, in order to prevent re-injury. [] Progressing: [] Met: [] Not Met: [] Adjusted  2. Patient will have a decrease in pain to facilitate improvement in movement, function, and ADLs as indicated by Functional Deficits. [] Progressing: [] Met: [] Not Met: [] Adjusted    Long Term Goals: To be achieved in: 8-10 weeks  1. Disability index score of 35% or less for the LEFS to assist with reaching prior level of function. [] Progressing: [] Met: [] Not Met: [] Adjusted  2. Patient will demonstrate increased AROM to [facility] to allow for proper joint functioning as indicated by patients Functional Deficits. [] Progressing: [] Met: [] Not Met: [] Adjusted  3. Patient will demonstrate an increase in Strength to good proximal hip strength and control, within 5lb HHD in LE to allow for proper functional mobility as indicated by patients Functional Deficits. [] Progressing: [] Met: [] Not Met: [] Adjusted  4. Patient will return to pivoting, stair ascend/decend, prolonged walking, prolonged standing, squatting, and functional activities without increased symptoms or restriction. [] Progressing: [] Met: [] Not Met: [] Adjusted  5.  To be able to walk at least 3 miles for exercise, weight loss and QOL. (patient specific functional goal)    [] Progressing: [] Met: [] Not Met: [] Adjusted     Electronically signed by:  Elva Weiss, PT

## 2021-09-21 RX ORDER — TRAMADOL HYDROCHLORIDE 50 MG/1
50 TABLET ORAL EVERY 6 HOURS PRN
Qty: 20 TABLET | Refills: 0 | Status: CANCELLED | OUTPATIENT
Start: 2021-09-21 | End: 2021-09-26

## 2021-09-22 ENCOUNTER — OFFICE VISIT (OUTPATIENT)
Dept: ORTHOPEDIC SURGERY | Age: 66
End: 2021-09-22
Payer: MEDICARE

## 2021-09-22 VITALS — HEIGHT: 70 IN | RESPIRATION RATE: 12 BRPM | BODY MASS INDEX: 32.93 KG/M2 | WEIGHT: 230 LBS

## 2021-09-22 DIAGNOSIS — S83.231D COMPLEX TEAR OF MEDIAL MENISCUS OF RIGHT KNEE AS CURRENT INJURY, SUBSEQUENT ENCOUNTER: Primary | ICD-10-CM

## 2021-09-22 PROCEDURE — MISCCOLD COLD THERAPY UNIT AND PAD: Performed by: ORTHOPAEDIC SURGERY

## 2021-09-22 PROCEDURE — 99214 OFFICE O/P EST MOD 30 MIN: CPT | Performed by: ORTHOPAEDIC SURGERY

## 2021-09-22 RX ORDER — LANSOPRAZOLE 15 MG/1
30 CAPSULE, DELAYED RELEASE ORAL DAILY
Qty: 30 CAPSULE | Refills: 0 | Status: SHIPPED | OUTPATIENT
Start: 2021-09-22 | End: 2021-12-07 | Stop reason: ALTCHOICE

## 2021-09-22 RX ORDER — SENNOSIDES 8.6 MG
1 TABLET ORAL 2 TIMES DAILY
Qty: 14 TABLET | Refills: 0 | Status: SHIPPED | OUTPATIENT
Start: 2021-09-22 | End: 2021-09-29

## 2021-09-22 RX ORDER — ASPIRIN 81 MG/1
81 TABLET ORAL 2 TIMES DAILY
Qty: 28 TABLET | Refills: 0 | Status: SHIPPED | OUTPATIENT
Start: 2021-09-22 | End: 2022-06-16

## 2021-09-22 RX ORDER — OXYCODONE HYDROCHLORIDE AND ACETAMINOPHEN 5; 325 MG/1; MG/1
1 TABLET ORAL EVERY 6 HOURS PRN
Qty: 20 TABLET | Refills: 0 | Status: SHIPPED | OUTPATIENT
Start: 2021-09-22 | End: 2021-09-27

## 2021-09-22 RX ORDER — NAPROXEN 500 MG/1
500 TABLET ORAL 2 TIMES DAILY WITH MEALS
Qty: 28 TABLET | Refills: 0 | Status: SHIPPED | OUTPATIENT
Start: 2021-09-22 | End: 2021-12-07 | Stop reason: ALTCHOICE

## 2021-09-22 NOTE — PROGRESS NOTES
PRE-OP INSTRUCTIONS FOR THE SURGICAL PATIENT YOU ARE UNABLE TO MAKE CONTACT FOR AN INTERVIEW:    All patients having surgery or anesthesia are required to be Covid tested OR to have been vaccinated at least 14 days prior to your procedure. It is very important to return our call to [phone] and notify the staff of your last vaccination date otherwise you will be required to complete Covid PCR test within the 5-6 days prior to surgery & quarantine. The results will need to be faxed to PreAdmission Testing at [phone]. 1. Follow all instructions provided to you from your surgeons office, including your ARRIVAL TIME. 2. Enter the MAIN entrance located on [address] and report to the desk. 3. Bring your insurance & photo ID with you. You may also be asked to pay a co-pay, as you may want to bring a check or credit card with you. 4. Leave all other valuables at home. 5. Arrange for someone to drive you home and be with you for the first 24 hours after discharge. 6. Bring your medication list with you day of surgery with doses and frequency listed (including over the counter medications)  7. You must contact your surgeon for Instructions regarding:              - ALL medication instructions, especially if taking blood thinners, aspirin, or diabetic medication.         -Bariatric patients call surgeon if on diabetic medications as some need to be stopped 1 week preop  - IF  there is a change in your physical condition such as a cold, fever, rash, cuts, sores or any other infection, especially near your surgical site. 8. A Pre-op History and Physical for surgery MUST be completed by your Physician or an Urgent Care within 30 days of your procedure date. Please bring a copy with you on the day of your procedure and along with any other testing performed. 9. DO NOT EAT ANYTHING eight hours prior to arrival for surgery.   May have up to 8 ounces of water 4 hours prior to

## 2021-09-23 ENCOUNTER — ANESTHESIA EVENT (OUTPATIENT)
Dept: OPERATING ROOM | Age: 66
End: 2021-09-23
Payer: MEDICARE

## 2021-09-24 ENCOUNTER — ANESTHESIA (OUTPATIENT)
Dept: OPERATING ROOM | Age: 66
End: 2021-09-24
Payer: MEDICARE

## 2021-09-24 ENCOUNTER — HOSPITAL ENCOUNTER (OUTPATIENT)
Age: 66
Setting detail: OUTPATIENT SURGERY
Discharge: HOME | End: 2021-09-24
Attending: ORTHOPAEDIC SURGERY | Admitting: ORTHOPAEDIC SURGERY
Payer: MEDICARE

## 2021-09-24 VITALS
WEIGHT: 230 LBS | BODY MASS INDEX: 32.93 KG/M2 | OXYGEN SATURATION: 97 % | DIASTOLIC BLOOD PRESSURE: 78 MMHG | SYSTOLIC BLOOD PRESSURE: 122 MMHG | HEART RATE: 64 BPM | RESPIRATION RATE: 14 BRPM | TEMPERATURE: 97.5 F | HEIGHT: 70 IN

## 2021-09-24 VITALS
OXYGEN SATURATION: 98 % | DIASTOLIC BLOOD PRESSURE: 63 MMHG | TEMPERATURE: 96.1 F | SYSTOLIC BLOOD PRESSURE: 103 MMHG | RESPIRATION RATE: 8 BRPM

## 2021-09-24 LAB
GLUCOSE BLD-MCNC: 130 MG/DL (ref 70–99)
PERFORMED ON: ABNORMAL

## 2021-09-24 PROCEDURE — 3600000014 HC SURGERY LEVEL 4 ADDTL 15MIN: Performed by: ORTHOPAEDIC SURGERY

## 2021-09-24 PROCEDURE — 7100000001 HC PACU RECOVERY - ADDTL 15 MIN: Performed by: ORTHOPAEDIC SURGERY

## 2021-09-24 PROCEDURE — 3700000000 HC ANESTHESIA ATTENDED CARE: Performed by: ORTHOPAEDIC SURGERY

## 2021-09-24 PROCEDURE — 6360000002 HC RX W HCPCS: Performed by: NURSE ANESTHETIST, CERTIFIED REGISTERED

## 2021-09-24 PROCEDURE — 7100000011 HC PHASE II RECOVERY - ADDTL 15 MIN: Performed by: ORTHOPAEDIC SURGERY

## 2021-09-24 PROCEDURE — 7100000010 HC PHASE II RECOVERY - FIRST 15 MIN: Performed by: ORTHOPAEDIC SURGERY

## 2021-09-24 PROCEDURE — 2500000003 HC RX 250 WO HCPCS: Performed by: NURSE ANESTHETIST, CERTIFIED REGISTERED

## 2021-09-24 PROCEDURE — 7100000000 HC PACU RECOVERY - FIRST 15 MIN: Performed by: ORTHOPAEDIC SURGERY

## 2021-09-24 PROCEDURE — 2580000003 HC RX 258: Performed by: ANESTHESIOLOGY

## 2021-09-24 PROCEDURE — 6370000000 HC RX 637 (ALT 250 FOR IP): Performed by: ORTHOPAEDIC SURGERY

## 2021-09-24 PROCEDURE — 3700000001 HC ADD 15 MINUTES (ANESTHESIA): Performed by: ORTHOPAEDIC SURGERY

## 2021-09-24 PROCEDURE — 6360000002 HC RX W HCPCS

## 2021-09-24 PROCEDURE — 2720000010 HC SURG SUPPLY STERILE: Performed by: ORTHOPAEDIC SURGERY

## 2021-09-24 PROCEDURE — 3600000004 HC SURGERY LEVEL 4 BASE: Performed by: ORTHOPAEDIC SURGERY

## 2021-09-24 PROCEDURE — 2709999900 HC NON-CHARGEABLE SUPPLY: Performed by: ORTHOPAEDIC SURGERY

## 2021-09-24 PROCEDURE — 2500000003 HC RX 250 WO HCPCS: Performed by: ORTHOPAEDIC SURGERY

## 2021-09-24 RX ORDER — OXYCODONE HYDROCHLORIDE AND ACETAMINOPHEN 5; 325 MG/1; MG/1
1 TABLET ORAL
Status: COMPLETED | OUTPATIENT
Start: 2021-09-24 | End: 2021-09-24

## 2021-09-24 RX ORDER — ENALAPRILAT 2.5 MG/2ML
1.25 INJECTION INTRAVENOUS
Status: DISCONTINUED | OUTPATIENT
Start: 2021-09-24 | End: 2021-09-24 | Stop reason: HOSPADM

## 2021-09-24 RX ORDER — GLYCOPYRROLATE 1 MG/5 ML
SYRINGE (ML) INTRAVENOUS PRN
Status: DISCONTINUED | OUTPATIENT
Start: 2021-09-24 | End: 2021-09-24 | Stop reason: SDUPTHER

## 2021-09-24 RX ORDER — LIDOCAINE HCL/PF 100 MG/5ML
SYRINGE (ML) INJECTION PRN
Status: DISCONTINUED | OUTPATIENT
Start: 2021-09-24 | End: 2021-09-24 | Stop reason: SDUPTHER

## 2021-09-24 RX ORDER — HYDRALAZINE HYDROCHLORIDE 20 MG/ML
5 INJECTION INTRAMUSCULAR; INTRAVENOUS EVERY 5 MIN PRN
Status: DISCONTINUED | OUTPATIENT
Start: 2021-09-24 | End: 2021-09-24 | Stop reason: HOSPADM

## 2021-09-24 RX ORDER — BUPIVACAINE HYDROCHLORIDE AND EPINEPHRINE 5; 5 MG/ML; UG/ML
INJECTION, SOLUTION EPIDURAL; INTRACAUDAL; PERINEURAL PRN
Status: DISCONTINUED | OUTPATIENT
Start: 2021-09-24 | End: 2021-09-24 | Stop reason: ALTCHOICE

## 2021-09-24 RX ORDER — FENTANYL CITRATE 50 UG/ML
25 INJECTION, SOLUTION INTRAMUSCULAR; INTRAVENOUS EVERY 5 MIN PRN
Status: DISCONTINUED | OUTPATIENT
Start: 2021-09-24 | End: 2021-09-24 | Stop reason: HOSPADM

## 2021-09-24 RX ORDER — ONDANSETRON 2 MG/ML
4 INJECTION INTRAMUSCULAR; INTRAVENOUS
Status: DISCONTINUED | OUTPATIENT
Start: 2021-09-24 | End: 2021-09-24 | Stop reason: HOSPADM

## 2021-09-24 RX ORDER — SODIUM CHLORIDE 9 MG/ML
25 INJECTION, SOLUTION INTRAVENOUS PRN
Status: DISCONTINUED | OUTPATIENT
Start: 2021-09-24 | End: 2021-09-24 | Stop reason: HOSPADM

## 2021-09-24 RX ORDER — ONDANSETRON 2 MG/ML
INJECTION INTRAMUSCULAR; INTRAVENOUS
Status: COMPLETED
Start: 2021-09-24 | End: 2021-09-24

## 2021-09-24 RX ORDER — CEFAZOLIN SODIUM 1 G/3ML
INJECTION, POWDER, FOR SOLUTION INTRAMUSCULAR; INTRAVENOUS PRN
Status: DISCONTINUED | OUTPATIENT
Start: 2021-09-24 | End: 2021-09-24 | Stop reason: SDUPTHER

## 2021-09-24 RX ORDER — LABETALOL HYDROCHLORIDE 5 MG/ML
5 INJECTION, SOLUTION INTRAVENOUS EVERY 10 MIN PRN
Status: DISCONTINUED | OUTPATIENT
Start: 2021-09-24 | End: 2021-09-24 | Stop reason: HOSPADM

## 2021-09-24 RX ORDER — EPHEDRINE SULFATE 50 MG/ML
INJECTION INTRAVENOUS PRN
Status: DISCONTINUED | OUTPATIENT
Start: 2021-09-24 | End: 2021-09-24 | Stop reason: SDUPTHER

## 2021-09-24 RX ORDER — ONDANSETRON 2 MG/ML
INJECTION INTRAMUSCULAR; INTRAVENOUS PRN
Status: DISCONTINUED | OUTPATIENT
Start: 2021-09-24 | End: 2021-09-24 | Stop reason: SDUPTHER

## 2021-09-24 RX ORDER — FENTANYL CITRATE 50 UG/ML
50 INJECTION, SOLUTION INTRAMUSCULAR; INTRAVENOUS EVERY 5 MIN PRN
Status: DISCONTINUED | OUTPATIENT
Start: 2021-09-24 | End: 2021-09-24 | Stop reason: HOSPADM

## 2021-09-24 RX ORDER — SODIUM CHLORIDE 0.9 % (FLUSH) 0.9 %
10 SYRINGE (ML) INJECTION EVERY 12 HOURS SCHEDULED
Status: DISCONTINUED | OUTPATIENT
Start: 2021-09-24 | End: 2021-09-24 | Stop reason: HOSPADM

## 2021-09-24 RX ORDER — PROPOFOL 10 MG/ML
INJECTION, EMULSION INTRAVENOUS PRN
Status: DISCONTINUED | OUTPATIENT
Start: 2021-09-24 | End: 2021-09-24 | Stop reason: SDUPTHER

## 2021-09-24 RX ORDER — SODIUM CHLORIDE 0.9 % (FLUSH) 0.9 %
10 SYRINGE (ML) INJECTION PRN
Status: DISCONTINUED | OUTPATIENT
Start: 2021-09-24 | End: 2021-09-24 | Stop reason: HOSPADM

## 2021-09-24 RX ORDER — SUCCINYLCHOLINE/SOD CL,ISO/PF 200MG/10ML
SYRINGE (ML) INTRAVENOUS PRN
Status: DISCONTINUED | OUTPATIENT
Start: 2021-09-24 | End: 2021-09-24 | Stop reason: SDUPTHER

## 2021-09-24 RX ORDER — LIDOCAINE HYDROCHLORIDE 10 MG/ML
1 INJECTION, SOLUTION EPIDURAL; INFILTRATION; INTRACAUDAL; PERINEURAL
Status: DISCONTINUED | OUTPATIENT
Start: 2021-09-24 | End: 2021-09-24 | Stop reason: HOSPADM

## 2021-09-24 RX ORDER — SODIUM CHLORIDE, SODIUM LACTATE, POTASSIUM CHLORIDE, CALCIUM CHLORIDE 600; 310; 30; 20 MG/100ML; MG/100ML; MG/100ML; MG/100ML
INJECTION, SOLUTION INTRAVENOUS CONTINUOUS
Status: DISCONTINUED | OUTPATIENT
Start: 2021-09-24 | End: 2021-09-24 | Stop reason: HOSPADM

## 2021-09-24 RX ORDER — ROPIVACAINE HYDROCHLORIDE 5 MG/ML
INJECTION, SOLUTION EPIDURAL; INFILTRATION; PERINEURAL PRN
Status: DISCONTINUED | OUTPATIENT
Start: 2021-09-24 | End: 2021-09-24 | Stop reason: ALTCHOICE

## 2021-09-24 RX ORDER — ONDANSETRON 2 MG/ML
4 INJECTION INTRAMUSCULAR; INTRAVENOUS ONCE
Status: COMPLETED | OUTPATIENT
Start: 2021-09-24 | End: 2021-09-24

## 2021-09-24 RX ORDER — FENTANYL CITRATE 50 UG/ML
INJECTION, SOLUTION INTRAMUSCULAR; INTRAVENOUS PRN
Status: DISCONTINUED | OUTPATIENT
Start: 2021-09-24 | End: 2021-09-24 | Stop reason: SDUPTHER

## 2021-09-24 RX ADMIN — PHENYLEPHRINE HYDROCHLORIDE 100 MCG: 10 INJECTION, SOLUTION INTRAMUSCULAR; INTRAVENOUS; SUBCUTANEOUS at 11:49

## 2021-09-24 RX ADMIN — SODIUM CHLORIDE, POTASSIUM CHLORIDE, SODIUM LACTATE AND CALCIUM CHLORIDE: 600; 310; 30; 20 INJECTION, SOLUTION INTRAVENOUS at 11:57

## 2021-09-24 RX ADMIN — PROPOFOL 150 MG: 10 INJECTION, EMULSION INTRAVENOUS at 11:08

## 2021-09-24 RX ADMIN — ONDANSETRON 4 MG: 2 INJECTION INTRAMUSCULAR; INTRAVENOUS at 11:40

## 2021-09-24 RX ADMIN — FENTANYL CITRATE 50 MCG: 50 INJECTION, SOLUTION INTRAMUSCULAR; INTRAVENOUS at 11:40

## 2021-09-24 RX ADMIN — PHENYLEPHRINE HYDROCHLORIDE 100 MCG: 10 INJECTION, SOLUTION INTRAMUSCULAR; INTRAVENOUS; SUBCUTANEOUS at 12:18

## 2021-09-24 RX ADMIN — Medication 0.4 MG: at 11:52

## 2021-09-24 RX ADMIN — ONDANSETRON 4 MG: 2 INJECTION INTRAMUSCULAR; INTRAVENOUS at 10:16

## 2021-09-24 RX ADMIN — FENTANYL CITRATE 25 MCG: 50 INJECTION, SOLUTION INTRAMUSCULAR; INTRAVENOUS at 11:25

## 2021-09-24 RX ADMIN — CEFAZOLIN SODIUM 2000 MG: 1 POWDER, FOR SOLUTION INTRAMUSCULAR; INTRAVENOUS at 11:20

## 2021-09-24 RX ADMIN — OXYCODONE HYDROCHLORIDE AND ACETAMINOPHEN 1 TABLET: 5; 325 TABLET ORAL at 13:18

## 2021-09-24 RX ADMIN — Medication 50 MG: at 11:08

## 2021-09-24 RX ADMIN — PROPOFOL 60 MG: 10 INJECTION, EMULSION INTRAVENOUS at 12:06

## 2021-09-24 RX ADMIN — EPHEDRINE SULFATE 5 MG: 50 INJECTION INTRAVENOUS at 11:27

## 2021-09-24 RX ADMIN — Medication 100 MG: at 11:08

## 2021-09-24 RX ADMIN — PHENYLEPHRINE HYDROCHLORIDE 100 MCG: 10 INJECTION, SOLUTION INTRAMUSCULAR; INTRAVENOUS; SUBCUTANEOUS at 11:40

## 2021-09-24 RX ADMIN — FENTANYL CITRATE 25 MCG: 50 INJECTION, SOLUTION INTRAMUSCULAR; INTRAVENOUS at 11:35

## 2021-09-24 RX ADMIN — EPHEDRINE SULFATE 5 MG: 50 INJECTION INTRAVENOUS at 11:22

## 2021-09-24 RX ADMIN — SODIUM CHLORIDE, POTASSIUM CHLORIDE, SODIUM LACTATE AND CALCIUM CHLORIDE: 600; 310; 30; 20 INJECTION, SOLUTION INTRAVENOUS at 10:10

## 2021-09-24 ASSESSMENT — PULMONARY FUNCTION TESTS
PIF_VALUE: 23
PIF_VALUE: 22
PIF_VALUE: 24
PIF_VALUE: 15
PIF_VALUE: 22
PIF_VALUE: 23
PIF_VALUE: 1
PIF_VALUE: 21
PIF_VALUE: 23
PIF_VALUE: 22
PIF_VALUE: 25
PIF_VALUE: 24
PIF_VALUE: 23
PIF_VALUE: 23
PIF_VALUE: 24
PIF_VALUE: 24
PIF_VALUE: 23
PIF_VALUE: 23
PIF_VALUE: 3
PIF_VALUE: 27
PIF_VALUE: 25
PIF_VALUE: 22
PIF_VALUE: 2
PIF_VALUE: 22
PIF_VALUE: 22
PIF_VALUE: 23
PIF_VALUE: 24
PIF_VALUE: 23
PIF_VALUE: 23
PIF_VALUE: 24
PIF_VALUE: 24
PIF_VALUE: 22
PIF_VALUE: 12
PIF_VALUE: 26
PIF_VALUE: 24
PIF_VALUE: 22
PIF_VALUE: 23
PIF_VALUE: 22
PIF_VALUE: 1
PIF_VALUE: 23
PIF_VALUE: 24
PIF_VALUE: 23
PIF_VALUE: 22
PIF_VALUE: 21
PIF_VALUE: 24
PIF_VALUE: 23
PIF_VALUE: 30
PIF_VALUE: 27
PIF_VALUE: 1
PIF_VALUE: 23
PIF_VALUE: 22
PIF_VALUE: 23
PIF_VALUE: 24
PIF_VALUE: 23
PIF_VALUE: 23
PIF_VALUE: 22
PIF_VALUE: 24
PIF_VALUE: 22
PIF_VALUE: 23
PIF_VALUE: 23
PIF_VALUE: 30
PIF_VALUE: 27
PIF_VALUE: 23
PIF_VALUE: 2
PIF_VALUE: 22
PIF_VALUE: 21
PIF_VALUE: 23
PIF_VALUE: 21
PIF_VALUE: 24
PIF_VALUE: 23

## 2021-09-24 ASSESSMENT — PAIN DESCRIPTION - ORIENTATION
ORIENTATION: RIGHT
ORIENTATION: RIGHT

## 2021-09-24 ASSESSMENT — PAIN SCALES - GENERAL
PAINLEVEL_OUTOF10: 4
PAINLEVEL_OUTOF10: 0
PAINLEVEL_OUTOF10: 4

## 2021-09-24 ASSESSMENT — PAIN DESCRIPTION - FREQUENCY: FREQUENCY: CONTINUOUS

## 2021-09-24 ASSESSMENT — PAIN DESCRIPTION - ONSET: ONSET: ON-GOING

## 2021-09-24 ASSESSMENT — PAIN DESCRIPTION - PAIN TYPE: TYPE: ACUTE PAIN;SURGICAL PAIN

## 2021-09-24 ASSESSMENT — PAIN DESCRIPTION - DESCRIPTORS
DESCRIPTORS: ACHING;SORE
DESCRIPTORS: ACHING

## 2021-09-24 ASSESSMENT — PAIN DESCRIPTION - LOCATION
LOCATION: KNEE
LOCATION: KNEE

## 2021-09-24 ASSESSMENT — PAIN - FUNCTIONAL ASSESSMENT: PAIN_FUNCTIONAL_ASSESSMENT: 0-10

## 2021-09-24 NOTE — H&P
Josh Ordoñezer    [id number]    [facility] Same Day Surgery Update H & P  Department of General Surgery   Surgical Service   Pre-operative History and Physical  Last H & P within the last 30 days. DIAGNOSIS:   Complex tear of medial meniscus of right knee as current injury, initial encounter [S83.828Q]    Procedure(s):  RIGHT KNEE ARTHROSCOPY, SYNOVECTOMY, PARTIAL MEDIAL MENISCECTOMY, POSSIBLE CHONDROPLASTY     History obtained from: Patient interview and EHR     HISTORY OF PRESENT ILLNESS:   Patient with right knee pain, swelling and instability in the setting of MRI demonstrated medial meniscus tear. The symptoms have been recalcitrant to conservative treatment and the patient presents today for the above procedure. Covid 19:  Patient denies fever, chills, worsening cough, or known exposure to Covid-19. Past Medical History:        Diagnosis Date    Bilateral swelling of feet     Generalized headaches     Goiter     Hemorrhoids     Hiatal hernia     Hyperlipidemia     Impaired fasting glucose     Neuropathy     Other and unspecified anterior pituitary hyperfunction     Other anterior pituitary disorders     Other testicular hypofunction     Other testicular hypofunction     Parkinson's disease     Reflux     Sarcoidosis      Past Surgical History:        Procedure Laterality Date    BRAIN SURGERY      brainstem    CATARACT REMOVAL  12-10-14    COLONOSCOPY  ca 2009    no polyps.      EXTERNAL EAR SURGERY      EYE SURGERY      FOOT SURGERY      SKIN BIOPSY      SKIN BIOPSY      TONSILLECTOMY      UMBILICAL HERNIA REPAIR  5/10/43    umbillical hernia repair with mesh     Past Social History:  Social History     Socioeconomic History    Marital status:      Spouse name: Not on file    Number of children: 2    Years of education: Not on file    Highest education level: Not on file   Occupational History    Occupation: retired    Tobacco Use    Smoking status: Never Smoker    Smokeless tobacco: Never Used   Vaping Use    Vaping Use: Never used   Substance and Sexual Activity    Alcohol use: No    Drug use: No    Sexual activity: Yes     Partners: Female   Other Topics Concern    Not on file   Social History Narrative    Not on file     Social Determinants of Health     Financial Resource Strain:     Difficulty of Paying Living Expenses:    Food Insecurity:     Worried About Running Out of Food in the Last Year:     [address] in the Last Year:    Transportation Needs:     Lack of Transportation (Medical):  Lack of Transportation (Non-Medical):    Physical Activity:     Days of Exercise per Week:     Minutes of Exercise per Session:    Stress:     Feeling of Stress :    Social Connections:     Frequency of Communication with Friends and Family:     Frequency of Social Gatherings with Friends and Family:     Attends Rastafari Services:     Active Member of Clubs or Organizations:     Attends Club or Organization Meetings:     Marital Status:    Intimate Partner Violence:     Fear of Current or Ex-Partner:     Emotionally Abused:     Physically Abused:     Sexually Abused:          Medications Prior to Admission:      Prior to Admission medications    Medication Sig Start Date End Date Taking? Authorizing Provider   aspirin EC 81 MG EC tablet Take 1 tablet by mouth 2 times daily for 14 days 9/22/21 10/6/21  Esau Bustillo MD   naproxen (NAPROSYN) 500 MG tablet Take 1 tablet by mouth 2 times daily (with meals) for 14 days 9/22/21 10/6/21  Esau Bustillo MD   oxyCODONE-acetaminophen (PERCOCET) 5-325 MG per tablet Take 1 tablet by mouth every 6 hours as needed for Pain for up to 5 days.  9/22/21 9/27/21  Esau Bustillo MD   senna (SENOKOT) 8.6 MG TABS tablet Take 1 tablet by mouth 2 times daily for 7 days 9/22/21 9/29/21  Esau Bustillo MD   lansoprazole (PREVACID) 15 MG delayed release capsule Take 2 capsules by mouth daily for 14 days 9/22/21 10/6/21  Zohra Lenz MD   JARDIANCE 25 MG tablet TAKE 1 TABLET BY MOUTH EVERY DAY  9/17/21   Thanh Dowell MD   SITagliptin (JANUVIA) 100 MG tablet TAKE 1 TABLET BY MOUTH EVERY DAY 9/13/21   Thanh Dowell MD   lisinopril (PRINIVIL;ZESTRIL) 10 MG tablet TAKE 1 TABLET BY MOUTH EVERY DAY 9/1/21   Thanh Dowell MD   fenofibrate (TRIGLIDE) 160 MG tablet TAKE 1 TABLET BY MOUTH EVERY DAY 8/20/21   Thanh Dowell MD   meloxicam NAE MONTES [facility]) 15 MG tablet Take 1 tablet by mouth daily 8/3/21   Anastasia Duke MD   Icosapent Ethyl (VASCEPA) 1 g CAPS capsule TAKE 2 CAPSULES BY MOUTH TWO TIMES A DAY 7/26/21   Thanh Dowell MD   rosuvastatin (CRESTOR) 20 MG tablet TAKE 1 TABLET BY MOUTH ONE TIME A DAY  6/15/21   Thanh Dowell MD   metFORMIN (GLUCOPHAGE) 1000 MG tablet TAKE 1 TABLET BY MOUTH TWO TIMES A DAY WITH MEALS 5/24/21   Thanh Dowell MD   Lancets (ONETOUCH DELICA PLUS [id number]) MISC USE TO TEST BLOOD SUGAR FOUR TIMES A DAY 5/11/21   Thanh Dowell MD   Cholecalciferol (VITAMIN D3) 125 MCG (5000 UT) TABS TAKE 1 TABLET BY MOUTH FOUR DAYS A WEEK AND TAKE 2 TABLETS BY MOUTH 3 DAYS A WEEK 4/15/21   Thanh Dowell MD   hydroxychloroquine (PLAQUENIL) 200 MG tablet TAKE 2 TABLETS BY MOUTH ONE TIME A DAY with breakfast 8/24/20   Historical Provider, MD   testosterone cypionate (DEPOTESTOTERONE CYPIONATE) 200 MG/ML injection INJECT 1 ML INTO THE MUSCLE EVERY 14 DAYS 8/27/20 8/28/21  Thanh Dowell MD   ONE TOUCH ULTRA TEST strip 4 times daily. 5/4/20   Thanh Dowell MD   Syringe, Disposable, 3 ML MISC Once every 2 weeks 5/4/20   Thanh Dowell MD   Lancets (Laretta Emilia PLUS [id number]) MISC use to test blood sugar 4 times daily 11/12/19   Doris Crabtree MD   BREO ELLIPTA 200-25 MCG/INH AEPB INHALE 1 PUFF BY MOUTH ONE TIME A DAY 8/1/19   Historical Provider, MD   Blood Glucose Monitoring Suppl (ONE TOUCH ULTRA 2) w/Device KIT To check blood glucose 4 times daily.  4/17/19   Thanh Dowell, MD   FREESTYLE LANCETS MISC 1 Units by Does not apply route 4 times daily 4/16/19   Reva Mcgovern MD   Blood Glucose Monitoring Suppl (FREESTYLE LITE) LIDIA 1 Device by Does not apply route 4 times daily 4/16/19   Reva Mgcovern MD   propranolol (INDERAL) 80 MG tablet Take 80 mg by mouth daily     Historical Provider, MD   Probiotic Product (PROBIOTIC-10 PO) Take 10 mg by mouth daily     Historical Provider, MD   Cinnamon Bark POWD 500 mg by Does not apply route daily     Historical Provider, MD   carbidopa-levodopa (SINEMET)  MG per tablet Take 2 tablets by mouth 4 times daily 1.5 tablets     Historical Provider, MD         Allergies:  Patient has no known allergies. PHYSICAL EXAM:      /68   Pulse 69   Temp 97.5 °F (36.4 °C) (Oral)   Resp 16   Ht 5' 10\" (1.778 m)   Wt 230 lb (104.3 kg)   SpO2 96%   BMI 33.00 kg/m²      Airway:  Airway patent with no audible stridor    Heart:  Regular rate and rhythm, No murmur noted    Lungs:  No increased work of breathing, good air exchange, clear to auscultation bilaterally, no crackles or wheezing    Abdomen:  Soft, non-distended, non-tender, no masses palpated    ASSESSMENT AND PLAN    Patient is a 61 y.o. male with above specified procedure planned. 1.  The patients history and physical was obtained and signed off by the pre-admission testing department. Patient seen and focused exam done today- no new changes since last physical exam on 9/13/21    2. Access to ancillary services are available per request of the provider.     Janie Greene, HEATHER - CNP     9/24/2021

## 2021-09-24 NOTE — ANESTHESIA PRE PROCEDURE
Department of Anesthesiology  Preprocedure Note       Name:  Baldo Martel   Age:  61 y.o.  :  1955                                          MRN:  [id number]         Date:  2021      Surgeon: Marietta Gómezor):  Magaly Sanchez MD    Procedure: Procedure(s):  RIGHT KNEE ARTHROSCOPY, SYNOVECTOMY, PARTIAL MEDIAL MENISCECTOMY, POSSIBLE CHONDROPLASTY    Medications prior to admission:   Prior to Admission medications    Medication Sig Start Date End Date Taking? Authorizing Provider   carbidopa-levodopa (SINEMET)  MG per tablet Take 2 tablets by mouth 4 times daily 1.5 tablets    Yes Historical Provider, MD   aspirin EC 81 MG EC tablet Take 1 tablet by mouth 2 times daily for 14 days 9/22/21 10/6/21  Magaly Sanchez MD   naproxen (NAPROSYN) 500 MG tablet Take 1 tablet by mouth 2 times daily (with meals) for 14 days 9/22/21 10/6/21  Magaly Sanchez MD   oxyCODONE-acetaminophen (PERCOCET) 5-325 MG per tablet Take 1 tablet by mouth every 6 hours as needed for Pain for up to 5 days.  21  Magaly Sanchez MD   senna (SENOKOT) 8.6 MG TABS tablet Take 1 tablet by mouth 2 times daily for 7 days 21  Magaly Sanchez MD   lansoprazole (PREVACID) 15 MG delayed release capsule Take 2 capsules by mouth daily for 14 days 9/22/21 10/6/21  Magaly Sanchez MD   JARDIANCE 25 MG tablet TAKE 1 TABLET BY MOUTH EVERY DAY  21   Flavia Blanc MD   SITagliptin (JANUVIA) 100 MG tablet TAKE 1 TABLET BY MOUTH EVERY DAY 21   Flavia Blanc MD   lisinopril (PRINIVIL;ZESTRIL) 10 MG tablet TAKE 1 TABLET BY MOUTH EVERY DAY 21   Flavia Blanc MD   fenofibrate (TRIGLIDE) 160 MG tablet TAKE 1 TABLET BY MOUTH EVERY DAY 21   Flavia Blanc MD   meloxicam NAE MONTES [facility]) 15 MG tablet Take 1 tablet by mouth daily 8/3/21   Tyler Bentley MD   Icosapent Ethyl (VASCEPA) 1 g CAPS capsule TAKE 2 CAPSULES BY MOUTH TWO TIMES A DAY 21   Flavia Blanc MD   rosuvastatin (CRESTOR) 20 MG tablet TAKE 1 TABLET BY MOUTH ONE TIME A DAY  6/15/21   Anahy Arguello MD   metFORMIN (GLUCOPHAGE) 1000 MG tablet TAKE 1 TABLET BY MOUTH TWO TIMES A DAY WITH MEALS 5/24/21   Anahy Arguello MD   Lancets [facility] PLUS [id number]) MISC USE TO TEST BLOOD SUGAR FOUR TIMES A DAY 5/11/21   Anahy Arguello MD   Cholecalciferol (VITAMIN D3) 125 MCG (5000 UT) TABS TAKE 1 TABLET BY MOUTH FOUR DAYS A WEEK AND TAKE 2 TABLETS BY MOUTH 3 DAYS A WEEK 4/15/21   Anahy Arguello MD   hydroxychloroquine (PLAQUENIL) 200 MG tablet TAKE 2 TABLETS BY MOUTH ONE TIME A DAY with breakfast 8/24/20   Historical Provider, MD   testosterone cypionate (DEPOTESTOTERONE CYPIONATE) 200 MG/ML injection INJECT 1 ML INTO THE MUSCLE EVERY 14 DAYS 8/27/20 8/28/21  Anahy Arguello MD   ONE TOUCH ULTRA TEST strip 4 times daily. 5/4/20   Anahy Arguello MD   Syringe, Disposable, 3 ML MISC Once every 2 weeks 5/4/20   Anahy Arguello MD   Lancets (Alyssa [address] PLUS [id number]) MISC use to test blood sugar 4 times daily 11/12/19   Silvia Ramirez MD   BREO ELLIPTA 200-25 MCG/INH AEPB INHALE 1 PUFF BY MOUTH ONE TIME A DAY 8/1/19   Historical Provider, MD   Blood Glucose Monitoring Suppl (ONE TOUCH ULTRA 2) w/Device KIT To check blood glucose 4 times daily.  4/17/19   MD WILFREDO HallmanYLE LANCETS MISC 1 Units by Does not apply route 4 times daily 4/16/19   Anahy Arguello MD   Blood Glucose Monitoring Suppl (FREESTYLE LITE) LIDIA 1 Device by Does not apply route 4 times daily 4/16/19   Anahy Arguello MD   propranolol (INDERAL) 80 MG tablet Take 80 mg by mouth daily     Historical Provider, MD   Probiotic Product (PROBIOTIC-10 PO) Take 10 mg by mouth daily     Historical Provider, MD   Cinnamon Bark POWD 500 mg by Does not apply route daily     Historical Provider, MD       Current medications:    Current Facility-Administered Medications   Medication Dose Route Frequency Provider Last Rate Last Admin    ceFAZolin (ANCEF) 2000 mg in dextrose 5 % 50 mL  FOOT SURGERY      OTHER SURGICAL HISTORY      Skull repair as child top of head indented now    SKIN BIOPSY      SKIN BIOPSY      TONSILLECTOMY      UMBILICAL HERNIA REPAIR  9/98/58    umbillical hernia repair with mesh       Social History:    Social History     Tobacco Use    Smoking status: Never Smoker    Smokeless tobacco: Never Used   Substance Use Topics    Alcohol use: No                                Counseling given: Not Answered      Vital Signs (Current):   Vitals:    09/24/21 0833   BP: 119/68   Pulse: 69   Resp: 16   Temp: 97.5 °F (36.4 °C)   TempSrc: Oral   SpO2: 96%   Weight: 230 lb (104.3 kg)   Height: 5' 10\" (1.778 m)                                              BP Readings from Last 3 Encounters:   09/24/21 119/68   05/28/21 124/80   11/18/19 130/78       NPO Status:                                                                                 BMI:   Wt Readings from Last 3 Encounters:   09/24/21 230 lb (104.3 kg)   09/22/21 230 lb (104.3 kg)   08/25/21 225 lb (102.1 kg)     Body mass index is 33 kg/m². CBC:   Lab Results   Component Value Date    WBC 8.5 05/20/2021    RBC 5.17 05/20/2021    HGB 15.2 05/20/2021    HCT 44.9 05/20/2021    MCV 87.0 05/20/2021    RDW 14.5 05/20/2021     05/20/2021       CMP:   Lab Results   Component Value Date     05/20/2021    K 4.7 05/20/2021    CL 99 05/20/2021    CO2 23 05/20/2021    BUN 24 05/20/2021    CREATININE 0.9 05/20/2021    GFRAA >60 05/20/2021    GFRAA >60 01/04/2013    AGRATIO 2.2 05/20/2021    LABGLOM >60 05/20/2021    GLUCOSE 111 05/20/2021    PROT 7.4 05/20/2021    PROT 7.8 01/04/2013    CALCIUM 9.8 05/20/2021    BILITOT 0.3 05/20/2021    ALKPHOS 53 05/20/2021    AST 23 05/20/2021    ALT 26 05/20/2021       POC Tests: No results for input(s): POCGLU, POCNA, POCK, POCCL, POCBUN, POCHEMO, POCHCT in the last 72 hours.     Coags: No results found for: PROTIME, INR, APTT    HCG (If Applicable): No results found for: PREGTESTUR, PREGSERUM, HCG, HCGQUANT     ABGs: No results found for: PHART, PO2ART, DKQ9YLX, UUY5SWM, BEART, D1VYLELF     Type & Screen (If Applicable):  No results found for: LABABO, LABRH    Drug/Infectious Status (If Applicable):  No results found for: HIV, HEPCAB    COVID-19 Screening (If Applicable): No results found for: COVID19        Anesthesia Evaluation  Patient summary reviewed no history of anesthetic complications:   Airway: Mallampati: III  TM distance: >3 FB   Neck ROM: full  Mouth opening: > = 3 FB Dental: normal exam         Pulmonary:                             ROS comment: Sarcoidosis   Cardiovascular:Negative CV ROS                      Neuro/Psych:                ROS comment: Parkinson's disease (HCC  Diabetic polyneuropathy GI/Hepatic/Renal:   (+) hiatal hernia, GERD:,           Endo/Other:    (+) Diabetes, . Abdominal:             Vascular: Other Findings:             Anesthesia Plan      general     ASA 3       Induction: intravenous. Anesthetic plan and risks discussed with patient.                       Juan Medina MD   9/24/2021

## 2021-09-24 NOTE — OP NOTE
Operative Note      Patient: Angelica Du  YOB: 1955  MRN: [id number]    Date of Procedure: 9/24/2021    Pre-Op Diagnosis: Complex tear of medial meniscus of right knee as current injury, initial encounter [S83.231A]    Post-Op Diagnosis: Same       Procedure(s):  RIGHT KNEE ARTHROSCOPY, SYNOVECTOMY, PARTIAL MEDIAL MENISCECTOMY, CHONDROPLASTY    Surgeon(s):  Esau Bustillo MD    Assistant:   * No surgical staff found *    Anesthesia: General    Estimated Blood Loss (mL): Minimal    Complications: None    Specimens:   * No specimens in log *    Implants:  * No implants in log *      Drains: * No LDAs found *    Findings: See below    Detailed Description of Procedure:     Operative Report: Indications: The patient is a 61 y.o. male with persistent right knee pain that interferes with daily activity. The he has failed conservative treatment and after reviewing the risks, benefits and alternatives, he has elected to undergo an arthroscopic procedure of the knee. Patient had an MRI proven degenerative medial meniscus tear, with early chondromalacia of the patellofemoral joint. I have reviewed the nature of the operation and the involved rehab, and he is prepared to proceed, consent was obtained and all questions were answered to his satisfaction. Description:   The patient was identified in the preoperative holding area and the correct site of the right knee was marked. He was then brought to the operating room and kept on the  hospital bed in the supine position and general anesthesia was administered. He was then transferred to the operative table and positioned supine, a tourniquet placed around the operative thigh, and the thigh was secured in a well padded hays. Surgical time out was called verifying necessary data including the administration of preoperative antibiotics. The right knee area was then prepped and draped in standard sterile fashion.     An 11 blade was used to create a 1 cm anterolateral portal and knee arthrotomy to gain access to the knee joint. The joint was distended with sterile saline to a pump pressure of 50 mmHg. standard diagnostic scope of all 4 compartments of the knee were carried out. Findings: Grade 0 chondropathy of the medial lateral patellar facets, grade 3  chondropathy of the trochlea 2 x 2 cm, grade 0 for the lateral femoral condyle, grade 0 for the lateral tibial plateau, grade 0 for the medial tibial plateau, grade 2 changes to the [address] [place] weight bearing zone 1 x 1 cm. Very prominent and inflammed infrapatellar fat pad, nonprominent medial synovial plica. ACL is intact with good tension. Normal PCL appearance and tension. Normal appearance and stability of the lateral meniscus. Degenerative complex bird-beak tear to the medial meniscus involving the entire posterior horn and mid body. We then created the anteromedial portal under direct visualization using an 18-gauge spinal needle. A 1 cm lisa-medial arthrotomy was created with an 11 blade. A patellar shaver (3.5 mm Torpedo Arthrex), was introduced into the notch and was used to perform a 3 compartment synovectomy in the patellofemoral joint, lateral compartment, and medial compartment given its extensive prominence and synovitis    The anterior horn medial meniscus was brought to view and it appeared to be intact. Grade 3 fraying of the trochlea measuring 2 x 2 cm at the junction  was then shaved back in a chondroplasty to i a stable rim. The medial meniscus  was brought into view and was probed. We identified an extensive degenerative complex tear involving the mid-body and entire posterior horn. The root was intact. This region was gently debrided with shaver to clean and to better define the extent and dimensions of the tear. An up biter was then taken to the torn areas of the meniscus to to cut them to a stable rim. Further partial meniscectomy was then completed with shaver once more. The meniscus posterior horn and root were then probed and showed good stability and no residual flipped fragment into the medial gutter. Lauretha Jeny was then run to retrieve any loose meniscal fragments. Approximately 40- 50% of the body and posterior horn of the medial meniscus was now remaining. Camera was then directed into the lateral compartment. The meniscus was probed and it was entirely intact along the posterior horn, mid body and anterior horn. The camera was withdrawn from the knee joint. The area was thoroughly irrigated again. An injection of 0.25% marcaine with epinephrine was carefully infiltrated around the arthroscopy portals to improve postoperative analgesia. Skin was closed with 3-0 nylon high tension farmer's stitch. A sterile dressing was applied along with an ace wrap around the knee down to the foot for gentle compression and swelling control post-operatively. The patient was then removed from the operative table, awakened and taken to recovery room in stable condition having tolerated the procedure well. The patient's foot was noted to be warm and pink color removal.     All needle and sponge counts matched the initial count per circulating and scrub personnel x2 prior to terminating this case. Plan: The patient is same day discharge. They are to be weight bearing on the operative leg with curtches, with range of motion non restricted. Bulky Dressing can be removed on post operative day 3 at our office, showering can commence on day 3 with waterproof mepilex, to be kept on until POD7. To follow-up in my office as schedule, and to start first session of physical therapy within 2-3 days for early ROM. Sincerely,        Sylvia Pruitt MD [address] [facility]   [address] [place], [address]  Email: [email or web address]  Office: [phone]      09/24/21  5:15 PM            Electronically signed by Jeramie Moss MD on 9/24/2021 at 5:13 PM

## 2021-09-24 NOTE — ANESTHESIA POSTPROCEDURE EVALUATION
Department of Anesthesiology  Postprocedure Note    Patient: Lakeisha Morley  MRN: [id number]  YOB: 1955  Date of evaluation: 9/24/2021  Time:  1:46 PM     Procedure Summary     Date: 09/24/21 Room / Location: [address] [place] [address]    Anesthesia Start: 1103 Anesthesia Stop: 0079    Procedure: RIGHT KNEE ARTHROSCOPY, SYNOVECTOMY, PARTIAL MEDIAL MENISCECTOMY, CHONDROPLASTY (Right ) Diagnosis:       Complex tear of medial meniscus of right knee as current injury, initial encounter      (Complex tear of medial meniscus of right knee as current injury, initial encounter [X88.600V])    Surgeons: Zaire Jim MD Responsible Provider: Lesley Pagan MD    Anesthesia Type: general ASA Status: 3          Anesthesia Type: general    Juan Pablo Phase I: Juan Pablo Score: 8    Juan Pablo Phase II: Juan Pablo Score: 10    Last vitals: Reviewed and per EMR flowsheets.        Anesthesia Post Evaluation    Patient location during evaluation: PACU  Patient participation: complete - patient participated  Level of consciousness: awake  Airway patency: patent  Nausea & Vomiting: no nausea and no vomiting  Complications: no  Cardiovascular status: hemodynamically stable  Respiratory status: acceptable  Hydration status: stable

## 2021-09-24 NOTE — PROGRESS NOTES
PACU Transfer to [facility]    Vitals:    09/24/21 1328   BP: (!) 154/94   Pulse: 64   Resp: 12   Temp: 97.5 °F (36.4 °C)   SpO2: 93%     BP MEETS PHASE ONE DC CRITERIA     Intake/Output Summary (Last 24 hours) at 9/24/2021 1336  Last data filed at 9/24/2021 1328  Gross per 24 hour   Intake 1255 ml   Output --   Net 1255 ml       Pain assessment:  present - adequately treated  Pain Level: 4    Patient transferred to care of CHETNA RN.    9/24/2021 1:36 PM

## 2021-09-24 NOTE — PROGRESS NOTES
Patient admitted to PACU # 11 from OR at 1230 post  RIGHT KNEE ARTHROSCOPY, SYNOVECTOMY, PARTIAL MEDIAL MENISCECTOMY, CHONDROPLASTY - Right per Magaly Sanchez MD  Attached to PACU monitoring system and report received from anesthesia provider. Patient was reported to be hemodynamically stable during procedure.   Patient drowsy on admission and arrived to PACU with ace wrap to E

## 2021-09-24 NOTE — PROGRESS NOTES
Ambulatory Surgery/Procedure Discharge Note    Vitals:    09/24/21 1344   BP: 122/78   Pulse: 64   Resp: 14   Temp: 97.5 °F (36.4 °C)   SpO2: 97%       In: 1255 [P.O.:120; I.V.:1135]  Out: -     Restroom use offered before discharge. Yes  Pt refuse any fluids offered. Toileted and walk to the bathroom. Pt remains safe. Pt is tolerable and had Percocet at PACU. Discharge instructions were read at bedside. Pain assessment:  none  Pain Level: 4        Patient discharged to home/self care.  Patient discharged via wheel chair by this nurse to waiting family/S.O.       9/24/2021 2:11 PM

## 2021-09-27 ENCOUNTER — HOSPITAL ENCOUNTER (OUTPATIENT)
Dept: PHYSICAL THERAPY | Age: 66
Setting detail: THERAPIES SERIES
Discharge: HOME | End: 2021-09-27
Payer: MEDICARE

## 2021-09-27 ENCOUNTER — OFFICE VISIT (OUTPATIENT)
Dept: ORTHOPEDIC SURGERY | Age: 66
End: 2021-09-27

## 2021-09-27 VITALS — RESPIRATION RATE: 12 BRPM | HEIGHT: 70 IN | BODY MASS INDEX: 32.93 KG/M2 | WEIGHT: 230 LBS

## 2021-09-27 DIAGNOSIS — Z98.890 S/P ARTHROSCOPY OF RIGHT KNEE: Primary | ICD-10-CM

## 2021-09-27 PROCEDURE — 97140 MANUAL THERAPY 1/> REGIONS: CPT

## 2021-09-27 PROCEDURE — 97161 PT EVAL LOW COMPLEX 20 MIN: CPT

## 2021-09-27 PROCEDURE — 97016 VASOPNEUMATIC DEVICE THERAPY: CPT

## 2021-09-27 PROCEDURE — 99024 POSTOP FOLLOW-UP VISIT: CPT | Performed by: ORTHOPAEDIC SURGERY

## 2021-09-27 PROCEDURE — 97110 THERAPEUTIC EXERCISES: CPT

## 2021-09-27 NOTE — PROGRESS NOTES
acute surgical concerns. Impression:  Encounter Diagnosis   Name Primary?  S/P arthroscopy of right knee Yes       Office Procedures:  No orders of the defined types were placed in this encounter. Treatment Plan:    Overall Terri Tucker is doing well. The pain is well-controlled. We recommend that He commence with physical therapy for timeline based progression of motion and strengthening. The patient was told that he is restricted from driving for at least 2-3 weeks postop. They were advised to continue their ASA, NSAIDs, and Sascha-Hose compression stockings for 14 days post surgery. All of his questions were fully answered today. We would like to see Terri Tucker back in 2 weeks for follow-up visit and suture removal.     Sincerely,    Moses Shahid MD [facility] and [address] [place] [address]   [address] [place], [address]  Email: [email or web address]  Office: [phone]    09/27/21  1:13 PM

## 2021-09-27 NOTE — PLAN OF CARE
[place] [address]  Phone [phone]   Fax [phone]                                                       Physical Therapy Certification    Dear Jasen Cervantes MD  ,    We had the pleasure of evaluating the following patient for physical therapy services at [address] [place] [address]. A summary of our findings can be found in the initial assessment below. This includes our plan of care. If you have any questions or concerns regarding these findings, please do not hesitate to contact me at the office phone number checked above.   Thank you for the referral.       Physician Signature:_______________________________Date:__________________  By signing above (or electronic signature), therapists plan is approved by physician      Patient: Jose Lester   : 1955   MRN: [id number]  Referring Physician:        Evaluation Date: 2021      Medical Diagnosis Information:  Diagnosis: M66.770J (ICD-10-CM) - Complex tear of medial meniscus of right knee as current injury, initial [id number] (ICD-10-CM) - Discoid lateral meniscus of right knee        Treatment Diagnosis: Pain and functional limiitations of R knee                                         Insurance information: PT Insurance Information: [employer] or [employer]   Precautions/ Contra-indications: DOS 21 RIGHT KNEE ARTHROSCOPY, SYNOVECTOMY, PARTIAL MEDIAL MENISCECTOMY, CHONDROPLASTY    C-SSRS Triggered by Intake questionnaire (Past 2 wk assessment):   [x] No, Questionnaire did not trigger screening.   [] Yes, Patient intake triggered further evaluation      [] C-SSRS Screening completed  [] PCP notified via Plan of Care  [] Emergency services notified     Latex Allergy:  [x]NO      []YES  Preferred Language for Healthcare:   [x]English       []other:    SUBJECTIVE: Patient stated complaint: Patient reports surgical intervention was on 9/24/21. Patient reports that since surgical intervention pain has primarily been a dull ache at 2/10 that is managable. Patient reports he is not taking pain medication due to pain being well controlled. Patient states he is consistently icing his knee with machine at home that is helping to control swelling. Patient reports swelling of the knee into his R foot. Patient reports he ambulated 4/10 of a mile with dog prior to eval.     Relevant Medical History: PD, neuropathy   Functional Disability Index: LEFS:     Pain Scale: 1-4/10. Pivoting on knee causes 4/10 pain. Easing factors: Ice and rest   Provocative factors: pivoting, stairs, walking, standing, squatting; kneeling. Type: [x]Constant   []Intermittent  []Radiating [x]Localized []other:     Numbness/Tingling: Patient reports he has numbness that can be bilateral in the feet, but mostly occurs in the R foot prior to surgical intervention. Occupation/School: Retired [employer]      Living Status/Prior Level of Function: Independent with ADLs and IADLs, 4-5 miles of walking a day.      Patient in [place]?: [] Yes [] No    Functional Testing  Sx Date  Prehab Date 9/20/21 Post-op Re-Eval Date 9/27/21 4 week F/U date  8 week F/U date  D/C Date       TUG (sec) 38 (LOB) needed PT intervention to maintain balance  NT      30 second sit to stand (reps) NT NT      6 minute walk (m) NT NT         Balance:    Narrowed EBONY (sec) 20 sec NT      Semi Tandem (sec) Not able to perform  Not able to perform       Tandem (sec) Not able to perform Not able to perform       SLS (sec) Not able to perform Not able to perform          Knee AROM L R L R L R L R L R   Flexion 130 85 134 85         Extension 0 -8 0 -15            Knee Ext: L R L R L R L R L R   MMT (of 5)             Knee Ext (#) 47.0 18.2  NT due to pain            Hip Abd:  L R L R L R L R L R   MMT (of 5)             Hip Abd (#) 11.5 18.9  NT due to pain            LEFS (raw) 35/80 33/80      OV entered              Balance: see above. Patient's balance is significantly limited and patient is a high fall risk. Reflexes/Sensation:    [x]Dermatomes/Myotomes intact    [x]Reflexes equal and normal bilaterally   []Other:    Joint mobility: Hypomobility of the R knee joint due to pain. []Normal    [x]Hypo   []Hyper    Palpation: Patient reports slight tenderness around medial and lateral R knee joint lines. Functional Mobility/Transfers: Patient requires additional time for all transfers and functional mobility. Patient presents with reduced overall balance due to reduced LE functional strength and balance deficits due to PD. Posture: Patient presents with forward flexed posture at all times. Bandages/Dressings/Incisions: Bandages recently changed and appear dry. Patient reports no increased bleeding or draining at this time. Gait: (include devices/WB status) Patients gait is antalgic at this time due to pain on R with reduced weight bearing and single limb stance time on R. Patient  is a high fall risk when using single point cane. Patient also presents with reduced heel strike and knee flexion during gait due to post-operative pain and swelling. Orthopedic Special Tests: NA                       [x] Patient history, allergies, meds reviewed. Medical chart reviewed. See intake form. Review Of Systems (ROS):  [x]Performed Review of systems (Integumentary, CardioPulmonary, Neurological) by intake and observation. Intake form has been scanned into medical record. Patient has been instructed to contact their primary care physician regarding ROS issues if not already being addressed at this time.       Co-morbidities/Complexities (which will affect course of rehabilitation):   []None           Arthritic conditions   []Rheumatoid arthritis (M05.9)  []Osteoarthritis (M19.91)   Cardiovascular conditions   []Hypertension (I10)  []Hyperlipidemia (E78.5)  []Angina pectoris (I20)  []Atherosclerosis (I70)   Musculoskeletal conditions   []Disc pathology   []Congenital spine pathologies   []Prior surgical intervention  []Osteoporosis (M81.8)  []Osteopenia (M85.8)   Endocrine conditions   []Hypothyroid (E03.9)  []Hyperthyroid Gastrointestinal conditions   []Constipation (E00.61)   Metabolic conditions   []Morbid obesity (E66.01)  [x]Diabetes type 1(E10.65) or 2 (E11.65)   []Neuropathy (G60.9)     Pulmonary conditions   []Asthma (J45)  []Coughing   []COPD (J44.9)   Psychological Disorders  []Anxiety (F41.9)  []Depression (F32.9)   []Other:   [x]Other:   PD        Barriers to/and or personal factors that will affect rehab potential:              []Age  []Sex              []Motivation/Lack of Motivation                        [x]Co-Morbidities              []Cognitive Function, education/learning barriers              []Environmental, home barriers              []profession/work barriers  []past PT/medical experience  []other:  Justification: Patient presenting with history of PD places him at significant risk for falls which could affect gait and balance post-operatively. Falls Risk Assessment (30 days):   [] Falls Risk assessed and no intervention required. [x] Falls Risk assessed and Patient requires intervention due to being higher risk   TUG score (>12s at risk): 38 sec. [x] Falls education provided, including gait training, fall prevention education, assistive device education. ASSESSMENT: Patient is a 61year old male who presents post-operative DOS 9/24/21 RIGHT KNEE ARTHROSCOPY, SYNOVECTOMY, PARTIAL MEDIAL MENISCECTOMY, CHONDROPLASTY. Patient presents with reduced proximal hip and quadriceps strength as well as reduced balance during session. Patient presents with significant functional deficits and gait abnormalities post-operatively. Pain is well controlled.  Patient educated on fall prevention, surgical expectations post-operatively, and gait training during session. Patient provided with HEP that focuses on improving ROM and gaining quadriceps strengthening. Functional Impairments:     [x]Noted lumbar/proximal hip/LE joint hypomobility   [x]Decreased LE functional ROM   [x]Decreased core/proximal hip strength and neuromuscular control   [x]Decreased LE functional strength   [x]Reduced balance/proprioceptive control   []other:      Functional Activity Limitations (from functional questionnaire and intake)   [x]Reduced ability to tolerate prolonged functional positions   [x]Reduced ability or difficulty with changes of positions or transfers between positions   [x]Reduced ability to maintain good posture and demonstrate good body mechanics with sitting, bending, and lifting   [x]Reduced ability to sleep   [] Reduced ability or tolerance with driving and/or computer work   [x]Reduced ability to perform lifting, carrying tasks   [x]Reduced ability to squat   []Reduced ability to forward bend   [x]Reduced ability to ambulate prolonged functional periods/distances/surfaces   [x]Reduced ability to ascend/descend stairs   [x]Reduced ability to run, hop, cut or jump   []other:    Participation Restrictions   [x]Reduced participation in self care activities   [x]Reduced participation in home management activities   [x]Reduced participation in work activities   [x]Reduced participation in social activities. [x]Reduced participation in sport/recreation activities. Classification :    [x]Signs/symptoms consistent with post-surgical status including decreased ROM, strength and function.    []Signs/symptoms consistent with joint sprain/strain   []Signs/symptoms consistent with patella-femoral syndrome   []Signs/symptoms consistent with knee OA/hip OA   []Signs/symptoms consistent with internal derangement of knee/Hip   []Signs/symptoms consistent with functional hip weakness/NMR control      []Signs/symptoms consistent with tendinitis/tendinosis    []signs/symptoms consistent with pathology which may benefit from Dry needling      []other:      Prognosis/Rehab Potential:      []Excellent   [x]Good    []Fair   []Poor    Tolerance of evaluation/treatment:    []Excellent   [x]Good    []Fair   []Poor    Physical Therapy Evaluation Complexity Justification  [x] A history of present problem with:  [] no personal factors and/or comorbidities that impact the plan of care;  []1-2 personal factors and/or comorbidities that impact the plan of care  [x]3 personal factors and/or comorbidities that impact the plan of care  [x] An examination of body systems using standardized tests and measures addressing any of the following: body structures and functions (impairments), activity limitations, and/or participation restrictions;:  [x] a total of 1-2 or more elements   [] a total of 3 or more elements   [] a total of 4 or more elements   [x] A clinical presentation with:  [x] stable and/or uncomplicated characteristics   [] evolving clinical presentation with changing characteristics  [] unstable and unpredictable characteristics;   [x] Clinical decision making of [x] low, [] moderate, [] high complexity using standardized patient assessment instrument and/or measurable assessment of functional outcome. [x] EVAL (LOW) 71657 (typically 30 minutes face-to-face)  [] EVAL (MOD) 78293 (typically 30 minutes face-to-face)  [] EVAL (HIGH) 37722 (typically 45 minutes face-to-face)  [] RE-EVAL     PLAN:   Frequency/Duration:  1-2 days per week for 8-10 Weeks:  Interventions:  [x]  Therapeutic exercise including: strength training, ROM, for Lower extremity and core   [x]  NMR activation and proprioception for LE, Glutes and Core   [x]  Manual therapy as indicated for LE, Hip and spine to include: Dry Needling/IASTM, STM, PROM, Gr I-IV mobilizations, manipulation.    [x] Modalities as needed that may include: thermal agents, E-stim, Biofeedback, US, iontophoresis as indicated  [x] Patient education on joint protection, postural re-education, activity modification, progression of HEP. HEP instruction: fall prevention, surgical expectations post-operatively, prognosis, and gait training during session. GOALS:  Patient stated goal: To return to ambulation for exercise. [] Progressing: [] Met: [] Not Met: [] Adjusted    Therapist goals for Patient:   Short Term Goals: To be achieved in: 2 weeks  1. Independent in HEP and progression per patient tolerance, in order to prevent re-injury. [] Progressing: [] Met: [] Not Met: [] Adjusted  2. Patient will have a decrease in pain to facilitate improvement in movement, function, and ADLs as indicated by Functional Deficits. [] Progressing: [] Met: [] Not Met: [] Adjusted    Long Term Goals: To be achieved in: 8-10 weeks  1. Disability index score of 35% or less for the LEFS to assist with reaching prior level of function. [] Progressing: [] Met: [] Not Met: [] Adjusted  2. Patient will demonstrate increased AROM to [facility] to allow for proper joint functioning as indicated by patients Functional Deficits. [] Progressing: [] Met: [] Not Met: [] Adjusted  3. Patient will demonstrate an increase in Strength to good proximal hip strength and control, within 5lb HHD in LE to allow for proper functional mobility as indicated by patients Functional Deficits. [] Progressing: [] Met: [] Not Met: [] Adjusted  4. Patient will return to pivoting, stair ascend/decend, prolonged walking, prolonged standing, squatting, and functional activities without increased symptoms or restriction. [] Progressing: [] Met: [] Not Met: [] Adjusted  5.  To be able to walk at least 3 miles for exercise, weight loss and QOL. (patient specific functional goal)    [] Progressing: [] Met: [] Not Met: [] Adjusted     Electronically signed by:  Lizzette Vagn PT

## 2021-09-27 NOTE — FLOWSHEET NOTE
Thoracic/Rib manipulation       CT MT/Mobs       PROM MT    -4 knee ext and 100 deg following MT. NMR re-education                                                                   Therapeutic Exercise and NMR EXR  [x] (76347) Provided verbal/tactile cueing for activities related to strengthening, flexibility, endurance, ROM  for improvements in scapular, scapulothoracic and UE control with self care, reaching, carrying, lifting, house/yardwork, driving/computer work.    [] (70970) Provided verbal/tactile cueing for activities related to improving balance, coordination, kinesthetic sense, posture, motor skill, proprioception  to assist with  scapular, scapulothoracic and UE control with self care, reaching, carrying, lifting, house/yardwork, driving/computer work. Therapeutic Activities:    [x] (90476 or 23169) Provided verbal/tactile cueing for activities related to improving balance, coordination, kinesthetic sense, posture, motor skill, proprioception and motor activation to allow for proper function of scapular, scapulothoracic and UE control with self care, carrying, lifting, driving/computer work.      Home Exercise Program:    [x] (05641) Reviewed/Progressed HEP activities related to strengthening, flexibility, endurance, ROM of scapular, scapulothoracic and UE control with self care, reaching, carrying, lifting, house/yardwork, driving/computer work  [] (75156) Reviewed/Progressed HEP activities related to improving balance, coordination, kinesthetic sense, posture, motor skill, proprioception of scapular, scapulothoracic and UE control with self care, reaching, carrying, lifting, house/yardwork, driving/computer work      Manual Treatments:  PROM / STM / Oscillations-Mobs:  G-I, II, III, IV (PA's, Inf., Post.)  [x] (65977) Provided manual therapy to mobilize soft tissue/joints of cervical/CT, scapular GHJ and UE for the purpose of modulating pain, promoting relaxation,  increasing ROM, reducing/eliminating soft tissue swelling/inflammation/restriction, improving soft tissue extensibility and allowing for proper ROM for normal function with self care, reaching, carrying, lifting, house/yardwork, driving/computer work    Modalities:  VASO 10 minutes to reduce postoperative pain and swelling./     Charges:  Timed Code Treatment Minutes: 30   Total Treatment Minutes: 60       [] EVAL (LOW) 70965 (typically 20 minutes face-to-face)  [x] EVAL (MOD) 76667 (typically 30 minutes face-to-face)  [] EVAL (HIGH) 39671 (typically 45 minutes face-to-face)  [] RE-EVAL     [x] BT(54620) x   1  [] IONTO (97770)  [] NMR (47558) x     [x] VASO (06554)  [x] Manual (72398) x 1     [] Other:  [] TA (08811)x     [] Mech Traction (49178)  [] ES(attended) (63051)     [] ES (un) (19815): If BWC Please Indicate Time In/Out and Total Minutes  CPT Code Time in Time out Total Min                                            GOALS:  Patient stated goal: To return to ambulation for exercise. []? Progressing: []? Met: []? Not Met: []? Adjusted     Therapist goals for Patient:   Short Term Goals: To be achieved in: 2 weeks  1. Independent in HEP and progression per patient tolerance, in order to prevent re-injury. []? Progressing: []? Met: []? Not Met: []? Adjusted  2. Patient will have a decrease in pain to facilitate improvement in movement, function, and ADLs as indicated by Functional Deficits. []? Progressing: []? Met: []? Not Met: []? Adjusted     Long Term Goals: To be achieved in: 8-10 weeks  1. Disability index score of 35% or less for the LEFS to assist with reaching prior level of function. []? Progressing: []? Met: []? Not Met: []? Adjusted  2. Patient will demonstrate increased AROM to [facility] to allow for proper joint functioning as indicated by patients Functional Deficits. []? Progressing: []? Met: []? Not Met: []? Adjusted  3.  Patient will demonstrate an increase in Strength to good proximal hip strength and control, within 5lb HHD in LE to allow for proper functional mobility as indicated by patients Functional Deficits. []? Progressing: []? Met: []? Not Met: []? Adjusted  4. Patient will return to pivoting, stair ascend/decend, prolonged walking, prolonged standing, squatting, and functional activities without increased symptoms or restriction. []? Progressing: []? Met: []? Not Met: []? Adjusted  5. To be able to walk at least 3 miles for exercise, weight loss and QOL. (patient specific functional goal)    []? Progressing: []? Met: []? Not Met: []? Adjusted   Progression Towards Functional goals:  [] Patient is progressing as expected towards functional goals listed. [] Progression is slowed due to complexities listed. [] Progression has been slowed due to co-morbidities. [x] Plan just implemented, too soon to assess goals progression  [] Other:     ASSESSMENT:  See eval    Return to Play: (if applicable)   []  Stage 1: Intro to Strength   []  Stage 2: Dynamic Strength and Intro to Plyometrics   []  Stage 3: Advanced Plyometrics and Intro to Throwing   []  Stage 4: Sport specific Training/Return to Sport     []  Ready to Return to Play, [employer]   []  Not Ready for Return to Sports   Comments:      Treatment/Activity Tolerance:  [x] Patient tolerated treatment well [] Patient limited by fatique  [] Patient limited by pain  [] Patient limited by other medical complications  [] Other:     Overall Progression Towards Functional goals/ Treatment Progress Update:  [] Patient is progressing as expected towards functional goals listed. [] Progression is slowed due to complexities/Impairments listed. [] Progression has been slowed due to co-morbidities.   [x] Plan just implemented, too soon to assess goals progression <30days   [] Goals require adjustment due to lack of progress  [] Patient is not progressing as expected and requires additional follow up with physician  [] Other    Prognosis for POC: [x] Good [] Fair  [] Poor    Patient requires continued skilled intervention: [x] Yes  [] No      PLAN: See eval  [] Continue per plan of care [] Alter current plan (see comments)  [x] Plan of care initiated [] Hold pending MD visit [] Discharge    Electronically signed by: Marquis Betts PT     Note: If patient does not return for scheduled/recommended follow up visits, this note will serve as a discharge from care along with the most recent update on progress.

## 2021-09-30 ENCOUNTER — HOSPITAL ENCOUNTER (OUTPATIENT)
Dept: PHYSICAL THERAPY | Age: 66
Setting detail: THERAPIES SERIES
Discharge: HOME | End: 2021-09-30
Payer: MEDICARE

## 2021-09-30 PROCEDURE — 97110 THERAPEUTIC EXERCISES: CPT

## 2021-09-30 PROCEDURE — 97140 MANUAL THERAPY 1/> REGIONS: CPT

## 2021-09-30 NOTE — FLOWSHEET NOTE
[employer]    Physical Therapy Treatment Note/ Progress Report:     Date:  2021    Patient Name:  Earnest Blake    :  1955  MRN: [id number]  Medical/Treatment Diagnosis Information:  · Diagnosis: K24.039X (ICD-10-CM) - Complex tear of medial meniscus of right knee as current injury, initial [id number] (ICD-10-CM) - Discoid lateral meniscus of right knee  · Treatment Diagnosis: Pain and functional limiitations of R knee  Insurance/Certification information:  PT Insurance Information: [facility] or [facility]  Physician Information:  Referring Practitioner: George Retana MD  Plan of care signed (Y/N):     Date of Patient follow up with Physician:      Progress Report: []  Yes  []  No     Functional Scale: LEFS 33/80   Date: 21    Date Range for reporting period:  Beginnin21  Ending:      Progress report due (10 Rx/or 30 days whichever is less):      Recertification due (POC duration/ or 90 days whichever is less): 21     Visit # Insurance Allowable Auth Needed   3 (2 post-op) 40 []Yes    []No     Pain level:  1-4/10     SUBJECTIVE:  Patient reports he continues to ambulate for exercise with single point cane. Patient states pain is well controlled. Patient reports he experiences swelling following exercise.      OBJECTIVE: See eval   Observation:    Test measurements:  PROM R knee flexion 105 deg, ext -5 deg    RESTRICTIONS/PRECAUTIONS: DOS 21 RIGHT KNEE ARTHROSCOPY, SYNOVECTOMY, PARTIAL MEDIAL MENISCECTOMY, POSSIBLE CHONDROPLASTY    Exercises/Interventions:   Therapeutic Ex 35' Wt / Resistance Sets/sec Reps Notes          Supine heel prop  5 min     Heel slide   1 10 10' hold   Quad set   2 10 6' hold   Seated knee flex AAROM  1 10 10' sec hold   Sit to stand tap ~30 deg of flexion  2 10    Prone knee flexion stretch  4 30'     SAQ  2 8 3 sec hold    bike  5 min  ROM and warm up Therapeutic Activities 10'               Education on fall prevention, surgical expectations post-operatively, and gait training during session. Manual Intervention 10'             Knee and patellar Mobs    I-II to reduce pain and improve motion                  PROM MT    -4 knee ext and 100 deg following MT. NMR re-education                                                                   Therapeutic Exercise and NMR EXR  [x] (89987) Provided verbal/tactile cueing for activities related to strengthening, flexibility, endurance, ROM  for improvements in scapular, scapulothoracic and UE control with self care, reaching, carrying, lifting, house/yardwork, driving/computer work.    [] (06447) Provided verbal/tactile cueing for activities related to improving balance, coordination, kinesthetic sense, posture, motor skill, proprioception  to assist with  scapular, scapulothoracic and UE control with self care, reaching, carrying, lifting, house/yardwork, driving/computer work. Therapeutic Activities:    [x] (28514 or 24259) Provided verbal/tactile cueing for activities related to improving balance, coordination, kinesthetic sense, posture, motor skill, proprioception and motor activation to allow for proper function of scapular, scapulothoracic and UE control with self care, carrying, lifting, driving/computer work.      Home Exercise Program:    [x] (27203) Reviewed/Progressed HEP activities related to strengthening, flexibility, endurance, ROM of scapular, scapulothoracic and UE control with self care, reaching, carrying, lifting, house/yardwork, driving/computer work  [] (93397) Reviewed/Progressed HEP activities related to improving balance, coordination, kinesthetic sense, posture, motor skill, proprioception of scapular, scapulothoracic and UE control with self care, reaching, carrying, lifting, house/yardwork, driving/computer work      Manual Treatments:  PROM / STM / Oscillations-Mobs:  G-I, II, III, IV (PA's, Inf., Post.)  [x] (85974) Provided manual therapy to mobilize soft tissue/joints of cervical/CT, scapular GHJ and UE for the purpose of modulating pain, promoting relaxation,  increasing ROM, reducing/eliminating soft tissue swelling/inflammation/restriction, improving soft tissue extensibility and allowing for proper ROM for normal function with self care, reaching, carrying, lifting, house/yardwork, driving/computer work    Modalities:  VASO 10 minutes to reduce postoperative pain and swelling./     Charges:  Timed Code Treatment Minutes: 45   Total Treatment Minutes: 45       [] EVAL (LOW) 43211 (typically 20 minutes face-to-face)  [] EVAL (MOD) 94903 (typically 30 minutes face-to-face)  [] EVAL (HIGH) 38358 (typically 45 minutes face-to-face)  [] RE-EVAL     [x] GM(64854) x   2  [] IONTO (94807)  [] NMR (80820) x     [] VASO (75027)  [x] Manual (94356) x 1     [] Other:  [] TA (75896)x     [] Mech Traction (77549)  [] ES(attended) (65798)     [] ES (un) (80345): If BWC Please Indicate Time In/Out and Total Minutes  CPT Code Time in Time out Total Min                                            GOALS:  Patient stated goal: To return to ambulation for exercise. []? Progressing: []? Met: []? Not Met: []? Adjusted     Therapist goals for Patient:   Short Term Goals: To be achieved in: 2 weeks  1. Independent in HEP and progression per patient tolerance, in order to prevent re-injury. []? Progressing: []? Met: []? Not Met: []? Adjusted  2. Patient will have a decrease in pain to facilitate improvement in movement, function, and ADLs as indicated by Functional Deficits. []? Progressing: []? Met: []? Not Met: []? Adjusted     Long Term Goals: To be achieved in: 8-10 weeks  1. Disability index score of 35% or less for the LEFS to assist with reaching prior level of function. []? Progressing: []? Met: []?  Not Met: []? Adjusted  2. Patient will demonstrate increased AROM to [facility] to allow for proper joint functioning as indicated by patients Functional Deficits. []? Progressing: []? Met: []? Not Met: []? Adjusted  3. Patient will demonstrate an increase in Strength to good proximal hip strength and control, within 5lb HHD in LE to allow for proper functional mobility as indicated by patients Functional Deficits. []? Progressing: []? Met: []? Not Met: []? Adjusted  4. Patient will return to pivoting, stair ascend/decend, prolonged walking, prolonged standing, squatting, and functional activities without increased symptoms or restriction. []? Progressing: []? Met: []? Not Met: []? Adjusted  5. To be able to walk at least 3 miles for exercise, weight loss and QOL. (patient specific functional goal)    []? Progressing: []? Met: []? Not Met: []? Adjusted   Progression Towards Functional goals:  [] Patient is progressing as expected towards functional goals listed. [] Progression is slowed due to complexities listed. [] Progression has been slowed due to co-morbidities. [x] Plan just implemented, too soon to assess goals progression  [] Other:     ASSESSMENT:  Patient tolerated session very well. ROM continues to show improvement and patient tolerated slightly increased strengthening well today. Gait improved during session with improved heel strike. Patient highly motivated to improve.      Return to Play: (if applicable)   []  Stage 1: Intro to Strength   []  Stage 2: Dynamic Strength and Intro to Plyometrics   []  Stage 3: Advanced Plyometrics and Intro to Throwing   []  Stage 4: Sport specific Training/Return to Sport     []  Ready to Return to Play, [employer]   []  Not Ready for Return to Sports   Comments:      Treatment/Activity Tolerance:  [x] Patient tolerated treatment well [] Patient limited by fatique  [] Patient limited by pain  [] Patient limited by other medical complications  [] Other:     Overall Progression Towards Functional goals/ Treatment Progress Update:  [] Patient is progressing as expected towards functional goals listed. [] Progression is slowed due to complexities/Impairments listed. [] Progression has been slowed due to co-morbidities. [x] Plan just implemented, too soon to assess goals progression <30days   [] Goals require adjustment due to lack of progress  [] Patient is not progressing as expected and requires additional follow up with physician  [] Other    Prognosis for POC: [x] Good [] Fair  [] Poor    Patient requires continued skilled intervention: [x] Yes  [] No      PLAN: Continue to progress per patient tolerance. [] Continue per plan of care [] Alter current plan (see comments)  [x] Plan of care initiated [] Hold pending MD visit [] Discharge    Electronically signed by: Zan Mims, PT     Note: If patient does not return for scheduled/recommended follow up visits, this note will serve as a discharge from care along with the most recent update on progress.

## 2021-10-04 ENCOUNTER — HOSPITAL ENCOUNTER (OUTPATIENT)
Dept: PHYSICAL THERAPY | Age: 66
Setting detail: THERAPIES SERIES
Discharge: HOME | End: 2021-10-04
Payer: MEDICARE

## 2021-10-04 PROCEDURE — 97140 MANUAL THERAPY 1/> REGIONS: CPT

## 2021-10-04 PROCEDURE — 97016 VASOPNEUMATIC DEVICE THERAPY: CPT

## 2021-10-04 PROCEDURE — 97110 THERAPEUTIC EXERCISES: CPT

## 2021-10-04 NOTE — FLOWSHEET NOTE
[employer]    Physical Therapy Treatment Note/ Progress Report:     Date:  10/4/2021    Patient Name:  Theresa Chavira    :  1955  MRN: [id number]  Medical/Treatment Diagnosis Information:  · Diagnosis: Q19.625X (ICD-10-CM) - Complex tear of medial meniscus of right knee as current injury, initial [id number] (ICD-10-CM) - Discoid lateral meniscus of right knee  · Treatment Diagnosis: Pain and functional limiitations of R knee  Insurance/Certification information:  PT Insurance Information: [employer]et or [facility]  Physician Information:  Referring Practitioner: Devin England MD  Plan of care signed (Y/N):     Date of Patient follow up with Physician:      Progress Report: []  Yes  []  No     Functional Scale: LEFS 33/80   Date: 21    Date Range for reporting period:  Beginnin21  Ending:      Progress report due (10 Rx/or 30 days whichever is less): 10/13/19     Recertification due (POC duration/ or 90 days whichever is less): 21     Visit # Insurance Allowable Auth Needed   4 (3 post-op) 40 []Yes    []No     Pain level:  1-4/10     SUBJECTIVE:  Patient reports he continues to walk at least 2 miles without increased pain. Patient reports swelling is well controlled. Patient reports he feels his function is better than prior to surgical intervention due to reduced pain.       OBJECTIVE: See eval   Observation:    Test measurements:  PROM R knee flexion 116 deg, ext -4 deg    RESTRICTIONS/PRECAUTIONS: DOS 21 RIGHT KNEE ARTHROSCOPY, SYNOVECTOMY, PARTIAL MEDIAL MENISCECTOMY, POSSIBLE CHONDROPLASTY    Exercises/Interventions:   Therapeutic Ex 35' Wt / Resistance Sets/sec Reps Notes          Supine heel prop  5 min     Heel slide   1 10 10' hold   Quad set   2 10 6' hold   Seated knee flex AAROM  1 10 10' sec hold   Sit to stand tap   2 10    Prone knee flexion stretch  4 30'     SAQ 2lb  2 8 5 sec hold    bike  5 min  ROM and warm up   LAQ 2/3lb 2 8    Standing hip abduction/ext  1 10                            Therapeutic Activities 10'               Education on fall prevention, surgical expectations post-operatively, and gait training during session. Manual Intervention 10'             Knee and patellar Mobs    I-III significantly improved ROM during session. PROM MT    -4 knee ext and 100 deg following MT. NMR re-education                                                                   Therapeutic Exercise and NMR EXR  [x] (09782) Provided verbal/tactile cueing for activities related to strengthening, flexibility, endurance, ROM  for improvements in scapular, scapulothoracic and UE control with self care, reaching, carrying, lifting, house/yardwork, driving/computer work.    [] (10688) Provided verbal/tactile cueing for activities related to improving balance, coordination, kinesthetic sense, posture, motor skill, proprioception  to assist with  scapular, scapulothoracic and UE control with self care, reaching, carrying, lifting, house/yardwork, driving/computer work. Therapeutic Activities:    [x] (22390 or 64226) Provided verbal/tactile cueing for activities related to improving balance, coordination, kinesthetic sense, posture, motor skill, proprioception and motor activation to allow for proper function of scapular, scapulothoracic and UE control with self care, carrying, lifting, driving/computer work.      Home Exercise Program:    [x] (92873) Reviewed/Progressed HEP activities related to strengthening, flexibility, endurance, ROM of scapular, scapulothoracic and UE control with self care, reaching, carrying, lifting, house/yardwork, driving/computer work  [] (22738) Reviewed/Progressed HEP activities related to improving balance, coordination, kinesthetic sense, posture, motor skill, proprioception of scapular, scapulothoracic and UE control with self care, reaching, carrying, lifting, house/yardwork, driving/computer work      Manual Treatments:  PROM / STM / Oscillations-Mobs:  G-I, II, III, IV (Ethan, Inf., Post.)  [x] (15565) Provided manual therapy to mobilize soft tissue/joints of cervical/CT, scapular GHJ and UE for the purpose of modulating pain, promoting relaxation,  increasing ROM, reducing/eliminating soft tissue swelling/inflammation/restriction, improving soft tissue extensibility and allowing for proper ROM for normal function with self care, reaching, carrying, lifting, house/yardwork, driving/computer work    Modalities:  VASO 10 minutes to reduce postoperative pain and swelling./     Charges:  Timed Code Treatment Minutes: 45   Total Treatment Minutes: 55       [] EVAL (LOW) 76231 (typically 20 minutes face-to-face)  [] EVAL (MOD) 03104 (typically 30 minutes face-to-face)  [] EVAL (HIGH) 20493 (typically 45 minutes face-to-face)  [] RE-EVAL     [x] GC(91462) x   2  [] IONTO (90264)  [] NMR (97987) x     [x] VASO (31938)  [x] Manual (82903) x 1     [] Other:  [] TA (20575)x     [] Mech Traction (48087)  [] ES(attended) (27459)     [] ES (un) (66811): If BWC Please Indicate Time In/Out and Total Minutes  CPT Code Time in Time out Total Min                                            GOALS:  Patient stated goal: To return to ambulation for exercise. []? Progressing: [x]? Met: []? Not Met: []? Adjusted     Therapist goals for Patient:   Short Term Goals: To be achieved in: 2 weeks  1. Independent in HEP and progression per patient tolerance, in order to prevent re-injury. [x]? Progressing: []? Met: []? Not Met: []? Adjusted  2. Patient will have a decrease in pain to facilitate improvement in movement, function, and ADLs as indicated by Functional Deficits. [x]? Progressing: []? Met: []? Not Met: []? Adjusted     Long Term Goals: To be achieved in: 8-10 weeks  1.  Disability index score of 35% or less for the LEFS to assist with reaching prior level of function. [x]? Progressing: []? Met: []? Not Met: []? Adjusted  2. Patient will demonstrate increased [facility] to allow for proper joint functioning as indicated by patients Functional Deficits. [x]? Progressing: []? Met: []? Not Met: []? Adjusted  3. Patient will demonstrate an increase in Strength to good proximal hip strength and control, within 5lb HHD in LE to allow for proper functional mobility as indicated by patients Functional Deficits. [x]? Progressing: []? Met: []? Not Met: []? Adjusted  4. Patient will return to pivoting, stair ascend/decend, prolonged walking, prolonged standing, squatting, and functional activities without increased symptoms or restriction. [x]? Progressing: []? Met: []? Not Met: []? Adjusted  5. To be able to walk at least 3 miles for exercise, weight loss and QOL. (patient specific functional goal)    [x]? Progressing: []? Met: []? Not Met: []? Adjusted   Progression Towards Functional goals:  [x] Patient is progressing as expected towards functional goals listed. [] Progression is slowed due to complexities listed. [] Progression has been slowed due to co-morbidities. [] Plan just implemented, too soon to assess goals progression  [] Other:     ASSESSMENT:  Patient tolerated session very well. ROM continues to show improvement and is progressing well. Patient is pleased with improvements in function. Patient continues to display reduced TKE at this time that is improving. Patient tolerated increased strengthening well during session.      Return to Play: (if applicable)   []  Stage 1: Intro to Strength   []  Stage 2: Dynamic Strength and Intro to Plyometrics   []  Stage 3: Advanced Plyometrics and Intro to Throwing   []  Stage 4: Sport specific Training/Return to Sport     []  Ready to Return to Play, [employer] Stages   []  Not Ready for Return to Sports   Comments:      Treatment/Activity Tolerance:  [x] Patient tolerated treatment well [] Patient limited by fatique  [] Patient limited by pain  [] Patient limited by other medical complications  [] Other:     Overall Progression Towards Functional goals/ Treatment Progress Update:  [] Patient is progressing as expected towards functional goals listed. [] Progression is slowed due to complexities/Impairments listed. [] Progression has been slowed due to co-morbidities. [x] Plan just implemented, too soon to assess goals progression <30days   [] Goals require adjustment due to lack of progress  [] Patient is not progressing as expected and requires additional follow up with physician  [] Other    Prognosis for POC: [x] Good [] Fair  [] Poor    Patient requires continued skilled intervention: [x] Yes  [] No      PLAN: Continue to progress per patient tolerance. [x] Continue per plan of care [] Alter current plan (see comments)  [] Plan of care initiated [] Hold pending MD visit [] Discharge    Electronically signed by: Zan Sic, PT     Note: If patient does not return for scheduled/recommended follow up visits, this note will serve as a discharge from care along with the most recent update on progress.

## 2021-10-06 ENCOUNTER — HOSPITAL ENCOUNTER (OUTPATIENT)
Dept: PHYSICAL THERAPY | Age: 66
Setting detail: THERAPIES SERIES
Discharge: HOME | End: 2021-10-06
Payer: MEDICARE

## 2021-10-06 PROCEDURE — 97016 VASOPNEUMATIC DEVICE THERAPY: CPT

## 2021-10-06 PROCEDURE — 97110 THERAPEUTIC EXERCISES: CPT

## 2021-10-06 NOTE — PROGRESS NOTES
Chief Complaint  Knee Pain (Pre-op right knee 9/24)      History of Present Illness:  Addis Sahni is a pleasant 61 y.o. male  Who is Here for his preoperative visit for an upcoming right knee arthroscopy and medial meniscal debridement. He denies any setbacks. He has a history of Parkinson's disease and type 2 diabetes with a hemoglobin A1c of 6.3. He has completed his preoperative work-up with no concerns. Medical History:  Patient's medications, allergies, past medical, surgical, social and family histories were reviewed and updated as appropriate. Pain Assessment  Location of Pain: Knee  Location Modifiers: Right  Severity of Pain: 2  Quality of Pain: Dull  Duration of Pain: Persistent  Frequency of Pain: Constant  Aggravating Factors: Bending  Limiting Behavior: Yes  Relieving Factors: Ice, Rest  Result of Injury: No  Work-Related Injury: No  Are there other pain locations you wish to document?: No  ROS: Review of systems reviewed from Patient History Form completed today and available in the patient's chart under the Media tab. Pertinent items are noted in HPI  Review of systems reviewed from Patient History Form completed today and available in the patient's chart under the Media tab. Vital Signs:  Resp 12   Ht 5' 10\" (1.778 m)   Wt 230 lb (104.3 kg)   BMI 33.00 kg/m²         Neuro: Alert & oriented x 3,  normal,  no focal deficits noted. Normal affect. Eyes: sclera clear  Ears: Normal external ear  Mouth:  No perioral lesions  Pulm: Respirations unlabored and regular  Pulse: Extremities well perfused. 2+ peripheral pulses. Skin: Warm. No ulcerations. Constitutional: The physical examination finds the patient to be well-developed and well-nourished. The patient is alert and oriented x3 and was cooperative throughout the visit. Right knee exam    Gait: No use of assistive devices. No antalgic gait. Alignment: normal alignment.     Inspection/skin: Skin is intact Patient Information    Radiology -- Please call RADIOLOGY [phone] to schedule your ULTRASOUND     Follow Up  -- Follow up with your regular Primary Care Provider.   -- You have been referred to GASTROENTEROLOGY [phone]. Please call if you have not heard from that department in 3 days.     Additional Educational Resources:  For additional resources regarding your symptoms, diagnosis, or further health information, please visit the Health Resources section on [email or web address] or the Online Health Resources section in [employer].        Patient Education     Consejos para controlar el reflujo de ácido (agruras)    Para controlar el reflujo de ácido es necesario hacer algunos cambios básicos en la alimentación y el estilo de yesi. Los siguientes consejos pueden ser suficientes para aliviar el malestar.  Vigile lo que come  · Evite los alimentos grasosos o demasiado picantes.  · Coma menos alimentos ácidos, shaina cítricos y alimentos a base de tomates, ya que pueden agravar los síntomas.  · Limite el consumo de alcohol, cafeína y bebidas gaseosas. Todas estas bebidas aumentan el reflujo.  · Intente limitar el consumo de chocolate y menta. Martine puede empeorar el reflujo de ácido en algunas personas.  Vigile cuándo come  · Evite acostarse luis eduardo 3 horas después de negra comido.  · No coma nada antes de irse a la cama.  Mantenga la jennifer levantada  Mantener la jennifer y el pecho elevados unas 4 a 6 pulgadas, le ayudará a aliviar el reflujo cuando esté acostado. Ponga soportes debajo de la cabecera de la cama para elevarla.  Otros cambios  · Pierda el exceso de peso.  · No isa ejercicio poco antes de acostarse.  · Evite usar ropas demasiado ajustadas.  · Limite el uso de aspirina e ibuprofeno.  · Deje de fumar.   © 2298-1990 [employer] [place]. [address] [place] [address], [place] [address]. Todos los derechos reservados. Esta información no pretende sustituir la atención médica profesional. Sólo zaidi médico puede  diagnosticar y tratar un problema de enid.           Patient Education     Cambios en el estilo de yesi para controlar la enfermedad por reflujo gastroesofágico  Cuando se tiene la enfermedad por reflujo gastroesofágico (GERD, por helga siglas en inglés), se siente que los ácidos estomacales suben a la boca. Hacer cambios en el estilo de yesi con frecuencia puede mejorar helga síntomas. Crouse es cierto si marvel medicamentos para controlar la GERD o no. Hable con zaidi proveedor de atención médica acerca de las siguientes sugerencias. Pueden ayudar a aliviar helga síntomas.      Mantenga la jennifer levantada  El reflujo ocurre con mayor frecuencia cuando se está acostado, pues los fluidos estomacales pueden subir con mayor facilidad. Levantar la cabecera de zaidi cama de 4 a 6 pulgadas puede ser de ayuda. ¿Cómo hacerlo?  · Ponga bloques o libros debajo de las patas de la cabecera de la cama. O coloque jorge cuña debajo del colchón. Muchas fábricas de espumas pueden hacerle jorge que se acomode a helga necesidades. La cuña debe ir desde zaidi cintura a la parte superior de zaidi jennifer.  · No coloque zaidi jennifer sobre varias almohadas simplemente. Crouse aumenta la presión sobre el estómago. Puede empeorar la GERD.  Cuide helga hábitos alimenticios  Ciertos alimentos pueden aumentar la cantidad de ácido estomacal o relajar el esfínter esofágico inferior. Crouse aumenta las probabilidades de desarrollar GERD. Es conveniente evitar lo siguiente si le provocan síntomas:  · Café, té y bebidas carbonatadas (con o sin cafeína)  · Comidas grasosas o picantes  · Menta, chocolate, cebolla, tomate y cítricos  · Menta  · Cualquier otro alimento que le cause irritación estomacal o dolor  Alivie la presión  Estas son algunas recomendaciones:  · Reduzca el tamaño de helga comidas, aunque tenga que comer con más frecuencia.  · No se acueste enseguida después de comer. Espere unas horas para que se vacíe el estómago.  · No use cinturones ajustados o ropa  ajustada.  · Baje de peso.  Tabaco y alcohol  No fume ni prasad alcohol. Pueden empeorar los síntomas de GERD.  © 7964-6275 [employer]. [address] [place] [address], [place] [address]. Todos los derechos reservados. Esta información no pretende sustituir la atención médica profesional. Sólo zaidi médico puede diagnosticar y tratar un problema de enid.           Patient Education     ¿Qué es la enfermedad del reflujo gastroesofágico?     Un esfínter débil permite que los contenidos ácidos del estómago retrocedan hacia el esófago. Hulett se llama reflujo.     Si con frecuencia tiene jorge sensación dolorosa de ardor en el pecho cuando acaba de comer, es posible que tenga la enfermedad del reflujo gastroesofágico (“GERD”, por helga siglas en inglés). El ardor de estómago recurrente es un síntoma clásico de esta condición, jacky podría experimentar otros. Un diagnóstico de GERD solo se hace después de que zaidi proveedor de atención médica le haya realizado jorge evaluación a fondo.  Nota: El dolor en el pecho también podría ser causado por problemas del corazón. Asegúrese de que zaidi médico evalúe cualquier dolor que sienta en el pecho.  Cuando se tiene un problema de reflujo  Después de que usted come, la comida viaja desde zaidi boca hacia zaidi esófago y, de ahí, a zaidi estómago. A lo burak de afshan trinity, la comida pasa por jorge válvula de jorge pili vía, llamada esfínter esofágico inferior, que está ubicado en la boca del estómago. Por lo general, leola esfínter se abre cuando usted traga para permitir que la comida ingrese al estómago y, luego, vuelve a cerrarse rápidamente. Cuando hay reflujo gastroesofágico, leola esfínter no funciona normalmente y permite que la comida y el ácido del estómago suban hacia el esófago (eso es el reflujo).  Algunos síntomas comunes  · Ardor o eructos frecuentes  · Líquido de sabor agrio que le sube a la boca  · Síntomas que empeoran después de comer, al doblarse o al acostarse  · Dificultad para tragar  o dolor al hacerlo  · Tos seca crónica  · Náuseas o vómito    Para aliviar el malestar  Usted y zaidi proveedor de atención médica pueden colaborar para encontrar las opciones de tratamiento que mejor alivian helga síntomas. Estas opciones de tratamiento pueden incluir cambios en el estilo de yesi, medicamentos y, posiblemente, cirugía.  Muchas personas encuentran que helga síntomas de GERD disminuyen cuando comen comidas pequeñas ffecuentes en lugar de nisha comidas grandes. También puede ayudar reducir la cantidad de alimentos grasosos que consume en zaidi dieta.  Los siguientes alimentos tienden a causar problemas a las personas diagnosticadas con GERD:  · tomates y productos a base de tomate  · bebidas alcohólicas  · café  · menta  · comidas grasosas o muy condimentadas  Pídale a zaidi proveedor de atención médica que lo remita a un nutricionista si necesita aprender a hacer los cambios en zaidi alimentación que le ayuden a controlar los síntomas del reflujo gastroesofágico.  © 7107-3281 [employer]. [address] [place] [address], [place] [address]. Todos los derechos reservados. Esta información no pretende sustituir la atención médica profesional. Sólo zaidi médico puede diagnosticar y tratar un problema de enid.            patient education and coordinating care. Follow up in: No follow-ups on file. Sincerely,    Magaly Sanchez MD [facility]   [address], [address] [place] [address], [facility]  Email: [email or web address]  Office: [phone]    09/22/21  11:46 AM      The encounter with Baldo Martel was carried out by myself, Dr Yadira Palmer, who personally examined the patient and reviewed the plan. This dictation was performed with a verbal recognition program (DRAGON) and it was checked for errors. It is possible that there are still dictated errors within this office note. If so, please bring any errors to my attention for an addendum. All efforts were made to ensure that this office note is accurate.

## 2021-10-06 NOTE — FLOWSHEET NOTE
[facility]    Physical Therapy Treatment Note/ Progress Report:     Date:  10/6/2021    Patient Name:  Memo Fung    :  1955  MRN: [id number]  Medical/Treatment Diagnosis Information:  · Diagnosis: C38.027E (ICD-10-CM) - Complex tear of medial meniscus of right knee as current injury, initial [id number] (ICD-10-CM) - Discoid lateral meniscus of right knee  · Treatment Diagnosis: Pain and functional limiitations of R knee  Insurance/Certification information:  PT Insurance Information: Aet or [facility]  Physician Information:  Referring Practitioner: Huber Bell MD  Plan of care signed (Y/N):     Date of Patient follow up with Physician:      Progress Report: []  Yes  []  No     Functional Scale: LEFS 33/80   Date: 21    Date Range for reporting period:  Beginnin21  Ending:      Progress report due (10 Rx/or 30 days whichever is less):      Recertification due (POC duration/ or 90 days whichever is less): 21     Visit # Insurance Allowable Auth Needed   5 (4 post-op) 40 []Yes    []No     Pain level:  1-410     SUBJECTIVE:  Patient reports he ambulated 4 total miles and experienced increased stiffness and swelling. Patient reports he is doing very well and HEP is helping to gain ROM. Patient is staying consistent with icing to reduce and manage swelling/pain.      OBJECTIVE: See eval   Observation:    Test measurements:  10/6/21 PROM R knee flexion 119 deg, ext -2 deg    RESTRICTIONS/PRECAUTIONS: DOS 21 RIGHT KNEE ARTHROSCOPY, SYNOVECTOMY, PARTIAL MEDIAL MENISCECTOMY, POSSIBLE CHONDROPLASTY    Exercises/Interventions:   Therapeutic Ex 45' Wt / Resistance Sets/sec Reps Notes          Supine heel prop 3 lb 5 min     Heel slide   1 10 10' hold   Quad set   2 10 6' hold   Seated knee flex AAROM  1 10 10' sec hold   Sit to stand tap   2 10    Prone knee flexion stretch  4 30'     SAQ 5lb  2 8 5 sec hold bike  5 min  ROM and warm up   LAQ 5b 2 8    Standing hip abduction/ext  1 10    Leg press 105 2 8                     Therapeutic Activities 10'               Education on fall prevention, surgical expectations post-operatively, and gait training during session. Manual Intervention 10'             Knee and patellar Mobs    I-III significantly improved ROM during session. PROM MT    -4 knee ext and 100 deg following MT. NMR re-education                                                                   Therapeutic Exercise and NMR EXR  [x] (62078) Provided verbal/tactile cueing for activities related to strengthening, flexibility, endurance, ROM  for improvements in scapular, scapulothoracic and UE control with self care, reaching, carrying, lifting, house/yardwork, driving/computer work.    [] (58337) Provided verbal/tactile cueing for activities related to improving balance, coordination, kinesthetic sense, posture, motor skill, proprioception  to assist with  scapular, scapulothoracic and UE control with self care, reaching, carrying, lifting, house/yardwork, driving/computer work. Therapeutic Activities:    [x] (92903 or 81022) Provided verbal/tactile cueing for activities related to improving balance, coordination, kinesthetic sense, posture, motor skill, proprioception and motor activation to allow for proper function of scapular, scapulothoracic and UE control with self care, carrying, lifting, driving/computer work.      Home Exercise Program:    [x] (23874) Reviewed/Progressed HEP activities related to strengthening, flexibility, endurance, ROM of scapular, scapulothoracic and UE control with self care, reaching, carrying, lifting, house/yardwork, driving/computer work  [] (13211) Reviewed/Progressed HEP activities related to improving balance, coordination, kinesthetic sense, posture, motor skill, proprioception of scapular, scapulothoracic and UE control with self care, reaching, carrying, lifting, house/yardwork, driving/computer work      Manual Treatments:  PROM / STM / Oscillations-Mobs:  G-I, II, III, IV (PA's, Inf., Post.)  [x] (74257) Provided manual therapy to mobilize soft tissue/joints of cervical/CT, scapular GHJ and UE for the purpose of modulating pain, promoting relaxation,  increasing ROM, reducing/eliminating soft tissue swelling/inflammation/restriction, improving soft tissue extensibility and allowing for proper ROM for normal function with self care, reaching, carrying, lifting, house/yardwork, driving/computer work    Modalities:  VASO 10 minutes to reduce postoperative pain and swelling./     Charges:  Timed Code Treatment Minutes: 45   Total Treatment Minutes: 55       [] EVAL (LOW) 47834 (typically 20 minutes face-to-face)  [] EVAL (MOD) 94150 (typically 30 minutes face-to-face)  [] EVAL (HIGH) 69792 (typically 45 minutes face-to-face)  [] RE-EVAL     [x] IV(97375) x   3  [] IONTO (28977)  [] NMR (88631) x     [x] VASO (22931)  [] Manual (46929) x      [] Other:  [] TA (96858)x     [] Mech Traction (69222)  [] ES(attended) (72118)     [] ES (un) (65157): If BWC Please Indicate Time In/Out and Total Minutes  CPT Code Time in Time out Total Min                                            GOALS:  Patient stated goal: To return to ambulation for exercise. []? Progressing: [x]? Met: []? Not Met: []? Adjusted     Therapist goals for Patient:   Short Term Goals: To be achieved in: 2 weeks  1. Independent in HEP and progression per patient tolerance, in order to prevent re-injury. [x]? Progressing: []? Met: []? Not Met: []? Adjusted  2. Patient will have a decrease in pain to facilitate improvement in movement, function, and ADLs as indicated by Functional Deficits. [x]? Progressing: []? Met: []? Not Met: []? Adjusted     Long Term Goals: To be achieved in: 8-10 weeks  1.  Disability index score of 35% or less for the LEFS to assist with reaching prior level of function. [x]? Progressing: []? Met: []? Not Met: []? Adjusted  2. Patient will demonstrate increased AROM to [facility] to allow for proper joint functioning as indicated by patients Functional Deficits. [x]? Progressing: []? Met: []? Not Met: []? Adjusted  3. Patient will demonstrate an increase in Strength to good proximal hip strength and control, within 5lb HHD in LE to allow for proper functional mobility as indicated by patients Functional Deficits. [x]? Progressing: []? Met: []? Not Met: []? Adjusted  4. Patient will return to pivoting, stair ascend/decend, prolonged walking, prolonged standing, squatting, and functional activities without increased symptoms or restriction. [x]? Progressing: []? Met: []? Not Met: []? Adjusted  5. To be able to walk at least 3 miles for exercise, weight loss and QOL. (patient specific functional goal)    [x]? Progressing: []? Met: []? Not Met: []? Adjusted   Progression Towards Functional goals:  [x] Patient is progressing as expected towards functional goals listed. [] Progression is slowed due to complexities listed. [] Progression has been slowed due to co-morbidities. [] Plan just implemented, too soon to assess goals progression  [] Other:     ASSESSMENT:  Patient tolerated session very well. ROM continues to show improvement and is progressing well. Patient tolerated addition of leg press well with increased weight for other exercise. Patient is progressing well overall. Patient educated on load management during session.      Return to Play: (if applicable)   []  Stage 1: Intro to Strength   []  Stage 2: Dynamic Strength and Intro to Plyometrics   []  Stage 3: Advanced Plyometrics and Intro to Throwing   []  Stage 4: Sport specific Training/Return to Sport     []  Ready to Return to Play, [employer] Stages   []  Not Ready for Return to Sports   Comments:      Treatment/Activity Tolerance:  [x] Patient tolerated treatment well [] Patient limited by fatique  [] Patient limited by pain  [] Patient limited by other medical complications  [] Other:     Overall Progression Towards Functional goals/ Treatment Progress Update:  [x] Patient is progressing as expected towards functional goals listed. [] Progression is slowed due to complexities/Impairments listed. [] Progression has been slowed due to co-morbidities. [] Plan just implemented, too soon to assess goals progression <30days   [] Goals require adjustment due to lack of progress  [] Patient is not progressing as expected and requires additional follow up with physician  [] Other    Prognosis for POC: [x] Good [] Fair  [] Poor    Patient requires continued skilled intervention: [x] Yes  [] No      PLAN: Continue to progress per patient tolerance. [x] Continue per plan of care [] Alter current plan (see comments)  [] Plan of care initiated [] Hold pending MD visit [] Discharge    Electronically signed by: Mary Kay Anderson PT     Note: If patient does not return for scheduled/recommended follow up visits, this note will serve as a discharge from care along with the most recent update on progress.

## 2021-10-11 ENCOUNTER — HOSPITAL ENCOUNTER (OUTPATIENT)
Dept: PHYSICAL THERAPY | Age: 66
Setting detail: THERAPIES SERIES
Discharge: HOME | End: 2021-10-11
Payer: MEDICARE

## 2021-10-11 NOTE — PROGRESS NOTES
[facility]    Physical Therapy  Cancellation/No-show Note  Patient Name:  Shonda Dugan  :  1955   Date:  10/11/2021  Cancelled visits to date: 1  No-shows to date: 0    For today's appointment patient:  [x]  Cancelled  []  Rescheduled appointment  []  No-show     Reason given by patient:  []  Patient ill  [x]  Conflicting appointment  []  No transportation    []  Conflict with work  []  No reason given  []  Other:     Comments: Patient reports he has to go to cardiac testing today. Phone call information:   []  Phone call made today to patient at _ time at number provided:      []  Patient answered, conversation as follows:    []  Patient did not answer, message left as follows:  []  Phone call not made today  [x]  Phone call not needed - pt contacted us to cancel and provided reason for cancellation.      Electronically signed by:  Ivette Callaway, [id number]

## 2021-10-13 ENCOUNTER — OFFICE VISIT (OUTPATIENT)
Dept: ORTHOPEDIC SURGERY | Age: 66
End: 2021-10-13

## 2021-10-13 VITALS — WEIGHT: 230 LBS | BODY MASS INDEX: 32.93 KG/M2 | HEIGHT: 70 IN

## 2021-10-13 DIAGNOSIS — Z98.890 S/P ARTHROSCOPY OF RIGHT KNEE: Primary | ICD-10-CM

## 2021-10-13 PROCEDURE — 99024 POSTOP FOLLOW-UP VISIT: CPT | Performed by: ORTHOPAEDIC SURGERY

## 2021-10-13 NOTE — PROGRESS NOTES
History of Present Illness:  Bladimir Cuadra is a pleasant 61 y.o. male who presents for a post operative visit. He is 2.5 weeks out following a right knee arthroscopy, synovectomy, partial medial meniscectomy and chondroplasty PF/MFC (very limited). Overall He is doing great and is in PT [facility]. Walking upto 2-3 miles now. Some post walking soreness. No setbacks. He denies fevers, chills, numbness, tingling, and shortness of breath. Medical History:  Patient's medications, allergies, past medical, surgical, social and family histories were reviewed and updated as appropriate. No notes on file    Review of Systems  A 14 point review of systems was completed by the patient and is available in the media section of the scanned medical record and was reviewed on 10/13/2021. Vital Signs: There were no vitals filed for this visit. General/Appearance: Alert and oriented and in no apparent distress. Skin:  There are no skin lesions, cellulitis, or extreme edema. The patient has warm and well-perfused Bilateral lower  extremities with brisk capillary refill. Knee Exam:    Inspection: Knee incision(s)  are clean, dry and intact and well approximated. There is no erythema, drainage or other signs of infection    Palpation:  No crepitus to gentle motion of the knee    Active Range of Motion: Deferred    Passive Range of Motion: 5 to 115deg flexion arc with no pain    Strength:  Good isometric quads    Special Tests:  Negative mcmurrays. Neurovascular: Sensation to light touch is intact, no motor deficits, palpable radial pulses 2+    Radiology:       No new XR obtained at this time. Assessment :  Mr. Bladimir Cuadra is a pleasant 61 y.o. patient who is 2.5 weeks out following a right knee arthroscopy, synovectomy, partial medial meniscectomy and chondroplasty PF/MFC (very limited). No acute surgical concerns. Impression:  Encounter Diagnosis   Name Primary?     S/P arthroscopy of right knee Yes       Office Procedures:  No orders of the defined types were placed in this encounter. Treatment Plan:    Overall Kerry Maynard is doing well. The pain is well-controlled. We recommend that He continue with physical therapy for timeline based progression of motion and strengthening. Advised about natural healing process post surgery and to modify activities/walking based on symptoms. All of his questions were fully answered today. We would like to see Kerry Maynard back in 2 months for follow-up visit. Sincerely,    Marianna Rios MD [facility] and [address] [place] [address] [address], [address] [place] [address], [facility]  Email: [email or web address]  Office: [phone]    10/13/21  3:53 PM

## 2021-10-14 ENCOUNTER — HOSPITAL ENCOUNTER (OUTPATIENT)
Dept: PHYSICAL THERAPY | Age: 66
Setting detail: THERAPIES SERIES
Discharge: HOME | End: 2021-10-14
Payer: MEDICARE

## 2021-10-14 PROCEDURE — 97016 VASOPNEUMATIC DEVICE THERAPY: CPT

## 2021-10-14 PROCEDURE — 97110 THERAPEUTIC EXERCISES: CPT

## 2021-10-14 NOTE — FLOWSHEET NOTE
[facility]    Physical Therapy Treatment Note/ Progress Report:     Date:  10/14/2021    Patient Name:  Richard Griffin    :  1955  MRN: [id number]  Medical/Treatment Diagnosis Information:  · Diagnosis: U52.443R (ICD-10-CM) - Complex tear of medial meniscus of right knee as current injury, initial [id number] (ICD-10-CM) - Discoid lateral meniscus of right knee  · Treatment Diagnosis: Pain and functional limiitations of R knee  Insurance/Certification information:  PT Insurance Information: [employer]et or [facility]  Physician Information:  Referring Practitioner: Jama Velásquez MD  Plan of care signed (Y/N):     Date of Patient follow up with Physician:      Progress Report: []  Yes  []  No     Functional Scale: LEFS 33/80   Date: 21    Date Range for reporting period:  Beginnin21  Ending:      Progress report due (10 Rx/or 30 days whichever is less): 83/87/15     Recertification due (POC duration/ or 90 days whichever is less): 21     Visit # Insurance Allowable Auth Needed   6 (5 post-op) 40 []Yes    []No     Pain level:  1-4/10     SUBJECTIVE:  Patient reports he continues to ambulate for exercises and is doing well. Patient states today he is feeling some stiffness today, but overall is doing really well.      OBJECTIVE: See eval   Observation:    Test measurements:  10/14/21 PROM R knee flexion 120 deg, ext 0 deg    RESTRICTIONS/PRECAUTIONS: DOS 21 RIGHT KNEE ARTHROSCOPY, SYNOVECTOMY, PARTIAL MEDIAL MENISCECTOMY, POSSIBLE CHONDROPLASTY    Exercises/Interventions:   Therapeutic Ex 45' Wt / Resistance Sets/sec Reps Notes          Supine heel prop 3 lb 5 min     Heel slide   1 10 10' hold   Quad set   2 10 6' hold   Seated knee flex AAROM  1 10 10' sec hold   Sit to stand tap   2 To fatigue     Prone knee flexion stretch  4 30'     SAQ 5lb  2 8 5 sec hold    bike  5 min  ROM and warm up   LAQ 5b 2 8    Standing hip abduction/ext 5lb  1 8-10    Leg press 105 lb 2 8                     Therapeutic Activities 10'               Education on fall prevention, surgical expectations post-operatively, and gait training during session. Manual Intervention 10'             Knee and patellar Mobs    I-III significantly improved ROM during session. PROM MT    -4 knee ext and 100 deg following MT. NMR re-education                                                                   Therapeutic Exercise and NMR EXR  [x] (71162) Provided verbal/tactile cueing for activities related to strengthening, flexibility, endurance, ROM  for improvements in scapular, scapulothoracic and UE control with self care, reaching, carrying, lifting, house/yardwork, driving/computer work.    [] (94980) Provided verbal/tactile cueing for activities related to improving balance, coordination, kinesthetic sense, posture, motor skill, proprioception  to assist with  scapular, scapulothoracic and UE control with self care, reaching, carrying, lifting, house/yardwork, driving/computer work. Therapeutic Activities:    [x] (23601 or 93371) Provided verbal/tactile cueing for activities related to improving balance, coordination, kinesthetic sense, posture, motor skill, proprioception and motor activation to allow for proper function of scapular, scapulothoracic and UE control with self care, carrying, lifting, driving/computer work.      Home Exercise Program:    [x] (02981) Reviewed/Progressed HEP activities related to strengthening, flexibility, endurance, ROM of scapular, scapulothoracic and UE control with self care, reaching, carrying, lifting, house/yardwork, driving/computer work  [] (83682) Reviewed/Progressed HEP activities related to improving balance, coordination, kinesthetic sense, posture, motor skill, proprioception of scapular, scapulothoracic and UE control with self care, reaching, carrying, lifting, house/yardwork, driving/computer work      Manual Treatments:  PROM / STM / Oscillations-Mobs:  G-I, II, III, IV (Ethan, Inf., Post.)  [x] (60584) Provided manual therapy to mobilize soft tissue/joints of cervical/CT, scapular GHJ and UE for the purpose of modulating pain, promoting relaxation,  increasing ROM, reducing/eliminating soft tissue swelling/inflammation/restriction, improving soft tissue extensibility and allowing for proper ROM for normal function with self care, reaching, carrying, lifting, house/yardwork, driving/computer work    Modalities:  VASO 10 minutes to reduce postoperative pain and swelling./     Charges:  Timed Code Treatment Minutes: 45   Total Treatment Minutes: 55       [] EVAL (LOW) 42910 (typically 20 minutes face-to-face)  [] EVAL (MOD) 11187 (typically 30 minutes face-to-face)  [] EVAL (HIGH) 78448 (typically 45 minutes face-to-face)  [] RE-EVAL     [x] LO(95621) x   3  [] IONTO (12350)  [] NMR (59794) x     [x] VASO (08915)  [] Manual (08920) x      [] Other:  [] TA (77051)x     [] Mech Traction (32007)  [] ES(attended) (42521)     [] ES (un) (87229): If BWC Please Indicate Time In/Out and Total Minutes  CPT Code Time in Time out Total Min                                            GOALS:  Patient stated goal: To return to ambulation for exercise. []? Progressing: [x]? Met: []? Not Met: []? Adjusted     Therapist goals for Patient:   Short Term Goals: To be achieved in: 2 weeks  1. Independent in HEP and progression per patient tolerance, in order to prevent re-injury. [x]? Progressing: []? Met: []? Not Met: []? Adjusted  2. Patient will have a decrease in pain to facilitate improvement in movement, function, and ADLs as indicated by Functional Deficits. [x]? Progressing: []? Met: []? Not Met: []? Adjusted     Long Term Goals: To be achieved in: 8-10 weeks  1.  Disability index score of 35% or less for the LEFS to assist with reaching prior level of function. [x]? Progressing: []? Met: []? Not Met: []? Adjusted  2. Patient will demonstrate increased AROM to [facility] to allow for proper joint functioning as indicated by patients Functional Deficits. [x]? Progressing: []? Met: []? Not Met: []? Adjusted  3. Patient will demonstrate an increase in Strength to good proximal hip strength and control, within 5lb HHD in LE to allow for proper functional mobility as indicated by patients Functional Deficits. [x]? Progressing: []? Met: []? Not Met: []? Adjusted  4. Patient will return to pivoting, stair ascend/decend, prolonged walking, prolonged standing, squatting, and functional activities without increased symptoms or restriction. [x]? Progressing: []? Met: []? Not Met: []? Adjusted  5. To be able to walk at least 3 miles for exercise, weight loss and QOL. (patient specific functional goal)    [x]? Progressing: []? Met: []? Not Met: []? Adjusted   Progression Towards Functional goals:  [x] Patient is progressing as expected towards functional goals listed. [] Progression is slowed due to complexities listed. [] Progression has been slowed due to co-morbidities. [] Plan just implemented, too soon to assess goals progression  [] Other:     ASSESSMENT:  Patient tolerated session very well. ROM continues to show improvement and is progressing well. Patient is also tolerating strengthening well. Continue to progress per patient tolerance and manage loading. Patient is highly motivated to improve.       Return to Play: (if applicable)   []  Stage 1: Intro to Strength   []  Stage 2: Dynamic Strength and Intro to Plyometrics   []  Stage 3: Advanced Plyometrics and Intro to Throwing   []  Stage 4: Sport specific Training/Return to Sport     []  Ready to Return to Play, [employer]   []  Not Ready for Return to Sports   Comments:      Treatment/Activity Tolerance:  [x] Patient tolerated treatment well [] Patient limited by nadiya  [] Patient limited by pain  [] Patient limited by other medical complications  [] Other:     Overall Progression Towards Functional goals/ Treatment Progress Update:  [x] Patient is progressing as expected towards functional goals listed. [] Progression is slowed due to complexities/Impairments listed. [] Progression has been slowed due to co-morbidities. [] Plan just implemented, too soon to assess goals progression <30days   [] Goals require adjustment due to lack of progress  [] Patient is not progressing as expected and requires additional follow up with physician  [] Other    Prognosis for POC: [x] Good [] Fair  [] Poor    Patient requires continued skilled intervention: [x] Yes  [] No      PLAN: Continue to progress per patient tolerance. [x] Continue per plan of care [] Alter current plan (see comments)  [] Plan of care initiated [] Hold pending MD visit [] Discharge    Electronically signed by: Sherryn Dakins, PT     Note: If patient does not return for scheduled/recommended follow up visits, this note will serve as a discharge from care along with the most recent update on progress.

## 2021-10-21 ENCOUNTER — HOSPITAL ENCOUNTER (OUTPATIENT)
Dept: PHYSICAL THERAPY | Age: 66
Setting detail: THERAPIES SERIES
Discharge: HOME | End: 2021-10-21
Payer: MEDICARE

## 2021-10-21 PROCEDURE — 97110 THERAPEUTIC EXERCISES: CPT

## 2021-10-21 NOTE — FLOWSHEET NOTE
[facility]    Physical Therapy Treatment Note/ Progress Report:     Date:  10/21/2021    Patient Name:  Antonette Joseph    :  1955  MRN: [id number]  Medical/Treatment Diagnosis Information:  · Diagnosis: Z57.274S (ICD-10-CM) - Complex tear of medial meniscus of right knee as current injury, initial [id number] (ICD-10-CM) - Discoid lateral meniscus of right knee  · Treatment Diagnosis: Pain and functional limiitations of R knee  Insurance/Certification information:  PT Insurance Information: [employer]et or [facility]  Physician Information:  Referring Practitioner: Mayra Logan MD  Plan of care signed (Y/N):     Date of Patient follow up with Physician:      Progress Report: []  Yes  []  No     Functional Scale: LEFS 3380   Date: 21    Date Range for reporting period:  Beginnin21  Ending:      Progress report due (10 Rx/or 30 days whichever is less):      Recertification due (POC duration/ or 90 days whichever is less): 21     Visit # Insurance Allowable Auth Needed   7 (6 post-op) 40 []Yes    []No     Pain level:  1-410     SUBJECTIVE:  Patient reports he continues to ambulate for exercises and is doing well. Patient states he occasionally feels a sharpness in the medial anterior R knee that goes away quickly. Patient reports swelling has been well controlled.      OBJECTIVE: See eval   Observation:    Test measurements:  10/14/21 PROM R knee flexion 120 deg, ext 0 deg    RESTRICTIONS/PRECAUTIONS: DOS 21 RIGHT KNEE ARTHROSCOPY, SYNOVECTOMY, PARTIAL MEDIAL MENISCECTOMY, POSSIBLE CHONDROPLASTY    Exercises/Interventions:   Therapeutic Ex 45' Wt / Resistance Sets/sec Reps Notes          Supine heel prop 3 lb 5 min     Heel slide   1 10 10' hold   Quad set   2 10 6' hold   Seated knee flex AAROM  1 10 10' sec hold   bridges  2 10 5 sec hold    Sit to stand tap   2 To fatigue     Prone knee flexion stretch  4 30' SLR 3 2 8-10    SAQ 5lb  2 8 5 sec hold    bike  5 min  ROM and warm up   LAQ 5b 2 8    Standing hip abduction/ext 5lb  1 8-10    4in lateral step up  2 15    Leg ext mach 20 2 8    Leg press 160 lb 2 8                     Therapeutic Activities 10'               Education on fall prevention, surgical expectations post-operatively, and gait training during session. Manual Intervention 10'             Knee and patellar Mobs    I-III significantly improved ROM during session. PROM MT    -4 knee ext and 100 deg following MT. NMR re-education                                                                   Therapeutic Exercise and NMR EXR  [x] (84189) Provided verbal/tactile cueing for activities related to strengthening, flexibility, endurance, ROM  for improvements in scapular, scapulothoracic and UE control with self care, reaching, carrying, lifting, house/yardwork, driving/computer work.    [] (67340) Provided verbal/tactile cueing for activities related to improving balance, coordination, kinesthetic sense, posture, motor skill, proprioception  to assist with  scapular, scapulothoracic and UE control with self care, reaching, carrying, lifting, house/yardwork, driving/computer work. Therapeutic Activities:    [x] (95195 or 93419) Provided verbal/tactile cueing for activities related to improving balance, coordination, kinesthetic sense, posture, motor skill, proprioception and motor activation to allow for proper function of scapular, scapulothoracic and UE control with self care, carrying, lifting, driving/computer work.      Home Exercise Program:    [x] (31977) Reviewed/Progressed HEP activities related to strengthening, flexibility, endurance, ROM of scapular, scapulothoracic and UE control with self care, reaching, carrying, lifting, house/yardwork, driving/computer work  [] (83356) Reviewed/Progressed HEP activities related to improving balance, coordination, kinesthetic sense, posture, motor skill, proprioception of scapular, scapulothoracic and UE control with self care, reaching, carrying, lifting, house/yardwork, driving/computer work      Manual Treatments:  PROM / STM / Oscillations-Mobs:  G-I, II, III, IV (PA's, Inf., Post.)  [x] (48488) Provided manual therapy to mobilize soft tissue/joints of cervical/CT, scapular GHJ and UE for the purpose of modulating pain, promoting relaxation,  increasing ROM, reducing/eliminating soft tissue swelling/inflammation/restriction, improving soft tissue extensibility and allowing for proper ROM for normal function with self care, reaching, carrying, lifting, house/yardwork, driving/computer work    Modalities:  VASO 10 minutes to reduce postoperative pain and swelling./     Charges:  Timed Code Treatment Minutes: 45   Total Treatment Minutes: 45       [] EVAL (LOW) 78753 (typically 20 minutes face-to-face)  [] EVAL (MOD) 87933 (typically 30 minutes face-to-face)  [] EVAL (HIGH) 08474 (typically 45 minutes face-to-face)  [] RE-EVAL     [x] WZ(24416) x   3  [] IONTO (28118)  [] NMR (02118) x     [] VASO (46179)  [] Manual (95202) x      [] Other:  [] TA (16479)x     [] Mech Traction (97025)  [] ES(attended) (98048)     [] ES (un) (18675): If BWC Please Indicate Time In/Out and Total Minutes  CPT Code Time in Time out Total Min                                            GOALS:  Patient stated goal: To return to ambulation for exercise. []? Progressing: [x]? Met: []? Not Met: []? Adjusted     Therapist goals for Patient:   Short Term Goals: To be achieved in: 2 weeks  1. Independent in HEP and progression per patient tolerance, in order to prevent re-injury. [x]? Progressing: []? Met: []? Not Met: []? Adjusted  2. Patient will have a decrease in pain to facilitate improvement in movement, function, and ADLs as indicated by Functional Deficits. [x]? Progressing: []?  Met: []? Not Met: []? Adjusted     Long Term Goals: To be achieved in: 8-10 weeks  1. Disability index score of 35% or less for the LEFS to assist with reaching prior level of function. [x]? Progressing: []? Met: []? Not Met: []? Adjusted  2. Patient will demonstrate [facility] to allow for proper joint functioning as indicated by patients Functional Deficits. [x]? Progressing: []? Met: []? Not Met: []? Adjusted  3. Patient will demonstrate an increase in Strength to good proximal hip strength and control, within 5lb HHD in LE to allow for proper functional mobility as indicated by patients Functional Deficits. [x]? Progressing: []? Met: []? Not Met: []? Adjusted  4. Patient will return to pivoting, stair ascend/decend, prolonged walking, prolonged standing, squatting, and functional activities without increased symptoms or restriction. [x]? Progressing: []? Met: []? Not Met: []? Adjusted  5. To be able to walk at least 3 miles for exercise, weight loss and QOL. (patient specific functional goal)    [x]? Progressing: []? Met: []? Not Met: []? Adjusted   Progression Towards Functional goals:  [x] Patient is progressing as expected towards functional goals listed. [] Progression is slowed due to complexities listed. [] Progression has been slowed due to co-morbidities. [] Plan just implemented, too soon to assess goals progression  [] Other:     ASSESSMENT:  Patient tolerated session very well. ROM maintained and patient is tolerating strengthening well. Patient is doing well overall and would continue to benefit from skilled intervention to improve LE strength and balance.      Return to Play: (if applicable)   []  Stage 1: Intro to Strength   []  Stage 2: Dynamic Strength and Intro to Plyometrics   []  Stage 3: Advanced Plyometrics and Intro to Throwing   []  Stage 4: Sport specific Training/Return to Sport     []  Ready to Return to Play, Meets [employer] Stages   []  Not Ready for Return to Sports   Comments:      Treatment/Activity Tolerance:  [x] Patient tolerated treatment well [] Patient limited by fatique  [] Patient limited by pain  [] Patient limited by other medical complications  [] Other:     Overall Progression Towards Functional goals/ Treatment Progress Update:  [x] Patient is progressing as expected towards functional goals listed. [] Progression is slowed due to complexities/Impairments listed. [] Progression has been slowed due to co-morbidities. [] Plan just implemented, too soon to assess goals progression <30days   [] Goals require adjustment due to lack of progress  [] Patient is not progressing as expected and requires additional follow up with physician  [] Other    Prognosis for POC: [x] Good [] Fair  [] Poor    Patient requires continued skilled intervention: [x] Yes  [] No      PLAN: Continue to progress per patient tolerance. [x] Continue per plan of care [] Alter current plan (see comments)  [] Plan of care initiated [] Hold pending MD visit [] Discharge    Electronically signed by: Smita Olivarez PT     Note: If patient does not return for scheduled/recommended follow up visits, this note will serve as a discharge from care along with the most recent update on progress.

## 2021-10-25 ENCOUNTER — HOSPITAL ENCOUNTER (OUTPATIENT)
Dept: PHYSICAL THERAPY | Age: 66
Setting detail: THERAPIES SERIES
Discharge: HOME | End: 2021-10-25
Payer: MEDICARE

## 2021-10-25 PROCEDURE — 97110 THERAPEUTIC EXERCISES: CPT

## 2021-10-25 NOTE — FLOWSHEET NOTE
MENISCECTOMY, POSSIBLE CHONDROPLASTY    Exercises/Interventions:   Therapeutic Ex 39' Wt / Resistance Sets/sec Reps Notes          Supine heel prop 3 lb 5 min     Heel slide   1 10 10' hold   Quad set   2 10 6' hold   Seated knee flex AAROM  1 10 10' sec hold   bridges  2 10 5 sec hold    Sit to stand tap   2 To fatigue     Prone knee flexion stretch  4 30'     SLR 3 2 8-10    SAQ 5lb  2 8 5 sec hold    bike  5 min  ROM and warm up   LAQ 5b 2 8    Standing hip abduction/ext 5lb  1 8-10    4in lateral step up  2 15    MH adb 105 2 10    Leg ext mach 20 2 8    Leg press  lb 2 8    Leg press SL 85 2 8-10              Therapeutic Activities 10'               Education on fall prevention, surgical expectations post-operatively, and gait training during session. Manual Intervention 10'             Knee and patellar Mobs    I-III significantly improved ROM during session. PROM MT    -4 knee ext and 100 deg following MT. NMR re-education                                                                   Therapeutic Exercise and NMR EXR  [x] (84263) Provided verbal/tactile cueing for activities related to strengthening, flexibility, endurance, ROM  for improvements in scapular, scapulothoracic and UE control with self care, reaching, carrying, lifting, house/yardwork, driving/computer work.    [] (10957) Provided verbal/tactile cueing for activities related to improving balance, coordination, kinesthetic sense, posture, motor skill, proprioception  to assist with  scapular, scapulothoracic and UE control with self care, reaching, carrying, lifting, house/yardwork, driving/computer work.     Therapeutic Activities:    [x] (35043 or 20229) Provided verbal/tactile cueing for activities related to improving balance, coordination, kinesthetic sense, posture, motor skill, proprioception and motor activation to allow for proper function of scapular, scapulothoracic and UE control with self care, carrying, lifting, driving/computer work. Home Exercise Program:    [x] (77119) Reviewed/Progressed HEP activities related to strengthening, flexibility, endurance, ROM of scapular, scapulothoracic and UE control with self care, reaching, carrying, lifting, house/yardwork, driving/computer work  [] (96826) Reviewed/Progressed HEP activities related to improving balance, coordination, kinesthetic sense, posture, motor skill, proprioception of scapular, scapulothoracic and UE control with self care, reaching, carrying, lifting, house/yardwork, driving/computer work      Manual Treatments:  PROM / STM / Oscillations-Mobs:  G-I, II, III, IV (PA's, Inf., Post.)  [x] (37108) Provided manual therapy to mobilize soft tissue/joints of cervical/CT, scapular GHJ and UE for the purpose of modulating pain, promoting relaxation,  increasing ROM, reducing/eliminating soft tissue swelling/inflammation/restriction, improving soft tissue extensibility and allowing for proper ROM for normal function with self care, reaching, carrying, lifting, house/yardwork, driving/computer work    Modalities:  VASO 10 minutes to reduce postoperative pain and swelling./     Charges:  Timed Code Treatment Minutes: 45   Total Treatment Minutes: 45       [] EVAL (LOW) 36355 (typically 20 minutes face-to-face)  [] EVAL (MOD) 41947 (typically 30 minutes face-to-face)  [] EVAL (HIGH) 37662 (typically 45 minutes face-to-face)  [] RE-EVAL     [x] ZM(86619) x   3  [] IONTO (54044)  [] NMR (48743) x     [] VASO (06033)  [] Manual (97266) x      [] Other:  [] TA (20936)x     [] Mech Traction (40096)  [] ES(attended) (69509)     [] ES (un) (02694): If BWC Please Indicate Time In/Out and Total Minutes  CPT Code Time in Time out Total Min                                            GOALS:  Patient stated goal: To return to ambulation for exercise. []? Progressing: [x]? Met: []?  Not Met: []? Adjusted     Therapist goals for Patient:   Short Term Goals: To be achieved in: 2 weeks  1. Independent in HEP and progression per patient tolerance, in order to prevent re-injury. []? Progressing: [x]? Met: []? Not Met: []? Adjusted  2. Patient will have a decrease in pain to facilitate improvement in movement, function, and ADLs as indicated by Functional Deficits. []? Progressing: [x]? Met: []? Not Met: []? Adjusted     Long Term Goals: To be achieved in: 8-10 weeks  1. Disability index score of 35% or less for the LEFS to assist with reaching prior level of function. [x]? Progressing: []? Met: []? Not Met: []? Adjusted  2. Patient will demonstrate increased AROM to [facility] to allow for proper joint functioning as indicated by patients Functional Deficits. []? Progressing: [x]? Met: []? Not Met: []? Adjusted  3. Patient will demonstrate an increase in Strength to good proximal hip strength and control, within 5lb HHD in LE to allow for proper functional mobility as indicated by patients Functional Deficits. [x]? Progressing: []? Met: []? Not Met: []? Adjusted  4. Patient will return to pivoting, stair ascend/decend, prolonged walking, prolonged standing, squatting, and functional activities without increased symptoms or restriction. [x]? Progressing: []? Met: []? Not Met: []? Adjusted  5. To be able to walk at least 3 miles for exercise, weight loss and QOL. (patient specific functional goal)    []? Progressing: [x]? Met: []? Not Met: []? Adjusted   Progression Towards Functional goals:  [x] Patient is progressing as expected towards functional goals listed. [] Progression is slowed due to complexities listed. [] Progression has been slowed due to co-morbidities. [] Plan just implemented, too soon to assess goals progression  [] Other:     ASSESSMENT:  Patient tolerated session very well.  Patient continues to show progress on strength, ROM, and function that has greatly improved since initial evaluation. Patient reports that PD does limit him functionally more so than R knee deficits. Patient encouraged to continue with HEP to improve strength and functional tolerance. Return to Play: (if applicable)   []  Stage 1: Intro to Strength   []  Stage 2: Dynamic Strength and Intro to Plyometrics   []  Stage 3: Advanced Plyometrics and Intro to Throwing   []  Stage 4: Sport specific Training/Return to Sport     []  Ready to Return to Play, [employer]   []  Not Ready for Return to Sports   Comments:      Treatment/Activity Tolerance:  [x] Patient tolerated treatment well [] Patient limited by fatique  [] Patient limited by pain  [] Patient limited by other medical complications  [] Other:     Overall Progression Towards Functional goals/ Treatment Progress Update:  [x] Patient is progressing as expected towards functional goals listed. [] Progression is slowed due to complexities/Impairments listed. [] Progression has been slowed due to co-morbidities. [] Plan just implemented, too soon to assess goals progression <30days   [] Goals require adjustment due to lack of progress  [] Patient is not progressing as expected and requires additional follow up with physician  [] Other    Prognosis for POC: [x] Good [] Fair  [] Poor    Patient requires continued skilled intervention: [x] Yes  [] No      PLAN: Continue to progress per patient tolerance. [x] Continue per plan of care [] Alter current plan (see comments)  [] Plan of care initiated [] Hold pending MD visit [] Discharge    Electronically signed by: Zan Mims, PT     Note: If patient does not return for scheduled/recommended follow up visits, this note will serve as a discharge from care along with the most recent update on progress.

## 2021-10-27 ENCOUNTER — APPOINTMENT (OUTPATIENT)
Dept: PHYSICAL THERAPY | Age: 66
End: 2021-10-27
Payer: MEDICARE

## 2021-11-03 DIAGNOSIS — E78.2 MIXED HYPERLIPIDEMIA: ICD-10-CM

## 2021-11-03 RX ORDER — ICOSAPENT ETHYL 1000 MG/1
CAPSULE ORAL
Qty: 180 CAPSULE | Refills: 0 | Status: SHIPPED | OUTPATIENT
Start: 2021-11-03 | End: 2022-01-11

## 2021-11-03 NOTE — TELEPHONE ENCOUNTER
45 day supply pended to last til appt.      Requested Prescriptions     Pending Prescriptions Disp Refills    Icosapent Ethyl (VASCEPA) 1 g CAPS capsule 180 capsule 0     Sig: TAKE 2 CAPSULES BY MOUTH TWO TIMES A DAY

## 2021-11-19 DIAGNOSIS — E55.9 VITAMIN D DEFICIENCY: ICD-10-CM

## 2021-11-19 DIAGNOSIS — E78.2 MIXED HYPERLIPIDEMIA: ICD-10-CM

## 2021-11-19 DIAGNOSIS — E23.0 HYPOGONADOTROPIC HYPOGONADISM (HCC): ICD-10-CM

## 2021-11-19 DIAGNOSIS — E11.40 TYPE 2 DIABETES, CONTROLLED, WITH NEUROPATHY (HCC): ICD-10-CM

## 2021-11-19 LAB
A/G RATIO: 2.8 (ref 1.1–2.2)
ALBUMIN SERPL-MCNC: 5.3 G/DL (ref 3.4–5)
ALP BLD-CCNC: 69 U/L (ref 40–129)
ALT SERPL-CCNC: 14 U/L (ref 10–40)
ANION GAP SERPL CALCULATED.3IONS-SCNC: 16 MMOL/L (ref 3–16)
AST SERPL-CCNC: 19 U/L (ref 15–37)
BILIRUB SERPL-MCNC: <0.2 MG/DL (ref 0–1)
BUN BLDV-MCNC: 21 MG/DL (ref 7–20)
CALCIUM SERPL-MCNC: 9.7 MG/DL (ref 8.3–10.6)
CHLORIDE BLD-SCNC: 100 MMOL/L (ref 99–110)
CHOLESTEROL, TOTAL: 133 MG/DL (ref 0–199)
CO2: 22 MMOL/L (ref 21–32)
CREAT SERPL-MCNC: 0.7 MG/DL (ref 0.8–1.3)
CREATININE URINE: 58.3 MG/DL (ref 39–259)
GFR AFRICAN AMERICAN: >60
GFR NON-AFRICAN AMERICAN: >60
GLUCOSE BLD-MCNC: 114 MG/DL (ref 70–99)
HDLC SERPL-MCNC: 28 MG/DL (ref 40–60)
LDL CHOLESTEROL CALCULATED: 57 MG/DL
MICROALBUMIN UR-MCNC: <1.2 MG/DL
MICROALBUMIN/CREAT UR-RTO: NORMAL MG/G (ref 0–30)
POTASSIUM SERPL-SCNC: 4.5 MMOL/L (ref 3.5–5.1)
SODIUM BLD-SCNC: 138 MMOL/L (ref 136–145)
T3 FREE: 2.9 PG/ML (ref 2.3–4.2)
T4 FREE: 1.5 NG/DL (ref 0.9–1.8)
TOTAL PROTEIN: 7.2 G/DL (ref 6.4–8.2)
TRIGL SERPL-MCNC: 238 MG/DL (ref 0–150)
TSH SERPL DL<=0.05 MIU/L-ACNC: 1.12 UIU/ML (ref 0.27–4.2)
VITAMIN D 25-HYDROXY: 52.3 NG/ML
VLDLC SERPL CALC-MCNC: 48 MG/DL

## 2021-11-20 LAB
ESTIMATED AVERAGE GLUCOSE: 137 MG/DL
HBA1C MFR BLD: 6.4 %

## 2021-11-30 DIAGNOSIS — E78.2 MIXED HYPERLIPIDEMIA: Primary | ICD-10-CM

## 2021-11-30 RX ORDER — LISINOPRIL 10 MG/1
TABLET ORAL
Qty: 90 TABLET | Refills: 0 | Status: SHIPPED | OUTPATIENT
Start: 2021-11-30 | End: 2022-03-02

## 2021-12-04 DIAGNOSIS — E78.2 MIXED HYPERLIPIDEMIA: ICD-10-CM

## 2021-12-06 ENCOUNTER — OFFICE VISIT (OUTPATIENT)
Dept: ENDOCRINOLOGY | Age: 66
End: 2021-12-06
Payer: MEDICARE

## 2021-12-06 VITALS
HEIGHT: 70 IN | OXYGEN SATURATION: 95 % | HEART RATE: 65 BPM | BODY MASS INDEX: 33.21 KG/M2 | SYSTOLIC BLOOD PRESSURE: 132 MMHG | WEIGHT: 232 LBS | DIASTOLIC BLOOD PRESSURE: 77 MMHG

## 2021-12-06 DIAGNOSIS — Z79.4 CONTROLLED TYPE 2 DIABETES MELLITUS WITH DIABETIC NEPHROPATHY, WITH LONG-TERM CURRENT USE OF INSULIN (HCC): ICD-10-CM

## 2021-12-06 DIAGNOSIS — E11.40 TYPE 2 DIABETES, CONTROLLED, WITH NEUROPATHY (HCC): Primary | ICD-10-CM

## 2021-12-06 DIAGNOSIS — E11.21 CONTROLLED TYPE 2 DIABETES MELLITUS WITH DIABETIC NEPHROPATHY, WITH LONG-TERM CURRENT USE OF INSULIN (HCC): ICD-10-CM

## 2021-12-06 DIAGNOSIS — E78.2 MIXED HYPERLIPIDEMIA: ICD-10-CM

## 2021-12-06 DIAGNOSIS — E55.9 VITAMIN D DEFICIENCY: ICD-10-CM

## 2021-12-06 DIAGNOSIS — E04.9 GOITER: ICD-10-CM

## 2021-12-06 DIAGNOSIS — E23.0 HYPOGONADOTROPIC HYPOGONADISM (HCC): ICD-10-CM

## 2021-12-06 DIAGNOSIS — E66.9 CLASS 1 OBESITY WITH SERIOUS COMORBIDITY AND BODY MASS INDEX (BMI) OF 33.0 TO 33.9 IN ADULT, UNSPECIFIED OBESITY TYPE: ICD-10-CM

## 2021-12-06 PROCEDURE — 99214 OFFICE O/P EST MOD 30 MIN: CPT | Performed by: INTERNAL MEDICINE

## 2021-12-06 RX ORDER — FENOFIBRATE 160 MG/1
TABLET ORAL
Qty: 90 TABLET | Refills: 0 | Status: SHIPPED | OUTPATIENT
Start: 2021-12-06 | End: 2022-03-02

## 2021-12-06 NOTE — PROGRESS NOTES
Khadijah Gutierrez is a 61 y.o. male who presents for Type 2 diabetes mellitus. Current symptoms/problems include hyperglycemia and is unchanged. 1.  Type 2 diabetes, controlled, with neuropathy ([address] [E11.40]    Diagnosed with Type 2 diabetes mellitus in 2008.     Comorbid conditions: hyperlipidemia and Neuropathy    Current diabetic medications include: metformin, Januvia, Jardiance    Intolerance to diabetes medications: No     Weight trend: stable  Prior visit with dietician: yes  Current diet: on average, 3 meals per day  Current exercise: frequent     Current monitoring regimen: home blood tests - 1 times daily   Has brought blood glucose log/meter:  No  Home blood sugar records: fasting range:   and postprandial range:   Any episodes of hypoglycemia? Yes  Hypoglycemia frequency and time(s):  Very rare  Does patient have Glucagon emergency kit? No  Does patient have rapid acting carbohydrate? No  Does patient wear a medic alert bracelet or necklace? No    2. Mixed hyperlipidemia  No muscle pain. Has cramps. 3. Hypogonadotropic hypogonadism (HCC)  Has weakness. 4. Vitamin D deficiency  No bone pain. 5. Obesity  Eats healthy, exercises. 6. Goiter  Has difficulty swallowing. Has hoarseness. Aunt had thyroid cancer  No radiation to his neck.     Lab Results   Component Value Date    LABA1C 6.4 11/19/2021     Lab Results   Component Value Date    .0 11/19/2021         Past Medical History:   Diagnosis Date    Bilateral swelling of feet     Generalized headaches     Goiter     Hemorrhoids     Hiatal hernia     Hyperlipidemia     Impaired fasting glucose     Neuropathy     weakness bilat LE's worse Right    Other and unspecified anterior pituitary hyperfunction     Other anterior pituitary disorders     Other testicular hypofunction     Other testicular hypofunction     Parkinson's disease     Reflux     Sarcoidosis       Patient Active Problem List   Diagnosis  Mixed hyperlipidemia    Diabetic polyneuropathy (HCC)    Hypogonadotropic hypogonadism (HCC)    Type 2 diabetes, controlled, with neuropathy ([address]    Parkinson's disease ([address]    Atypical parkinsonism ([address]    Proteinuria    Vitamin D deficiency    Class 1 obesity with body mass index (BMI) of 33.0 to 33.9 in adult    Controlled type 2 diabetes mellitus with diabetic nephropathy, with long-term current use of insulin ([address]    Goiter     Past Surgical History:   Procedure Laterality Date    BRAIN SURGERY      brainstem (pt denies having brain surgery)    CATARACT REMOVAL  12-10-14    COLONOSCOPY  ca 2009    no polyps.      EXTERNAL EAR SURGERY Bilateral     EYE SURGERY      FOOT SURGERY      KNEE ARTHROSCOPY Right 9/24/2021    RIGHT KNEE ARTHROSCOPY, SYNOVECTOMY, PARTIAL MEDIAL MENISCECTOMY, CHONDROPLASTY performed by Naya Monsivais MD at [place] [address]      Skull repair as child top of head indented now    SKIN BIOPSY      SKIN BIOPSY      TONSILLECTOMY      UMBILICAL HERNIA REPAIR  2/68/93    umbillical hernia repair with mesh     Social History     Socioeconomic History    Marital status:      Spouse name: Not on file    Number of children: 2    Years of education: Not on file    Highest education level: Not on file   Occupational History    Occupation: retired    Tobacco Use    Smoking status: Never Smoker    Smokeless tobacco: Never Used   Vaping Use    Vaping Use: Never used   Substance and Sexual Activity    Alcohol use: No    Drug use: No    Sexual activity: Yes     Partners: Female   Other Topics Concern    Not on file   Social History Narrative    Not on file     Social Determinants of Health     Financial Resource Strain:     Difficulty of Paying Living Expenses: Not on file   Food Insecurity:     Worried About [address] in the Last Year: Not on file    Shelley of Food in the Last Year: Not on NICHOLAS Arizmendi Needs:     Lack of Transportation (Medical): Not on file    Lack of Transportation (Non-Medical):  Not on file   Physical Activity:     Days of Exercise per Week: Not on file    Minutes of Exercise per Session: Not on file   Stress:     Feeling of Stress : Not on file   Social Connections:     Frequency of Communication with Friends and Family: Not on file    Frequency of Social Gatherings with Friends and Family: Not on file    Attends Islam Services: Not on file    Active Member of Clubs or Organizations: Not on file    Attends Club or Organization Meetings: Not on file    Marital Status: Not on file   Intimate Partner Violence:     Fear of Current or Ex-Partner: Not on file    Emotionally Abused: Not on file    Physically Abused: Not on file    Sexually Abused: Not on file   Housing Stability:     Unable to Pay for Housing in the Last Year: Not on file    Number of Places Lived in the Last Year: Not on file    Unstable Housing in the Last Year: Not on file     Family History   Problem Relation Age of Onset    Diabetes Mother     Thyroid Disease Paternal Aunt     Cancer Paternal Aunt     Diabetes Sister     Thyroid Disease Sister     Stroke Brother     Diabetes Maternal Aunt     Diabetes Maternal Uncle     Diabetes Maternal Grandmother     Diabetes Maternal Grandfather     High Blood Pressure Neg Hx     Heart Disease Neg Hx      Current Outpatient Medications   Medication Sig Dispense Refill    fenofibrate (TRIGLIDE) 160 MG tablet TAKE 1 TABLET BY MOUTH EVERY DAY 90 tablet 0    lisinopril (PRINIVIL;ZESTRIL) 10 MG tablet TAKE 1 TABLET BY MOUTH EVERY DAY 90 tablet 0    Icosapent Ethyl (VASCEPA) 1 g CAPS capsule TAKE 2 CAPSULES BY MOUTH TWO TIMES A  capsule 0    JARDIANCE 25 MG tablet TAKE 1 TABLET BY MOUTH EVERY DAY  90 tablet 0    SITagliptin (JANUVIA) 100 MG tablet TAKE 1 TABLET BY MOUTH EVERY DAY 90 tablet 0    rosuvastatin (CRESTOR) 20 MG tablet TAKE 1 TABLET BY MOUTH ONE TIME A DAY  90 tablet 0    metFORMIN (GLUCOPHAGE) 1000 MG tablet TAKE 1 TABLET BY MOUTH TWO TIMES A DAY WITH MEALS 180 tablet 0    Lancets (ONETOUCH DELICA PLUS [id number]) MISC USE TO TEST BLOOD SUGAR FOUR TIMES A  each 0    Cholecalciferol (VITAMIN D3) 125 MCG (5000 UT) TABS TAKE 1 TABLET BY MOUTH FOUR DAYS A WEEK AND TAKE 2 TABLETS BY MOUTH 3 DAYS A WEEK 150 tablet 0    hydroxychloroquine (PLAQUENIL) 200 MG tablet TAKE 2 TABLETS BY MOUTH ONE TIME A DAY with breakfast      ONE TOUCH ULTRA TEST strip 4 times daily. 400 strip 3    Syringe, Disposable, 3 ML MISC Once every 2 weeks 100 each 3    Lancets (ONETOUCH DELICA PLUS [id number]) MISC use to test blood sugar 4 times daily 100 each 0    BREO ELLIPTA 200-25 MCG/INH AEPB INHALE 1 PUFF BY MOUTH ONE TIME A DAY  11    Blood Glucose Monitoring Suppl (ONE TOUCH ULTRA 2) w/Device KIT To check blood glucose 4 times daily. 1 kit 0    FREESTYLE LANCETS MISC 1 Units by Does not apply route 4 times daily 400 each 5    Blood Glucose Monitoring Suppl (FREESTYLE LITE) LIDIA 1 Device by Does not apply route 4 times daily 1 Device 0    propranolol (INDERAL) 80 MG tablet Take 80 mg by mouth daily       Probiotic Product (PROBIOTIC-10 PO) Take 10 mg by mouth daily       carbidopa-levodopa (SINEMET)  MG per tablet Take 2 tablets by mouth 4 times daily 1.5 tablets       aspirin EC 81 MG EC tablet Take 1 tablet by mouth 2 times daily for 14 days 28 tablet 0    naproxen (NAPROSYN) 500 MG tablet Take 1 tablet by mouth 2 times daily (with meals) for 14 days 28 tablet 0    lansoprazole (PREVACID) 15 MG delayed release capsule Take 2 capsules by mouth daily for 14 days 30 capsule 0    testosterone cypionate (DEPOTESTOTERONE CYPIONATE) 200 MG/ML injection INJECT 1 ML INTO THE MUSCLE EVERY 14 DAYS 10 mL 1     No current facility-administered medications for this visit.      No Known Allergies  Family Status   Relation Name Status    Mother      Father     [address]  (Not Specified)    Sister  (Not Specified)    Brother  (Not Specified)    MAunt  (Not Specified)    MUnc  (Not Specified)    MGM  (Not Specified)    MGF  (Not Specified)    Neg Hx  (Not Specified)       Review of Systems  Constitutional: has fatigue, no fever, no recent weight gain, has recent weight loss, no changes in appetite  Eyes: no eye pain, no change in vision, no eye redness, no eye irritation, no double vision  Ears, nose, throat: no nasal congestion, no sore throat, no earache, no decrease in hearing, no hoarseness, no dry mouth, no sinus problems, has difficulty swallowing, no neck lumps, no dental problems, no mouth sores, no ringing in ears  Pulmonary: no shortness of breath, no wheezing, no dyspnea on exertion, no cough  Cardiovascular: no chest pain, no lower extremity edema, no orthopnea, no intermittent leg claudication, no palpitations  Gastrointestinal: no abdominal pain, no nausea, no vomiting, no diarrhea, no constipation, no dysphagia, no heartburn, no bloating  Genitourinary: no dysuria, no urinary incontinence, no urinary hesitancy, no urinary frequency, no feelings of urinary urgency, no nocturia  Musculoskeletal: no joint swelling, no joint stiffness, has joint pain, has muscle cramps, has muscle pain, no bone pain  Integument/Breast: no skin rashes, no skin lesions, no itching, no dry skin, no breast pain, no breast mass, no skin hives, no skin discoloration, no nipple discharge  Neurological: no numbness, no tingling, no weakness, no confusion, has headaches, has dizziness, no fainting, has tremors, has decrease in memory, has balance problems  Psychiatric: has anxiety, no depression, has insomnia  Hematologic/Lymphatic: no tendency for easy bleeding, no swollen lymph nodes, no tendency for easy bruising  Immunology: no seasonal allergies, no frequent infections, no frequent illnesses  Endocrine: no temperature intolerance     OBJECTIVE:  /77 Pulse 65   Ht 5' 10\" (1.778 m)   Wt 232 lb (105.2 kg)   SpO2 95%   BMI 33.29 kg/m²      Wt Readings from Last 3 Encounters:   12/06/21 232 lb (105.2 kg)   10/13/21 230 lb (104.3 kg)   09/27/21 230 lb (104.3 kg)         Constitutional: no acute distress, well appearing and well nourished  Psychiatric: oriented to person, place and time, judgement and insight and normal, recent and remote memory and intact and mood and affect are normal  Skin: skin and subcutaneous tissue is normal without mass, normal turgor  Head and Face: examination of head and face revealed no abnormalities  Eyes: no lid or conjunctival swelling, erythema or discharge, pupils are normal, equal, round, reactive to light  Ears/Nose: external inspection of ears and nose revealed no abnormalities, hearing is grossly normal  Oropharynx/Mouth/Face: lips, tongue and gums are normal with no lesions, the voice quality was normal  Neck: neck is supple and symmetric, with midline trachea and no masses, thyroid is enlarged  Lymphatics: normal cervical lymph nodes, normal supraclavicular nodes  Pulmonary: no increased work of breathing or signs of respiratory distress, lungs are clear to auscultation  Cardiovascular: normal heart rate and rhythm, normal S1 and S2, no murmurs and pedal pulses and 2+ bilaterally, no edema  Abdomen: abdomen is soft, non-tender with no masses  Musculoskeletal: normal gait and station and exam of the digits and nails are normal  Neurological: normal coordination and normal general cortical function      ASSESSMENT/PLAN:  1. Type 2 diabetes, controlled, with neuropathy (HCC)  HbA1C 13.1-7.6-6.3-6.4  - Jardiance 25 mg daily. - Januvia 100 mg  - metFORMIN (GLUCOPHAGE) 1000 MG tablet; Take 1 tablet by mouth 2 times daily (with meals)   - Hemoglobin A1C; Future  - Comprehensive Metabolic Panel; Future  - Microalbumin / Creatinine Urine Ratio; Future    2.  Mixed hyperlipidemia  -[id number]  TG [id number]  HDL 30-18-48-34-28  Rosuvastatin 20 mg  Improved. Diet. Patient has muscle weakness, pain and cramping. Has Parkinson's.  - fenofibrate 160 MG tablet; Take 1 tablet by mouth daily    - Vascepa 2 caps bid  - Lipid Panel; Future    3. Hypogonadotropic hypogonadism (HCC)  Uncontrolled. Forgets at times. Testosterone [phone]  - testosterone cypionate (DEPOTESTOTERONE CYPIONATE) 200 MG/ML injection; Inject 1 mL into the muscle every 14 days. - CBC; Future  - Testosterone, free, total; Future    4. Vitamin D deficiency  25OH vit D 40-31-67.7-52.3  Supplements 5000 IU daily. - Vitamin D 25 Hydroxy; Future    5. Obesity  Diet, exercise. 6.  Nephropathy  Follow micr/creat ratio. Lisinopril 10 mg qd    7. Goiter  2019 Thyroid sono did not show nodules. Unlikely difficulty swallowing and hoarseness are related to thyroid. Right lobe normal, left lobe upper limit of normal.  TSH 0.8-1.48-1.12    Reviewed and/or ordered clinical lab results Yes  Reviewed and/or ordered radiology tests No  Reviewed and/or ordered other diagnostic tests No  Discussed test results with performing physician No  Independently reviewed image, tracing, or specimen No  Made a decision to obtain old records Yes  Reviewed and summarized old records Yes  Dx in 2008. Obtained history from other than patient No    Shell Armendariz was counseled regarding symptoms of diabetes diagnosis, course and complications of disease if inadequately treated, side effects of medications, diagnosis, treatment options, and prognosis, risks, benefits, complications, and alternatives of treatment, labs, imaging and other studies and treatment targets and goals. He understands instructions and counseling. Return in about 6 months (around 6/6/2022) for diabetes.

## 2021-12-07 ENCOUNTER — OFFICE VISIT (OUTPATIENT)
Dept: ORTHOPEDIC SURGERY | Age: 66
End: 2021-12-07

## 2021-12-07 VITALS — RESPIRATION RATE: 12 BRPM | BODY MASS INDEX: 33.21 KG/M2 | HEIGHT: 70 IN | WEIGHT: 232 LBS

## 2021-12-07 DIAGNOSIS — Z98.890 S/P ARTHROSCOPY OF RIGHT KNEE: Primary | ICD-10-CM

## 2021-12-07 PROCEDURE — 99024 POSTOP FOLLOW-UP VISIT: CPT | Performed by: ORTHOPAEDIC SURGERY

## 2021-12-07 NOTE — PROGRESS NOTES
Chief Complaint  Knee Pain (F/u right knee scope 9/24. Notes overall doing well, did have 1 episode of locking while sleeping about 1 week ago)      History of Present Illness:  Yusef Ramirez is a pleasant 61 y.o. male who is 10 and half weeks post right knee arthroscopy, medial meniscal debridement and limited chondroplasty of the patellofemoral joint and medial femoral condyle. He has been doing terrific and is walking 3 to 5 miles many times a week. He had a slight setback a week ago where while in bed his knee locked and he had to push it straight. Since then this has not recurred. Has not had to take any medicines for it. Since then no swelling giving way or any aching discomfort. In fact in the past few weeks has noticed his left so it has become a bit uncomfortable but not to the same extent as his right knee before surgery. Medical History:  Patient's medications, allergies, past medical, surgical, social and family histories were reviewed and updated as appropriate. Pain Assessment  Location of Pain: Knee  Location Modifiers: Right  Severity of Pain: 2  Quality of Pain: Aching  Duration of Pain: A few hours  Frequency of Pain: Intermittent  Aggravating Factors: Walking  Limiting Behavior: Some  Relieving Factors: Rest  Result of Injury: No  Work-Related Injury: No  Are there other pain locations you wish to document?: No  ROS: Review of systems reviewed from Patient History Form completed today and available in the patient's chart under the Media tab. Pertinent items are noted in HPI  Review of systems reviewed from Patient History Form completed today and available in the patient's chart under the Media tab. Vital Signs:  Resp 12   Ht 5' 10\" (1.778 m)   Wt 232 lb (105.2 kg)   BMI 33.29 kg/m²         Neuro: Alert & oriented x 3,  normal,  no focal deficits noted. Normal affect.   Eyes: sclera clear  Ears: Normal external ear  Mouth:  No perioral lesions  Pulm: Respirations unlabored and regular  Pulse: Extremities well perfused. 2+ peripheral pulses. Skin: Warm. No ulcerations. Constitutional: The physical examination finds the patient to be well-developed and well-nourished. The patient is alert and oriented x3 and was cooperative throughout the visit. Right knee exam    Gait: Antalgic using a cane    Alignment: normal alignment. Inspection/skin: Portals well-healed no signs of infection. Skin is intact without erythema or ecchymosis. No gross deformity. Palpation: mild crepitus. no joint line tenderness present. Range of Motion: There is full range of motion. Strength: Normal quadriceps development. Effusion: No effusion or swelling present. Ligamentous stability: No cruciate or collateral ligament instability. Neurologic and vascular: Skin is warm and well-perfused. Sensation is intact to light-touch. Special tests: Negative Dorothea sign. Radiology:     No new x-rays      Assessment: Patient is a 61 y.o. male who is doing great 10 and half weeks post right knee arthroscopy and partial medial meniscectomy and chondroplasty. I have no concern about the one-time locking episode of this could be due to some scar tissue. Impression:  Visit Diagnoses       Codes    S/P arthroscopy of right knee    -  Primary R33.801          Office Procedures:  No orders of the defined types were placed in this encounter. No orders of the defined types were placed in this encounter. Plan:  I will like to see Regan Horvath back in about 6 months. At that point should his other knee becomes symptomatic we can order dedicated x-rays. He should continue with his activities as tolerated at this time. We spoke about pursuing a possible [employer] [facility] membership in order to stay active as he wishes. All the patient's questions were answered while in the clinic.   The patient is understanding of all instructions and agrees with the plan. Approximately 30 minutes was spent on patient education and coordinating care. Follow up in: Return in about 6 months (around 6/7/2022). Sincerely,    David Caceres MD [address] [facility]. Jackie Alexandr Boudreaux, [address]  Email: [email or web address]  Office: [phone]    12/07/21  4:51 PM      The encounter with Catarina Ambrose was carried out by myself, Dr Deon Charles, who personally examined the patient and reviewed the plan. This dictation was performed with a verbal recognition program (DRAGON) and it was checked for errors. It is possible that there are still dictated errors within this office note. If so, please bring any errors to my attention for an addendum. All efforts were made to ensure that this office note is accurate.

## 2022-01-10 DIAGNOSIS — E78.2 MIXED HYPERLIPIDEMIA: ICD-10-CM

## 2022-01-11 RX ORDER — ICOSAPENT ETHYL 1000 MG/1
CAPSULE ORAL
Qty: 360 CAPSULE | Refills: 0 | Status: SHIPPED | OUTPATIENT
Start: 2022-01-11 | End: 2022-01-20

## 2022-01-11 RX ORDER — ROSUVASTATIN CALCIUM 20 MG/1
TABLET, COATED ORAL
Qty: 90 TABLET | Refills: 0 | Status: SHIPPED | OUTPATIENT
Start: 2022-01-11 | End: 2022-04-20

## 2022-01-20 DIAGNOSIS — E78.2 MIXED HYPERLIPIDEMIA: ICD-10-CM

## 2022-01-20 RX ORDER — ICOSAPENT ETHYL 1000 MG/1
CAPSULE ORAL
Qty: 180 CAPSULE | Refills: 0 | Status: SHIPPED | OUTPATIENT
Start: 2022-01-20 | End: 2022-06-14

## 2022-03-02 DIAGNOSIS — E78.2 MIXED HYPERLIPIDEMIA: ICD-10-CM

## 2022-03-02 RX ORDER — FENOFIBRATE 160 MG/1
TABLET ORAL
Qty: 90 TABLET | Refills: 0 | Status: SHIPPED | OUTPATIENT
Start: 2022-03-02 | End: 2022-06-10

## 2022-03-02 RX ORDER — LISINOPRIL 10 MG/1
TABLET ORAL
Qty: 90 TABLET | Refills: 0 | Status: SHIPPED | OUTPATIENT
Start: 2022-03-02 | End: 2022-06-10

## 2022-03-14 DIAGNOSIS — E78.2 MIXED HYPERLIPIDEMIA: ICD-10-CM

## 2022-03-14 RX ORDER — FENOFIBRATE 160 MG/1
TABLET ORAL
Qty: 90 TABLET | Refills: 0 | OUTPATIENT
Start: 2022-03-14

## 2022-03-14 RX ORDER — LISINOPRIL 10 MG/1
TABLET ORAL
Qty: 90 TABLET | Refills: 0 | OUTPATIENT
Start: 2022-03-14

## 2022-03-25 DIAGNOSIS — E11.40 TYPE 2 DIABETES, CONTROLLED, WITH NEUROPATHY (HCC): ICD-10-CM

## 2022-04-20 DIAGNOSIS — E78.2 MIXED HYPERLIPIDEMIA: ICD-10-CM

## 2022-04-20 RX ORDER — ROSUVASTATIN CALCIUM 20 MG/1
TABLET, COATED ORAL
Qty: 90 TABLET | Refills: 0 | Status: SHIPPED | OUTPATIENT
Start: 2022-04-20 | End: 2022-06-16 | Stop reason: SDUPTHER

## 2022-06-09 DIAGNOSIS — E04.9 GOITER: ICD-10-CM

## 2022-06-09 DIAGNOSIS — E55.9 VITAMIN D DEFICIENCY: ICD-10-CM

## 2022-06-09 DIAGNOSIS — E11.40 TYPE 2 DIABETES, CONTROLLED, WITH NEUROPATHY (HCC): ICD-10-CM

## 2022-06-09 DIAGNOSIS — E78.2 MIXED HYPERLIPIDEMIA: ICD-10-CM

## 2022-06-09 LAB
A/G RATIO: 2.5 (ref 1.1–2.2)
ALBUMIN SERPL-MCNC: 5.3 G/DL (ref 3.4–5)
ALP BLD-CCNC: 76 U/L (ref 40–129)
ALT SERPL-CCNC: 15 U/L (ref 10–40)
ANION GAP SERPL CALCULATED.3IONS-SCNC: 17 MMOL/L (ref 3–16)
AST SERPL-CCNC: 20 U/L (ref 15–37)
BILIRUB SERPL-MCNC: <0.2 MG/DL (ref 0–1)
BUN BLDV-MCNC: 20 MG/DL (ref 7–20)
CALCIUM SERPL-MCNC: 10.8 MG/DL (ref 8.3–10.6)
CHLORIDE BLD-SCNC: 100 MMOL/L (ref 99–110)
CHOLESTEROL, TOTAL: 134 MG/DL (ref 0–199)
CO2: 23 MMOL/L (ref 21–32)
CREAT SERPL-MCNC: 0.9 MG/DL (ref 0.8–1.3)
GFR AFRICAN AMERICAN: >60
GFR NON-AFRICAN AMERICAN: >60
GLUCOSE BLD-MCNC: 136 MG/DL (ref 70–99)
HDLC SERPL-MCNC: 28 MG/DL (ref 40–60)
LDL CHOLESTEROL CALCULATED: 67 MG/DL
POTASSIUM SERPL-SCNC: 4.8 MMOL/L (ref 3.5–5.1)
SODIUM BLD-SCNC: 140 MMOL/L (ref 136–145)
T4 FREE: 1.4 NG/DL (ref 0.9–1.8)
TOTAL PROTEIN: 7.4 G/DL (ref 6.4–8.2)
TRIGL SERPL-MCNC: 193 MG/DL (ref 0–150)
TSH SERPL DL<=0.05 MIU/L-ACNC: 1.49 UIU/ML (ref 0.27–4.2)
VITAMIN D 25-HYDROXY: 35.1 NG/ML
VLDLC SERPL CALC-MCNC: 39 MG/DL

## 2022-06-10 DIAGNOSIS — E78.2 MIXED HYPERLIPIDEMIA: ICD-10-CM

## 2022-06-10 LAB
ESTIMATED AVERAGE GLUCOSE: 142.7 MG/DL
HBA1C MFR BLD: 6.6 %

## 2022-06-10 RX ORDER — LISINOPRIL 10 MG/1
TABLET ORAL
Qty: 30 TABLET | Refills: 0 | Status: SHIPPED | OUTPATIENT
Start: 2022-06-10 | End: 2022-06-16 | Stop reason: SDUPTHER

## 2022-06-10 RX ORDER — FENOFIBRATE 160 MG/1
TABLET ORAL
Qty: 30 TABLET | Refills: 0 | Status: SHIPPED | OUTPATIENT
Start: 2022-06-10 | End: 2022-06-16 | Stop reason: SDUPTHER

## 2022-06-13 DIAGNOSIS — E78.2 MIXED HYPERLIPIDEMIA: ICD-10-CM

## 2022-06-14 RX ORDER — ICOSAPENT ETHYL 1000 MG/1
CAPSULE ORAL
Qty: 120 CAPSULE | Refills: 0 | Status: SHIPPED | OUTPATIENT
Start: 2022-06-14 | End: 2022-06-16 | Stop reason: SDUPTHER

## 2022-06-16 ENCOUNTER — OFFICE VISIT (OUTPATIENT)
Dept: ENDOCRINOLOGY | Age: 67
End: 2022-06-16
Payer: MEDICARE

## 2022-06-16 VITALS
WEIGHT: 223 LBS | HEART RATE: 65 BPM | BODY MASS INDEX: 31.92 KG/M2 | HEIGHT: 70 IN | TEMPERATURE: 98 F | SYSTOLIC BLOOD PRESSURE: 118 MMHG | DIASTOLIC BLOOD PRESSURE: 76 MMHG | RESPIRATION RATE: 14 BRPM | OXYGEN SATURATION: 96 %

## 2022-06-16 DIAGNOSIS — E23.0 HYPOGONADOTROPIC HYPOGONADISM (HCC): ICD-10-CM

## 2022-06-16 DIAGNOSIS — E04.9 GOITER: ICD-10-CM

## 2022-06-16 DIAGNOSIS — E11.40 TYPE 2 DIABETES, CONTROLLED, WITH NEUROPATHY (HCC): Primary | ICD-10-CM

## 2022-06-16 DIAGNOSIS — E78.2 MIXED HYPERLIPIDEMIA: ICD-10-CM

## 2022-06-16 DIAGNOSIS — E83.52 HYPERCALCEMIA: ICD-10-CM

## 2022-06-16 DIAGNOSIS — E11.21 CONTROLLED TYPE 2 DIABETES MELLITUS WITH DIABETIC NEPHROPATHY, WITH LONG-TERM CURRENT USE OF INSULIN (HCC): ICD-10-CM

## 2022-06-16 DIAGNOSIS — E55.9 VITAMIN D DEFICIENCY: ICD-10-CM

## 2022-06-16 DIAGNOSIS — E66.9 CLASS 1 OBESITY WITH SERIOUS COMORBIDITY AND BODY MASS INDEX (BMI) OF 33.0 TO 33.9 IN ADULT, UNSPECIFIED OBESITY TYPE: ICD-10-CM

## 2022-06-16 DIAGNOSIS — Z79.4 CONTROLLED TYPE 2 DIABETES MELLITUS WITH DIABETIC NEPHROPATHY, WITH LONG-TERM CURRENT USE OF INSULIN (HCC): ICD-10-CM

## 2022-06-16 PROCEDURE — 99214 OFFICE O/P EST MOD 30 MIN: CPT | Performed by: INTERNAL MEDICINE

## 2022-06-16 PROCEDURE — 3044F HG A1C LEVEL LT 7.0%: CPT | Performed by: INTERNAL MEDICINE

## 2022-06-16 RX ORDER — ICOSAPENT ETHYL 1000 MG/1
CAPSULE ORAL
Qty: 360 CAPSULE | Refills: 1 | Status: SHIPPED | OUTPATIENT
Start: 2022-06-16 | End: 2022-10-24

## 2022-06-16 RX ORDER — TESTOSTERONE CYPIONATE 200 MG/ML
INJECTION INTRAMUSCULAR
Qty: 10 ML | Refills: 1 | Status: SHIPPED | OUTPATIENT
Start: 2022-06-16 | End: 2024-04-04

## 2022-06-16 RX ORDER — LISINOPRIL 10 MG/1
TABLET ORAL
Qty: 90 TABLET | Refills: 1 | Status: SHIPPED | OUTPATIENT
Start: 2022-06-16

## 2022-06-16 RX ORDER — FENOFIBRATE 160 MG/1
TABLET ORAL
Qty: 90 TABLET | Refills: 1 | Status: SHIPPED | OUTPATIENT
Start: 2022-06-16

## 2022-06-16 RX ORDER — ROSUVASTATIN CALCIUM 20 MG/1
TABLET, COATED ORAL
Qty: 90 TABLET | Refills: 1 | Status: SHIPPED | OUTPATIENT
Start: 2022-06-16

## 2022-06-16 NOTE — PROGRESS NOTES
Naila Perez is a 61 y.o. male who presents for Type 2 diabetes mellitus. Current symptoms/problems include hyperglycemia and is unchanged. 1.  Type 2 diabetes, controlled, with neuropathy ([address] [E11.40]    Diagnosed with Type 2 diabetes mellitus in 2008.     Comorbid conditions: hyperlipidemia and Neuropathy    Current diabetic medications include: metformin, Januvia, Jardiance    Intolerance to diabetes medications: No     Weight trend: stable  Prior visit with dietician: yes  Current diet: on average, 3 meals per day  Current exercise: frequent     Current monitoring regimen: home blood tests - 1 times daily   Has brought blood glucose log/meter:  No  Home blood sugar records: fasting range:   and postprandial range:   Any episodes of hypoglycemia? Yes  Hypoglycemia frequency and time(s):  Very rare  Does patient have Glucagon emergency kit? No  Does patient have rapid acting carbohydrate? No  Does patient wear a medic alert bracelet or necklace? No    2. Mixed hyperlipidemia  No muscle pain. Has cramps. 3. Hypogonadotropic hypogonadism (HCC)  Has weakness. 4. Vitamin D deficiency  No bone pain. 5. Obesity  Eats healthy, exercises. 6. Goiter  Has difficulty swallowing. Has hoarseness. Aunt had thyroid cancer  No radiation to his neck.     8.  Hypercalcemia  No kidney stones  No numbness, tingling  Walks  Has tingling I f walks 6-7 miles  Not on calcium supplements  On vitamin D  No fractures    Lab Results   Component Value Date    LABA1C 6.6 06/09/2022     Lab Results   Component Value Date    .7 06/09/2022         Past Medical History:   Diagnosis Date    Bilateral swelling of feet     Generalized headaches     Goiter     Hemorrhoids     Hiatal hernia     Hyperlipidemia     Impaired fasting glucose     Neuropathy     weakness bilat LE's worse Right    Other and unspecified anterior pituitary hyperfunction     Other anterior pituitary disorders     Other testicular hypofunction     Other testicular hypofunction     Parkinson's disease     Reflux     Sarcoidosis       Patient Active Problem List   Diagnosis    Mixed hyperlipidemia    Diabetic polyneuropathy ([address]    Hypogonadotropic hypogonadism (HCC)    Type 2 diabetes, controlled, with neuropathy ([address]    Parkinson's disease ([address]    Atypical parkinsonism ([address]    Proteinuria    Vitamin D deficiency    Class 1 obesity with body mass index (BMI) of 33.0 to 33.9 in adult    Controlled type 2 diabetes mellitus with diabetic nephropathy, with long-term current use of insulin ([address]    Goiter    Hypercalcemia     Past Surgical History:   Procedure Laterality Date    BRAIN SURGERY      brainstem (pt denies having brain surgery)    CATARACT REMOVAL  12-10-14    COLONOSCOPY  ca 2009    no polyps.      EXTERNAL EAR SURGERY Bilateral     EYE SURGERY      FOOT SURGERY      KNEE ARTHROSCOPY Right 9/24/2021    RIGHT KNEE ARTHROSCOPY, SYNOVECTOMY, PARTIAL MEDIAL MENISCECTOMY, CHONDROPLASTY performed by Mimi Dasilva MD at [place] [address] Right 10/2021    OTHER SURGICAL HISTORY      Skull repair as child top of head indented now    PROSTATE SURGERY  05/2022    SKIN BIOPSY      SKIN BIOPSY      TONSILLECTOMY      UMBILICAL HERNIA REPAIR  8/76/27    umbillical hernia repair with mesh     Social History     Socioeconomic History    Marital status:      Spouse name: Not on file    Number of children: 2    Years of education: Not on file    Highest education level: Not on file   Occupational History    Occupation: retired    Tobacco Use    Smoking status: Never Smoker    Smokeless tobacco: Never Used   Vaping Use    Vaping Use: Never used   Substance and Sexual Activity    Alcohol use: No    Drug use: No    Sexual activity: Yes     Partners: Female   Other Topics Concern    Not on file   Social History Narrative    Not on file     Social Determinants of Health     Financial Resource Strain:     Difficulty of Paying Living Expenses: Not on file   Food Insecurity:     Worried About Running Out of Food in the Last Year: Not on file    Shelley of Food in the Last Year: Not on file   Transportation Needs:     Lack of Transportation (Medical): Not on file    Lack of Transportation (Non-Medical):  Not on file   Physical Activity:     Days of Exercise per Week: Not on file    Minutes of Exercise per Session: Not on file   Stress:     Feeling of Stress : Not on file   Social Connections:     Frequency of Communication with Friends and Family: Not on file    Frequency of Social Gatherings with Friends and Family: Not on file    Attends Jehovah's witness Services: Not on file    Active Member of Clubs or Organizations: Not on file    Attends Club or Organization Meetings: Not on file    Marital Status: Not on file   Intimate Partner Violence:     Fear of Current or Ex-Partner: Not on file    Emotionally Abused: Not on file    Physically Abused: Not on file    Sexually Abused: Not on file   Housing Stability:     Unable to Pay for Housing in the Last Year: Not on file    Number of Places Lived in the Last Year: Not on file    Unstable Housing in the Last Year: Not on file     Family History   Problem Relation Age of Onset    Diabetes Mother     Thyroid Disease Paternal Aunt     Cancer Paternal Aunt     Diabetes Sister     Thyroid Disease Sister     Stroke Brother     Diabetes Maternal Aunt     Diabetes Maternal Uncle     Diabetes Maternal Grandmother     Diabetes Maternal Grandfather     High Blood Pressure Neg Hx     Heart Disease Neg Hx      Current Outpatient Medications   Medication Sig Dispense Refill    testosterone cypionate (DEPOTESTOTERONE CYPIONATE) 200 MG/ML injection INJECT 1 ML INTO THE MUSCLE EVERY 14 DAYS 10 mL 1    SITagliptin (JANUVIA) 100 MG tablet TAKE 1 TABLET BY MOUTH EVERY DAY 90 tablet 1    rosuvastatin (CRESTOR) 20 MG tablet TAKE 1 TABLET BY MOUTH EVERY DAY 90 tablet 1    metFORMIN (GLUCOPHAGE) 1000 MG tablet TAKE 1 TABLET BY MOUTH TWO TIMES A DAY WITH MEALS 180 tablet 1    lisinopril (PRINIVIL;ZESTRIL) 10 MG tablet TAKE 1 TABLET BY MOUTH EVERY DAY 90 tablet 1    empagliflozin (JARDIANCE) 25 MG tablet TAKE 1 TABLET BY MOUTH EVERY DAY 90 tablet 1    Icosapent Ethyl (VASCEPA) 1 g CAPS capsule TAKE 2 CAPSULES BY MOUTH TWO TIMES A  capsule 1    fenofibrate (TRIGLIDE) 160 MG tablet TAKE 1 TABLET BY MOUTH EVERY DAY 90 tablet 1    Cholecalciferol (VITAMIN D3) 125 MCG (5000 UT) TABS Take 1 tablet by mouth daily 150 tablet 0    aspirin EC 81 MG EC tablet Take 1 tablet by mouth 2 times daily for 14 days 28 tablet 0    Lancets (ONETOUCH DELICA PLUS [id number]) MISC USE TO TEST BLOOD SUGAR FOUR TIMES A  each 0    hydroxychloroquine (PLAQUENIL) 200 MG tablet TAKE 2 TABLETS BY MOUTH ONE TIME A DAY with breakfast      ONE TOUCH ULTRA TEST strip 4 times daily. 400 strip 3    Lancets (ONETOUCH DELICA PLUS MFXSIW50O) MISC use to test blood sugar 4 times daily 100 each 0    BREO ELLIPTA 200-25 MCG/INH AEPB INHALE 1 PUFF BY MOUTH ONE TIME A DAY  11    Blood Glucose Monitoring Suppl (ONE TOUCH ULTRA 2) w/Device KIT To check blood glucose 4 times daily.  1 kit 0    propranolol (INDERAL) 80 MG tablet Take 80 mg by mouth daily       carbidopa-levodopa (SINEMET)  MG per tablet Take 2 tablets by mouth 4 times daily 1.5 tablets       Syringe, Disposable, 3 ML MISC Once every 2 weeks (Patient not taking: Reported on 12/7/2021) 100 each 3    FREESTYLE LANCETS MISC 1 Units by Does not apply route 4 times daily (Patient not taking: Reported on 6/16/2022) 400 each 5    Blood Glucose Monitoring Suppl (FREESTYLE LITE) LIDIA 1 Device by Does not apply route 4 times daily (Patient not taking: Reported on 6/16/2022) 1 Device 0    Probiotic Product (PROBIOTIC-10 PO) Take 10 mg by mouth daily  (Patient not taking: Reported on 6/16/2022) No current facility-administered medications for this visit.      No Known Allergies  Family Status   Relation Name Status    Mother      Father     [de-identified]  (Not Specified)    Sister  (Not Specified)    Brother  (Not Specified)    MAunt  (Not Specified)    MUnc  (Not Specified)    MGM  (Not Specified)    MGF  (Not Specified)    Neg Hx  (Not Specified)       Review of Systems  Constitutional: has fatigue, no fever, no recent weight gain, has recent weight loss, no changes in appetite  Eyes: no eye pain, no change in vision, no eye redness, no eye irritation, no double vision  Ears, nose, throat: no nasal congestion, no sore throat, no earache, no decrease in hearing, no hoarseness, no dry mouth, no sinus problems, has difficulty swallowing, no neck lumps, no dental problems, no mouth sores, no ringing in ears  Pulmonary: no shortness of breath, no wheezing, no dyspnea on exertion, no cough  Cardiovascular: no chest pain, no lower extremity edema, no orthopnea, no intermittent leg claudication, no palpitations  Gastrointestinal: no abdominal pain, no nausea, no vomiting, no diarrhea, no constipation, no dysphagia, no heartburn, no bloating  Genitourinary: no dysuria, no urinary incontinence, no urinary hesitancy, no urinary frequency, no feelings of urinary urgency, no nocturia  Musculoskeletal: no joint swelling, no joint stiffness, has joint pain, has muscle cramps, has muscle pain, no bone pain  Integument/Breast: no skin rashes, no skin lesions, no itching, no dry skin, no breast pain, no breast mass, no skin hives, no skin discoloration, no nipple discharge  Neurological: no numbness, no tingling, no weakness, no confusion, has headaches, has dizziness, no fainting, has tremors, has decrease in memory, has balance problems  Psychiatric: has anxiety, no depression, has insomnia  Hematologic/Lymphatic: no tendency for easy bleeding, no swollen lymph nodes, no tendency for easy bruising  Immunology: no seasonal allergies, no frequent infections, no frequent illnesses  Endocrine: no temperature intolerance     OBJECTIVE:  /76   Pulse 65   Temp 98 °F (36.7 °C)   Resp 14   Ht 5' 10\" (1.778 m)   Wt 223 lb (101.2 kg)   SpO2 96%   BMI 32.00 kg/m²      Wt Readings from Last 3 Encounters:   06/16/22 223 lb (101.2 kg)   12/07/21 232 lb (105.2 kg)   12/06/21 232 lb (105.2 kg)         Constitutional: no acute distress, well appearing and well nourished  Psychiatric: oriented to person, place and time, judgement and insight and normal, recent and remote memory and intact and mood and affect are normal  Skin: skin and subcutaneous tissue is normal without mass, normal turgor  Head and Face: examination of head and face revealed no abnormalities  Eyes: no lid or conjunctival swelling, erythema or discharge, pupils are normal, equal, round, reactive to light  Ears/Nose: external inspection of ears and nose revealed no abnormalities, hearing is grossly normal  Oropharynx/Mouth/Face: lips, tongue and gums are normal with no lesions, the voice quality was normal  Neck: neck is supple and symmetric, with midline trachea and no masses, thyroid is enlarged  Lymphatics: normal cervical lymph nodes, normal supraclavicular nodes  Pulmonary: no increased work of breathing or signs of respiratory distress, lungs are clear to auscultation  Cardiovascular: normal heart rate and rhythm, normal S1 and S2, no murmurs and pedal pulses and 2+ bilaterally, no edema  Abdomen: abdomen is soft, non-tender with no masses  Musculoskeletal: normal gait and station and exam of the digits and nails are normal  Neurological: normal coordination and normal general cortical function    Visual inspection:  Deformity/amputation: absent  Skin lesions/pre-ulcerative calluses: absent  Edema: right- negative, left- negative     Sensory exam:  Monofilament sensation: abnormal on the right, normal on the left  (minimum of 5 random plantar locations tested, avoiding callused areas - > 1 area with absence of sensation is + for neuropathy)     Plus at least one of the following:  Pulses: normal  Proprioception: abnormal on the right, normal on the left  Vibration (128 Hz): decreased on the right, decreased on the left  High risk feet    ASSESSMENT/PLAN:  1. Type 2 diabetes, controlled, with neuropathy (HCC)  HbA1C 13.1-7.6-6.3-6.4-6.6  - Jardiance 25 mg daily. - Januvia 100 mg  - metFORMIN (GLUCOPHAGE) 1000 MG tablet; Take 1 tablet by mouth 2 times daily (with meals)   - Hemoglobin A1C; Future  - Comprehensive Metabolic Panel; Future  - Microalbumin / Creatinine Urine Ratio; Future    2. Mixed hyperlipidemia  -[id number]  [phone]  HDL [id number]  Rosuvastatin 20 mg  Improved. Diet. Patient has muscle weakness, pain and cramping. Has Parkinson's.  - fenofibrate 160 MG tablet; Take 1 tablet by mouth daily    - Vascepa 2 caps bid  - Lipid Panel; Future    3. Hypogonadotropic hypogonadism (HCC)  Uncontrolled. Forgets at times. Testosterone [phone]  - testosterone cypionate (DEPOTESTOTERONE CYPIONATE) 200 MG/ML injection; Inject 1 mL into the muscle every 14 days. - CBC; Future  - Testosterone, free, total; Future    4. Vitamin D deficiency  25OH vit D 40-31-67.7-52.3-35.1  Supplements vitamin D 5000 IU daily. - Vitamin D 25 Hydroxy; Future    5. Obesity  Diet, exercise. 6.  Nephropathy  Follow micr/creat ratio. Lisinopril 10 mg qd    7. Goiter  2019 Thyroid sono did not show nodules. Unlikely difficulty swallowing and hoarseness are related to thyroid. Right lobe normal, left lobe upper limit of normal.  TSH 0.8-1.48-1.12-1.49    8.   Hypercalcemia  Calcium 10.8, upper limit normal 10.6  Albumin 5.3  New problem  Obtain testing  PTH, CMP, ionized calcium, SPEP, PO4      Reviewed and/or ordered clinical lab results Yes  Reviewed and/or ordered radiology tests No  Reviewed and/or ordered other diagnostic tests No  Discussed test results with performing physician No  Independently reviewed image, tracing, or specimen No  Made a decision to obtain old records Yes  Reviewed and summarized old records Yes  Dx in 2008. Obtained history from other than patient No    Batsheva Steward. was counseled regarding symptoms of diabetes diagnosis, course and complications of disease if inadequately treated, side effects of medications, diagnosis, treatment options, and prognosis, risks, benefits, complications, and alternatives of treatment, labs, imaging and other studies and treatment targets and goals. He understands instructions and counseling. Return in about 6 months (around 12/16/2022) for diabetes.

## 2022-07-22 DIAGNOSIS — E78.2 MIXED HYPERLIPIDEMIA: ICD-10-CM

## 2022-07-22 DIAGNOSIS — E11.21 CONTROLLED TYPE 2 DIABETES MELLITUS WITH DIABETIC NEPHROPATHY, WITH LONG-TERM CURRENT USE OF INSULIN (HCC): ICD-10-CM

## 2022-07-22 DIAGNOSIS — E83.52 HYPERCALCEMIA: ICD-10-CM

## 2022-07-22 DIAGNOSIS — E55.9 VITAMIN D DEFICIENCY: ICD-10-CM

## 2022-07-22 DIAGNOSIS — E23.0 HYPOGONADOTROPIC HYPOGONADISM (HCC): ICD-10-CM

## 2022-07-22 DIAGNOSIS — E04.9 GOITER: ICD-10-CM

## 2022-07-22 DIAGNOSIS — Z79.4 CONTROLLED TYPE 2 DIABETES MELLITUS WITH DIABETIC NEPHROPATHY, WITH LONG-TERM CURRENT USE OF INSULIN (HCC): ICD-10-CM

## 2022-07-22 DIAGNOSIS — E11.40 TYPE 2 DIABETES, CONTROLLED, WITH NEUROPATHY (HCC): ICD-10-CM

## 2022-07-25 NOTE — TELEPHONE ENCOUNTER
Per [facility] spoke with pt's wife and he has not been on the rosuvastatin in over a year. Pt's wife stated that Dr. Mariam Kovacs stopped the rosuvastatin. The wife was advised that the pt needs to follow a low fat diet and to go immediately to the emergency room if he has abdominal pain, nausea or vomiting. Pt wife stated that the pt was recently diagnosed with sarcoidosis. Pt's wife is asking if that could have anything to do with the elevated triglycerides. yes

## 2022-07-28 DIAGNOSIS — E11.40 TYPE 2 DIABETES, CONTROLLED, WITH NEUROPATHY (HCC): ICD-10-CM

## 2022-07-28 DIAGNOSIS — E04.9 GOITER: ICD-10-CM

## 2022-07-28 DIAGNOSIS — E78.2 MIXED HYPERLIPIDEMIA: ICD-10-CM

## 2022-07-28 DIAGNOSIS — Z79.4 CONTROLLED TYPE 2 DIABETES MELLITUS WITH DIABETIC NEPHROPATHY, WITH LONG-TERM CURRENT USE OF INSULIN (HCC): ICD-10-CM

## 2022-07-28 DIAGNOSIS — E83.52 HYPERCALCEMIA: ICD-10-CM

## 2022-07-28 DIAGNOSIS — E55.9 VITAMIN D DEFICIENCY: ICD-10-CM

## 2022-07-28 DIAGNOSIS — E23.0 HYPOGONADOTROPIC HYPOGONADISM (HCC): ICD-10-CM

## 2022-07-28 DIAGNOSIS — E11.21 CONTROLLED TYPE 2 DIABETES MELLITUS WITH DIABETIC NEPHROPATHY, WITH LONG-TERM CURRENT USE OF INSULIN (HCC): ICD-10-CM

## 2022-09-22 DIAGNOSIS — E55.9 VITAMIN D DEFICIENCY: ICD-10-CM

## 2022-09-22 DIAGNOSIS — E11.40 TYPE 2 DIABETES, CONTROLLED, WITH NEUROPATHY (HCC): ICD-10-CM

## 2022-09-22 DIAGNOSIS — E78.2 MIXED HYPERLIPIDEMIA: ICD-10-CM

## 2022-09-22 DIAGNOSIS — E04.9 GOITER: ICD-10-CM

## 2022-09-22 DIAGNOSIS — E11.21 CONTROLLED TYPE 2 DIABETES MELLITUS WITH DIABETIC NEPHROPATHY, WITH LONG-TERM CURRENT USE OF INSULIN (HCC): ICD-10-CM

## 2022-09-22 DIAGNOSIS — Z79.4 CONTROLLED TYPE 2 DIABETES MELLITUS WITH DIABETIC NEPHROPATHY, WITH LONG-TERM CURRENT USE OF INSULIN (HCC): ICD-10-CM

## 2022-09-22 DIAGNOSIS — E23.0 HYPOGONADOTROPIC HYPOGONADISM (HCC): ICD-10-CM

## 2022-09-22 DIAGNOSIS — E83.52 HYPERCALCEMIA: ICD-10-CM

## 2022-10-24 DIAGNOSIS — E11.21 CONTROLLED TYPE 2 DIABETES MELLITUS WITH DIABETIC NEPHROPATHY, WITH LONG-TERM CURRENT USE OF INSULIN (HCC): ICD-10-CM

## 2022-10-24 DIAGNOSIS — E04.9 GOITER: ICD-10-CM

## 2022-10-24 DIAGNOSIS — E23.0 HYPOGONADOTROPIC HYPOGONADISM (HCC): ICD-10-CM

## 2022-10-24 DIAGNOSIS — E55.9 VITAMIN D DEFICIENCY: ICD-10-CM

## 2022-10-24 DIAGNOSIS — E83.52 HYPERCALCEMIA: ICD-10-CM

## 2022-10-24 DIAGNOSIS — E78.2 MIXED HYPERLIPIDEMIA: ICD-10-CM

## 2022-10-24 DIAGNOSIS — E11.40 TYPE 2 DIABETES, CONTROLLED, WITH NEUROPATHY (HCC): ICD-10-CM

## 2022-10-24 DIAGNOSIS — Z79.4 CONTROLLED TYPE 2 DIABETES MELLITUS WITH DIABETIC NEPHROPATHY, WITH LONG-TERM CURRENT USE OF INSULIN (HCC): ICD-10-CM

## 2022-10-24 RX ORDER — ICOSAPENT ETHYL 1000 MG/1
CAPSULE ORAL
Qty: 180 CAPSULE | Refills: 0 | Status: SHIPPED | OUTPATIENT
Start: 2022-10-24

## 2022-11-08 RX ORDER — SYRINGE WITH NEEDLE, 1 ML 25GX5/8"
SYRINGE, EMPTY DISPOSABLE MISCELLANEOUS
Qty: 100 EACH | Refills: 1 | Status: SHIPPED | OUTPATIENT
Start: 2022-11-08

## 2022-12-22 DIAGNOSIS — E04.9 GOITER: ICD-10-CM

## 2022-12-22 DIAGNOSIS — E83.52 HYPERCALCEMIA: ICD-10-CM

## 2022-12-22 DIAGNOSIS — E11.40 TYPE 2 DIABETES, CONTROLLED, WITH NEUROPATHY (HCC): ICD-10-CM

## 2022-12-22 DIAGNOSIS — E23.0 HYPOGONADOTROPIC HYPOGONADISM (HCC): ICD-10-CM

## 2022-12-22 DIAGNOSIS — E11.21 CONTROLLED TYPE 2 DIABETES MELLITUS WITH DIABETIC NEPHROPATHY, WITH LONG-TERM CURRENT USE OF INSULIN (HCC): ICD-10-CM

## 2022-12-22 DIAGNOSIS — E78.2 MIXED HYPERLIPIDEMIA: ICD-10-CM

## 2022-12-22 DIAGNOSIS — E55.9 VITAMIN D DEFICIENCY: ICD-10-CM

## 2022-12-22 DIAGNOSIS — Z79.4 CONTROLLED TYPE 2 DIABETES MELLITUS WITH DIABETIC NEPHROPATHY, WITH LONG-TERM CURRENT USE OF INSULIN (HCC): ICD-10-CM

## 2023-02-15 DIAGNOSIS — E23.0 HYPOGONADOTROPIC HYPOGONADISM (HCC): ICD-10-CM

## 2023-02-15 DIAGNOSIS — E11.40 TYPE 2 DIABETES, CONTROLLED, WITH NEUROPATHY (HCC): ICD-10-CM

## 2023-02-15 DIAGNOSIS — E83.52 HYPERCALCEMIA: ICD-10-CM

## 2023-02-15 DIAGNOSIS — E55.9 VITAMIN D DEFICIENCY: ICD-10-CM

## 2023-02-15 DIAGNOSIS — Z79.4 CONTROLLED TYPE 2 DIABETES MELLITUS WITH DIABETIC NEPHROPATHY, WITH LONG-TERM CURRENT USE OF INSULIN (HCC): ICD-10-CM

## 2023-02-15 DIAGNOSIS — E11.21 CONTROLLED TYPE 2 DIABETES MELLITUS WITH DIABETIC NEPHROPATHY, WITH LONG-TERM CURRENT USE OF INSULIN (HCC): ICD-10-CM

## 2023-02-15 DIAGNOSIS — E78.2 MIXED HYPERLIPIDEMIA: ICD-10-CM

## 2023-02-15 DIAGNOSIS — E04.9 GOITER: ICD-10-CM

## 2023-02-15 RX ORDER — ROSUVASTATIN CALCIUM 20 MG/1
TABLET, COATED ORAL
Qty: 90 TABLET | Refills: 0 | Status: SHIPPED | OUTPATIENT
Start: 2023-02-15

## 2023-02-15 RX ORDER — FENOFIBRATE 160 MG/1
TABLET ORAL
Qty: 90 TABLET | Refills: 0 | Status: SHIPPED | OUTPATIENT
Start: 2023-02-15

## 2023-02-15 RX ORDER — LISINOPRIL 10 MG/1
TABLET ORAL
Qty: 90 TABLET | Refills: 0 | Status: SHIPPED | OUTPATIENT
Start: 2023-02-15

## 2023-02-26 DIAGNOSIS — E55.9 VITAMIN D DEFICIENCY: ICD-10-CM

## 2023-02-26 DIAGNOSIS — E78.2 MIXED HYPERLIPIDEMIA: ICD-10-CM

## 2023-02-26 DIAGNOSIS — Z79.4 CONTROLLED TYPE 2 DIABETES MELLITUS WITH DIABETIC NEPHROPATHY, WITH LONG-TERM CURRENT USE OF INSULIN (HCC): ICD-10-CM

## 2023-02-26 DIAGNOSIS — E83.52 HYPERCALCEMIA: ICD-10-CM

## 2023-02-26 DIAGNOSIS — E11.40 TYPE 2 DIABETES, CONTROLLED, WITH NEUROPATHY (HCC): ICD-10-CM

## 2023-02-26 DIAGNOSIS — E04.9 GOITER: ICD-10-CM

## 2023-02-26 DIAGNOSIS — E11.21 CONTROLLED TYPE 2 DIABETES MELLITUS WITH DIABETIC NEPHROPATHY, WITH LONG-TERM CURRENT USE OF INSULIN (HCC): ICD-10-CM

## 2023-02-26 DIAGNOSIS — E23.0 HYPOGONADOTROPIC HYPOGONADISM (HCC): ICD-10-CM

## 2023-02-27 RX ORDER — LISINOPRIL 10 MG/1
TABLET ORAL
Qty: 90 TABLET | Refills: 0 | OUTPATIENT
Start: 2023-02-27

## 2023-03-03 DIAGNOSIS — E23.0 HYPOGONADOTROPIC HYPOGONADISM (HCC): ICD-10-CM

## 2023-03-03 DIAGNOSIS — E11.40 TYPE 2 DIABETES, CONTROLLED, WITH NEUROPATHY (HCC): ICD-10-CM

## 2023-03-03 DIAGNOSIS — E78.2 MIXED HYPERLIPIDEMIA: ICD-10-CM

## 2023-03-03 DIAGNOSIS — E11.21 CONTROLLED TYPE 2 DIABETES MELLITUS WITH DIABETIC NEPHROPATHY, WITH LONG-TERM CURRENT USE OF INSULIN (HCC): ICD-10-CM

## 2023-03-03 DIAGNOSIS — E04.9 GOITER: ICD-10-CM

## 2023-03-03 DIAGNOSIS — E55.9 VITAMIN D DEFICIENCY: ICD-10-CM

## 2023-03-03 DIAGNOSIS — E83.52 HYPERCALCEMIA: ICD-10-CM

## 2023-03-03 DIAGNOSIS — Z79.4 CONTROLLED TYPE 2 DIABETES MELLITUS WITH DIABETIC NEPHROPATHY, WITH LONG-TERM CURRENT USE OF INSULIN (HCC): ICD-10-CM

## 2023-03-05 DIAGNOSIS — E78.2 MIXED HYPERLIPIDEMIA: ICD-10-CM

## 2023-03-05 DIAGNOSIS — E55.9 VITAMIN D DEFICIENCY: ICD-10-CM

## 2023-03-05 DIAGNOSIS — E23.0 HYPOGONADOTROPIC HYPOGONADISM (HCC): ICD-10-CM

## 2023-03-05 DIAGNOSIS — E11.40 TYPE 2 DIABETES, CONTROLLED, WITH NEUROPATHY (HCC): ICD-10-CM

## 2023-03-05 DIAGNOSIS — Z79.4 CONTROLLED TYPE 2 DIABETES MELLITUS WITH DIABETIC NEPHROPATHY, WITH LONG-TERM CURRENT USE OF INSULIN (HCC): ICD-10-CM

## 2023-03-05 DIAGNOSIS — E11.21 CONTROLLED TYPE 2 DIABETES MELLITUS WITH DIABETIC NEPHROPATHY, WITH LONG-TERM CURRENT USE OF INSULIN (HCC): ICD-10-CM

## 2023-03-05 DIAGNOSIS — E04.9 GOITER: ICD-10-CM

## 2023-03-05 DIAGNOSIS — E83.52 HYPERCALCEMIA: ICD-10-CM

## 2023-03-06 RX ORDER — ICOSAPENT ETHYL 1000 MG/1
CAPSULE ORAL
Qty: 180 CAPSULE | Refills: 0 | Status: SHIPPED | OUTPATIENT
Start: 2023-03-06

## 2023-03-06 RX ORDER — ROSUVASTATIN CALCIUM 20 MG/1
TABLET, COATED ORAL
Qty: 90 TABLET | Refills: 0 | Status: SHIPPED | OUTPATIENT
Start: 2023-03-06

## 2023-03-23 DIAGNOSIS — E11.21 CONTROLLED TYPE 2 DIABETES MELLITUS WITH DIABETIC NEPHROPATHY, WITH LONG-TERM CURRENT USE OF INSULIN (HCC): ICD-10-CM

## 2023-03-23 DIAGNOSIS — Z79.4 CONTROLLED TYPE 2 DIABETES MELLITUS WITH DIABETIC NEPHROPATHY, WITH LONG-TERM CURRENT USE OF INSULIN (HCC): ICD-10-CM

## 2023-03-23 DIAGNOSIS — E83.52 HYPERCALCEMIA: ICD-10-CM

## 2023-03-23 DIAGNOSIS — E55.9 VITAMIN D DEFICIENCY: ICD-10-CM

## 2023-03-23 DIAGNOSIS — E04.9 GOITER: ICD-10-CM

## 2023-03-23 DIAGNOSIS — E23.0 HYPOGONADOTROPIC HYPOGONADISM (HCC): ICD-10-CM

## 2023-03-23 DIAGNOSIS — E78.2 MIXED HYPERLIPIDEMIA: ICD-10-CM

## 2023-03-23 DIAGNOSIS — E11.40 TYPE 2 DIABETES, CONTROLLED, WITH NEUROPATHY (HCC): ICD-10-CM

## 2023-03-23 LAB
25(OH)D3 SERPL-MCNC: 50.5 NG/ML
ALBUMIN SERPL-MCNC: 4.9 G/DL (ref 3.4–5)
ALBUMIN/GLOB SERPL: 2 {RATIO} (ref 1.1–2.2)
ALP SERPL-CCNC: 63 U/L (ref 40–129)
ALT SERPL-CCNC: 18 U/L (ref 10–40)
ANION GAP SERPL CALCULATED.3IONS-SCNC: 19 MMOL/L (ref 3–16)
AST SERPL-CCNC: 18 U/L (ref 15–37)
BILIRUB SERPL-MCNC: 0.4 MG/DL (ref 0–1)
BUN SERPL-MCNC: 27 MG/DL (ref 7–20)
CALCIUM SERPL-MCNC: 9.8 MG/DL (ref 8.3–10.6)
CHLORIDE SERPL-SCNC: 95 MMOL/L (ref 99–110)
CHOLEST SERPL-MCNC: 178 MG/DL (ref 0–199)
CO2 SERPL-SCNC: 21 MMOL/L (ref 21–32)
CREAT SERPL-MCNC: 0.9 MG/DL (ref 0.8–1.3)
CREAT UR-MCNC: 78.6 MG/DL (ref 39–259)
DEPRECATED RDW RBC AUTO: 15.4 % (ref 12.4–15.4)
GFR SERPLBLD CREATININE-BSD FMLA CKD-EPI: >60 ML/MIN/{1.73_M2}
GLUCOSE SERPL-MCNC: 135 MG/DL (ref 70–99)
HCT VFR BLD AUTO: 51.4 % (ref 40.5–52.5)
HDLC SERPL-MCNC: 29 MG/DL (ref 40–60)
HGB BLD-MCNC: 16.6 G/DL (ref 13.5–17.5)
LDLC SERPL CALC-MCNC: 100 MG/DL
MCH RBC QN AUTO: 27.4 PG (ref 26–34)
MCHC RBC AUTO-ENTMCNC: 32.3 G/DL (ref 31–36)
MCV RBC AUTO: 84.8 FL (ref 80–100)
MICROALBUMIN UR DL<=1MG/L-MCNC: 1.3 MG/DL
MICROALBUMIN/CREAT UR: 16.5 MG/G (ref 0–30)
PHOSPHATE SERPL-MCNC: 3.5 MG/DL (ref 2.5–4.9)
PLATELET # BLD AUTO: 247 K/UL (ref 135–450)
PMV BLD AUTO: 8.9 FL (ref 5–10.5)
POTASSIUM SERPL-SCNC: 4.8 MMOL/L (ref 3.5–5.1)
PTH-INTACT SERPL-MCNC: 24 PG/ML (ref 14–72)
RBC # BLD AUTO: 6.06 M/UL (ref 4.2–5.9)
SODIUM SERPL-SCNC: 135 MMOL/L (ref 136–145)
T3FREE SERPL-MCNC: 3.1 PG/ML (ref 2.3–4.2)
T4 FREE SERPL-MCNC: 1.3 NG/DL (ref 0.9–1.8)
TRIGL SERPL-MCNC: 246 MG/DL (ref 0–150)
TSH SERPL DL<=0.005 MIU/L-ACNC: 1.75 UIU/ML (ref 0.27–4.2)
VLDLC SERPL CALC-MCNC: 49 MG/DL
WBC # BLD AUTO: 8 K/UL (ref 4–11)

## 2023-03-23 RX ORDER — FENOFIBRATE 160 MG/1
TABLET ORAL
Qty: 90 TABLET | Refills: 0 | OUTPATIENT
Start: 2023-03-23

## 2023-03-24 LAB
ALBUMIN SERPL ELPH-MCNC: 4 G/DL (ref 3.1–4.9)
ALPHA1 GLOB SERPL ELPH-MCNC: 0.2 G/DL (ref 0.2–0.4)
ALPHA2 GLOB SERPL ELPH-MCNC: 0.8 G/DL (ref 0.4–1.1)
B-GLOBULIN SERPL ELPH-MCNC: 1.5 G/DL (ref 0.9–1.6)
EST. AVERAGE GLUCOSE BLD GHB EST-MCNC: 151.3 MG/DL
GAMMA GLOB SERPL ELPH-MCNC: 0.9 G/DL (ref 0.6–1.8)
HBA1C MFR BLD: 6.9 %
PROT SERPL-MCNC: 7.4 G/DL (ref 6.4–8.2)
SPE/IFE INTERPRETATION: NORMAL

## 2023-03-25 LAB
1,25(OH)2D3 SERPL-MCNC: 56.5 PG/ML (ref 19.9–79.3)
CA-I ADJ PH7.4 SERPL-SCNC: 1.22 MMOL/L (ref 1.09–1.3)
CA-I SERPL ISE-SCNC: 1.25 MMOL/L (ref 1.09–1.3)
SHBG SERPL-SCNC: 25 NMOL/L (ref 11–80)
TESTOST FREE SERPL-MCNC: 18.8 PG/ML (ref 47–244)
TESTOST SERPL-MCNC: 88 NG/DL (ref 220–1000)

## 2023-03-30 ENCOUNTER — OFFICE VISIT (OUTPATIENT)
Dept: ENDOCRINOLOGY | Age: 68
End: 2023-03-30
Payer: MEDICARE

## 2023-03-30 VITALS
WEIGHT: 218 LBS | DIASTOLIC BLOOD PRESSURE: 74 MMHG | HEIGHT: 70 IN | BODY MASS INDEX: 31.21 KG/M2 | SYSTOLIC BLOOD PRESSURE: 123 MMHG | RESPIRATION RATE: 14 BRPM | TEMPERATURE: 98 F | OXYGEN SATURATION: 95 % | HEART RATE: 61 BPM

## 2023-03-30 DIAGNOSIS — E23.0 HYPOGONADOTROPIC HYPOGONADISM (HCC): ICD-10-CM

## 2023-03-30 DIAGNOSIS — E04.9 GOITER: ICD-10-CM

## 2023-03-30 DIAGNOSIS — E78.2 MIXED HYPERLIPIDEMIA: ICD-10-CM

## 2023-03-30 DIAGNOSIS — Z79.4 CONTROLLED TYPE 2 DIABETES MELLITUS WITH DIABETIC NEPHROPATHY, WITH LONG-TERM CURRENT USE OF INSULIN (HCC): ICD-10-CM

## 2023-03-30 DIAGNOSIS — E11.21 CONTROLLED TYPE 2 DIABETES MELLITUS WITH DIABETIC NEPHROPATHY, WITH LONG-TERM CURRENT USE OF INSULIN (HCC): ICD-10-CM

## 2023-03-30 DIAGNOSIS — E66.9 CLASS 1 OBESITY WITH SERIOUS COMORBIDITY AND BODY MASS INDEX (BMI) OF 32.0 TO 32.9 IN ADULT, UNSPECIFIED OBESITY TYPE: ICD-10-CM

## 2023-03-30 DIAGNOSIS — E11.40 TYPE 2 DIABETES, CONTROLLED, WITH NEUROPATHY (HCC): Primary | ICD-10-CM

## 2023-03-30 DIAGNOSIS — E83.52 HYPERCALCEMIA: ICD-10-CM

## 2023-03-30 DIAGNOSIS — E55.9 VITAMIN D DEFICIENCY: ICD-10-CM

## 2023-03-30 PROCEDURE — 1036F TOBACCO NON-USER: CPT | Performed by: INTERNAL MEDICINE

## 2023-03-30 PROCEDURE — G8427 DOCREV CUR MEDS BY ELIG CLIN: HCPCS | Performed by: INTERNAL MEDICINE

## 2023-03-30 PROCEDURE — 2022F DILAT RTA XM EVC RTNOPTHY: CPT | Performed by: INTERNAL MEDICINE

## 2023-03-30 PROCEDURE — 3044F HG A1C LEVEL LT 7.0%: CPT | Performed by: INTERNAL MEDICINE

## 2023-03-30 PROCEDURE — 99214 OFFICE O/P EST MOD 30 MIN: CPT | Performed by: INTERNAL MEDICINE

## 2023-03-30 PROCEDURE — G8484 FLU IMMUNIZE NO ADMIN: HCPCS | Performed by: INTERNAL MEDICINE

## 2023-03-30 PROCEDURE — 3017F COLORECTAL CA SCREEN DOC REV: CPT | Performed by: INTERNAL MEDICINE

## 2023-03-30 PROCEDURE — 1123F ACP DISCUSS/DSCN MKR DOCD: CPT | Performed by: INTERNAL MEDICINE

## 2023-03-30 PROCEDURE — G8417 CALC BMI ABV UP PARAM F/U: HCPCS | Performed by: INTERNAL MEDICINE

## 2023-03-30 RX ORDER — SEMAGLUTIDE 1.34 MG/ML
INJECTION, SOLUTION SUBCUTANEOUS
Qty: 3 ADJUSTABLE DOSE PRE-FILLED PEN SYRINGE | Refills: 1 | Status: SHIPPED | OUTPATIENT
Start: 2023-03-30

## 2023-03-30 RX ORDER — TESTOSTERONE CYPIONATE 200 MG/ML
INJECTION, SOLUTION INTRAMUSCULAR
Qty: 10 ML | Refills: 1 | Status: SHIPPED | OUTPATIENT
Start: 2023-03-30 | End: 2025-01-16

## 2023-03-30 NOTE — PROGRESS NOTES
Salima Beaver is a 74 y.o. male who presents for Type 2 diabetes mellitus. Current symptoms/problems include  hyperglycemia  and is unchanged. 1.  Type 2 diabetes, controlled, with neuropathy ([address] [E11.40]    Diagnosed with Type 2 diabetes mellitus in 2008. Comorbid conditions:  hyperlipidemia and Neuropathy    Current diabetic medications include: metformin, Januvia, Jardiance    Intolerance to diabetes medications: No     Weight trend: stable  Prior visit with dietician: yes  Current diet: on average, 3 meals per day  Current exercise: frequent     Current monitoring regimen: home blood tests - 1 times daily   Has brought blood glucose log/meter:  No  Home blood sugar records: fasting range:   and postprandial range:   Any episodes of hypoglycemia? Yes  Hypoglycemia frequency and time(s):  Very rare  Does patient have Glucagon emergency kit? No  Does patient have rapid acting carbohydrate? No  Does patient wear a medic alert bracelet or necklace? No    2. Mixed hyperlipidemia  No muscle pain. Has cramps. 3. Hypogonadotropic hypogonadism (HCC)  Has weakness. 4. Vitamin D deficiency  No bone pain. 5. Obesity  Eats healthy, exercises. 6. Goiter  Has difficulty swallowing. Has hoarseness. Aunt had thyroid cancer  No radiation to his neck.     8.  Hypercalcemia  No kidney stones  No numbness, tingling  Walks  Has tingling I f walks 6-7 miles  Not on calcium supplements  On vitamin D  No fractures    Lab Results   Component Value Date    LABA1C 6.9 03/23/2023     Lab Results   Component Value Date    .3 03/23/2023         Past Medical History:   Diagnosis Date    Bilateral swelling of feet     Generalized headaches     Goiter     Hemorrhoids     Hiatal hernia     Hyperlipidemia     Impaired fasting glucose     Neuropathy     weakness bilat LE's worse Right    Other and unspecified anterior pituitary hyperfunction     Other anterior pituitary disorders     Other

## 2023-04-26 DIAGNOSIS — E23.0 HYPOGONADOTROPIC HYPOGONADISM (HCC): ICD-10-CM

## 2023-04-26 DIAGNOSIS — E11.21 CONTROLLED TYPE 2 DIABETES MELLITUS WITH DIABETIC NEPHROPATHY, WITH LONG-TERM CURRENT USE OF INSULIN (HCC): ICD-10-CM

## 2023-04-26 DIAGNOSIS — E55.9 VITAMIN D DEFICIENCY: ICD-10-CM

## 2023-04-26 DIAGNOSIS — Z79.4 CONTROLLED TYPE 2 DIABETES MELLITUS WITH DIABETIC NEPHROPATHY, WITH LONG-TERM CURRENT USE OF INSULIN (HCC): ICD-10-CM

## 2023-04-26 DIAGNOSIS — E11.40 TYPE 2 DIABETES, CONTROLLED, WITH NEUROPATHY (HCC): ICD-10-CM

## 2023-04-26 DIAGNOSIS — E83.52 HYPERCALCEMIA: ICD-10-CM

## 2023-04-26 DIAGNOSIS — E78.2 MIXED HYPERLIPIDEMIA: ICD-10-CM

## 2023-04-26 DIAGNOSIS — E04.9 GOITER: ICD-10-CM

## 2023-05-07 DIAGNOSIS — E83.52 HYPERCALCEMIA: ICD-10-CM

## 2023-05-07 DIAGNOSIS — E23.0 HYPOGONADOTROPIC HYPOGONADISM (HCC): ICD-10-CM

## 2023-05-07 DIAGNOSIS — E11.40 TYPE 2 DIABETES, CONTROLLED, WITH NEUROPATHY (HCC): ICD-10-CM

## 2023-05-07 DIAGNOSIS — E04.9 GOITER: ICD-10-CM

## 2023-05-07 DIAGNOSIS — E11.21 CONTROLLED TYPE 2 DIABETES MELLITUS WITH DIABETIC NEPHROPATHY, WITH LONG-TERM CURRENT USE OF INSULIN (HCC): ICD-10-CM

## 2023-05-07 DIAGNOSIS — E78.2 MIXED HYPERLIPIDEMIA: ICD-10-CM

## 2023-05-07 DIAGNOSIS — Z79.4 CONTROLLED TYPE 2 DIABETES MELLITUS WITH DIABETIC NEPHROPATHY, WITH LONG-TERM CURRENT USE OF INSULIN (HCC): ICD-10-CM

## 2023-05-07 DIAGNOSIS — E55.9 VITAMIN D DEFICIENCY: ICD-10-CM

## 2023-05-14 DIAGNOSIS — E04.9 GOITER: ICD-10-CM

## 2023-05-14 DIAGNOSIS — E11.21 CONTROLLED TYPE 2 DIABETES MELLITUS WITH DIABETIC NEPHROPATHY, WITH LONG-TERM CURRENT USE OF INSULIN (HCC): ICD-10-CM

## 2023-05-14 DIAGNOSIS — E23.0 HYPOGONADOTROPIC HYPOGONADISM (HCC): ICD-10-CM

## 2023-05-14 DIAGNOSIS — Z79.4 CONTROLLED TYPE 2 DIABETES MELLITUS WITH DIABETIC NEPHROPATHY, WITH LONG-TERM CURRENT USE OF INSULIN (HCC): ICD-10-CM

## 2023-05-14 DIAGNOSIS — E83.52 HYPERCALCEMIA: ICD-10-CM

## 2023-05-14 DIAGNOSIS — E11.40 TYPE 2 DIABETES, CONTROLLED, WITH NEUROPATHY (HCC): ICD-10-CM

## 2023-05-14 DIAGNOSIS — E55.9 VITAMIN D DEFICIENCY: ICD-10-CM

## 2023-05-14 DIAGNOSIS — E78.2 MIXED HYPERLIPIDEMIA: ICD-10-CM

## 2023-05-15 RX ORDER — LISINOPRIL 10 MG/1
TABLET ORAL
Qty: 90 TABLET | Refills: 0 | Status: SHIPPED | OUTPATIENT
Start: 2023-05-15

## 2023-05-15 RX ORDER — FENOFIBRATE 160 MG/1
TABLET ORAL
Qty: 90 TABLET | Refills: 0 | Status: SHIPPED | OUTPATIENT
Start: 2023-05-15

## 2023-05-25 DIAGNOSIS — Z79.4 CONTROLLED TYPE 2 DIABETES MELLITUS WITH DIABETIC NEPHROPATHY, WITH LONG-TERM CURRENT USE OF INSULIN (HCC): ICD-10-CM

## 2023-05-25 DIAGNOSIS — E23.0 HYPOGONADOTROPIC HYPOGONADISM (HCC): ICD-10-CM

## 2023-05-25 DIAGNOSIS — E04.9 GOITER: ICD-10-CM

## 2023-05-25 DIAGNOSIS — E55.9 VITAMIN D DEFICIENCY: ICD-10-CM

## 2023-05-25 DIAGNOSIS — E11.21 CONTROLLED TYPE 2 DIABETES MELLITUS WITH DIABETIC NEPHROPATHY, WITH LONG-TERM CURRENT USE OF INSULIN (HCC): ICD-10-CM

## 2023-05-25 DIAGNOSIS — E78.2 MIXED HYPERLIPIDEMIA: ICD-10-CM

## 2023-05-25 DIAGNOSIS — E83.52 HYPERCALCEMIA: ICD-10-CM

## 2023-05-25 DIAGNOSIS — E11.40 TYPE 2 DIABETES, CONTROLLED, WITH NEUROPATHY (HCC): ICD-10-CM

## 2023-05-25 RX ORDER — LISINOPRIL 10 MG/1
TABLET ORAL
Qty: 90 TABLET | Refills: 0 | Status: SHIPPED | OUTPATIENT
Start: 2023-05-25

## 2023-05-25 RX ORDER — FENOFIBRATE 160 MG/1
TABLET ORAL
Qty: 90 TABLET | Refills: 0 | Status: SHIPPED | OUTPATIENT
Start: 2023-05-25

## 2023-05-25 RX ORDER — ROSUVASTATIN CALCIUM 20 MG/1
TABLET, COATED ORAL
Qty: 90 TABLET | Refills: 0 | Status: SHIPPED | OUTPATIENT
Start: 2023-05-25

## 2023-06-26 RX ORDER — SEMAGLUTIDE 0.68 MG/ML
INJECTION, SOLUTION SUBCUTANEOUS
Qty: 6 ML | Refills: 0 | Status: SHIPPED | OUTPATIENT
Start: 2023-06-26

## 2023-07-10 DIAGNOSIS — E04.9 GOITER: ICD-10-CM

## 2023-07-10 DIAGNOSIS — E83.52 HYPERCALCEMIA: ICD-10-CM

## 2023-07-10 DIAGNOSIS — E11.21 CONTROLLED TYPE 2 DIABETES MELLITUS WITH DIABETIC NEPHROPATHY, WITH LONG-TERM CURRENT USE OF INSULIN (HCC): ICD-10-CM

## 2023-07-10 DIAGNOSIS — E78.2 MIXED HYPERLIPIDEMIA: ICD-10-CM

## 2023-07-10 DIAGNOSIS — Z79.4 CONTROLLED TYPE 2 DIABETES MELLITUS WITH DIABETIC NEPHROPATHY, WITH LONG-TERM CURRENT USE OF INSULIN (HCC): ICD-10-CM

## 2023-07-10 DIAGNOSIS — E23.0 HYPOGONADOTROPIC HYPOGONADISM (HCC): ICD-10-CM

## 2023-07-10 DIAGNOSIS — E11.40 TYPE 2 DIABETES, CONTROLLED, WITH NEUROPATHY (HCC): ICD-10-CM

## 2023-07-10 DIAGNOSIS — E55.9 VITAMIN D DEFICIENCY: ICD-10-CM

## 2023-07-18 DIAGNOSIS — E11.40 TYPE 2 DIABETES, CONTROLLED, WITH NEUROPATHY (HCC): ICD-10-CM

## 2023-07-18 DIAGNOSIS — E55.9 VITAMIN D DEFICIENCY: ICD-10-CM

## 2023-07-18 DIAGNOSIS — E78.2 MIXED HYPERLIPIDEMIA: ICD-10-CM

## 2023-07-18 DIAGNOSIS — E83.52 HYPERCALCEMIA: ICD-10-CM

## 2023-07-18 DIAGNOSIS — E23.0 HYPOGONADOTROPIC HYPOGONADISM (HCC): ICD-10-CM

## 2023-07-18 DIAGNOSIS — E11.21 CONTROLLED TYPE 2 DIABETES MELLITUS WITH DIABETIC NEPHROPATHY, WITH LONG-TERM CURRENT USE OF INSULIN (HCC): ICD-10-CM

## 2023-07-18 DIAGNOSIS — Z79.4 CONTROLLED TYPE 2 DIABETES MELLITUS WITH DIABETIC NEPHROPATHY, WITH LONG-TERM CURRENT USE OF INSULIN (HCC): ICD-10-CM

## 2023-07-18 LAB
25(OH)D3 SERPL-MCNC: 58.3 NG/ML
ALBUMIN SERPL-MCNC: 4.9 G/DL (ref 3.4–5)
ALBUMIN/GLOB SERPL: 1.8 {RATIO} (ref 1.1–2.2)
ALP SERPL-CCNC: 56 U/L (ref 40–129)
ALT SERPL-CCNC: 9 U/L (ref 10–40)
ANION GAP SERPL CALCULATED.3IONS-SCNC: 15 MMOL/L (ref 3–16)
AST SERPL-CCNC: 21 U/L (ref 15–37)
BILIRUB SERPL-MCNC: 0.4 MG/DL (ref 0–1)
BUN SERPL-MCNC: 25 MG/DL (ref 7–20)
CALCIUM SERPL-MCNC: 10 MG/DL (ref 8.3–10.6)
CHLORIDE SERPL-SCNC: 100 MMOL/L (ref 99–110)
CHOLEST SERPL-MCNC: 134 MG/DL (ref 0–199)
CO2 SERPL-SCNC: 23 MMOL/L (ref 21–32)
CREAT SERPL-MCNC: 0.9 MG/DL (ref 0.8–1.3)
DEPRECATED RDW RBC AUTO: 15.3 % (ref 12.4–15.4)
EST. AVERAGE GLUCOSE BLD GHB EST-MCNC: 122.6 MG/DL
GFR SERPLBLD CREATININE-BSD FMLA CKD-EPI: >60 ML/MIN/{1.73_M2}
GLUCOSE SERPL-MCNC: 117 MG/DL (ref 70–99)
HBA1C MFR BLD: 5.9 %
HCT VFR BLD AUTO: 47.9 % (ref 40.5–52.5)
HDLC SERPL-MCNC: 22 MG/DL (ref 40–60)
HGB BLD-MCNC: 16.3 G/DL (ref 13.5–17.5)
LDL CHOLESTEROL CALCULATED: 64 MG/DL
MCH RBC QN AUTO: 29.8 PG (ref 26–34)
MCHC RBC AUTO-ENTMCNC: 34.2 G/DL (ref 31–36)
MCV RBC AUTO: 87.4 FL (ref 80–100)
PLATELET # BLD AUTO: 240 K/UL (ref 135–450)
PMV BLD AUTO: 8.4 FL (ref 5–10.5)
POTASSIUM SERPL-SCNC: 4.7 MMOL/L (ref 3.5–5.1)
PROT SERPL-MCNC: 7.6 G/DL (ref 6.4–8.2)
RBC # BLD AUTO: 5.48 M/UL (ref 4.2–5.9)
SODIUM SERPL-SCNC: 138 MMOL/L (ref 136–145)
TRIGL SERPL-MCNC: 238 MG/DL (ref 0–150)
VLDLC SERPL CALC-MCNC: 48 MG/DL
WBC # BLD AUTO: 7.7 K/UL (ref 4–11)

## 2023-07-19 LAB
SHBG SERPL-SCNC: 31 NMOL/L (ref 19–76)
TESTOST FREE MFR SERPL: 1.7 % (ref 1.6–2.9)
TESTOST FREE SERPL-MCNC: 28 PG/ML (ref 47–244)
TESTOST SERPL-MCNC: 171 NG/DL (ref 300–720)
TESTOSTERONE.FREE+WB SERPL-MCNC: 91 NG/DL (ref 131–682)

## 2023-07-22 DIAGNOSIS — E11.40 TYPE 2 DIABETES, CONTROLLED, WITH NEUROPATHY (HCC): ICD-10-CM

## 2023-07-22 DIAGNOSIS — E83.52 HYPERCALCEMIA: ICD-10-CM

## 2023-07-22 DIAGNOSIS — E11.21 CONTROLLED TYPE 2 DIABETES MELLITUS WITH DIABETIC NEPHROPATHY, WITH LONG-TERM CURRENT USE OF INSULIN (HCC): ICD-10-CM

## 2023-07-22 DIAGNOSIS — Z79.4 CONTROLLED TYPE 2 DIABETES MELLITUS WITH DIABETIC NEPHROPATHY, WITH LONG-TERM CURRENT USE OF INSULIN (HCC): ICD-10-CM

## 2023-07-22 DIAGNOSIS — E23.0 HYPOGONADOTROPIC HYPOGONADISM (HCC): ICD-10-CM

## 2023-07-22 DIAGNOSIS — E55.9 VITAMIN D DEFICIENCY: ICD-10-CM

## 2023-07-22 DIAGNOSIS — E78.2 MIXED HYPERLIPIDEMIA: ICD-10-CM

## 2023-07-22 DIAGNOSIS — E04.9 GOITER: ICD-10-CM

## 2023-08-08 ENCOUNTER — OFFICE VISIT (OUTPATIENT)
Dept: ENDOCRINOLOGY | Age: 68
End: 2023-08-08

## 2023-08-08 VITALS
DIASTOLIC BLOOD PRESSURE: 67 MMHG | SYSTOLIC BLOOD PRESSURE: 122 MMHG | BODY MASS INDEX: 30.21 KG/M2 | OXYGEN SATURATION: 95 % | TEMPERATURE: 98 F | HEIGHT: 70 IN | HEART RATE: 75 BPM | RESPIRATION RATE: 14 BRPM | WEIGHT: 211 LBS

## 2023-08-08 DIAGNOSIS — E78.2 MIXED HYPERLIPIDEMIA: ICD-10-CM

## 2023-08-08 DIAGNOSIS — E83.52 HYPERCALCEMIA: ICD-10-CM

## 2023-08-08 DIAGNOSIS — E11.21 CONTROLLED TYPE 2 DIABETES MELLITUS WITH DIABETIC NEPHROPATHY, WITH LONG-TERM CURRENT USE OF INSULIN (HCC): ICD-10-CM

## 2023-08-08 DIAGNOSIS — E23.0 HYPOGONADOTROPIC HYPOGONADISM (HCC): ICD-10-CM

## 2023-08-08 DIAGNOSIS — E04.9 GOITER: ICD-10-CM

## 2023-08-08 DIAGNOSIS — E55.9 VITAMIN D DEFICIENCY: ICD-10-CM

## 2023-08-08 DIAGNOSIS — E11.40 TYPE 2 DIABETES, CONTROLLED, WITH NEUROPATHY (HCC): Primary | ICD-10-CM

## 2023-08-08 DIAGNOSIS — Z79.4 CONTROLLED TYPE 2 DIABETES MELLITUS WITH DIABETIC NEPHROPATHY, WITH LONG-TERM CURRENT USE OF INSULIN (HCC): ICD-10-CM

## 2023-08-08 DIAGNOSIS — E66.9 CLASS 1 OBESITY WITH BODY MASS INDEX (BMI) OF 30.0 TO 30.9 IN ADULT, UNSPECIFIED OBESITY TYPE, UNSPECIFIED WHETHER SERIOUS COMORBIDITY PRESENT: ICD-10-CM

## 2023-08-08 PROBLEM — E66.811 CLASS 1 OBESITY WITH SERIOUS COMORBIDITY AND BODY MASS INDEX (BMI) OF 32.0 TO 32.9 IN ADULT: Status: ACTIVE | Noted: 2023-08-08

## 2023-08-08 RX ORDER — LISINOPRIL 10 MG/1
10 TABLET ORAL DAILY
Qty: 90 TABLET | Refills: 1 | Status: SHIPPED | OUTPATIENT
Start: 2023-08-08

## 2023-08-08 RX ORDER — FENOFIBRATE 160 MG/1
160 TABLET ORAL DAILY
Qty: 90 TABLET | Refills: 1 | Status: SHIPPED | OUTPATIENT
Start: 2023-08-08

## 2023-08-08 RX ORDER — SEMAGLUTIDE 0.68 MG/ML
INJECTION, SOLUTION SUBCUTANEOUS
Qty: 6 ML | Refills: 1 | Status: SHIPPED | OUTPATIENT
Start: 2023-08-08

## 2023-08-08 RX ORDER — ICOSAPENT ETHYL 1000 MG/1
CAPSULE ORAL
Qty: 360 CAPSULE | Refills: 1 | Status: SHIPPED | OUTPATIENT
Start: 2023-08-08

## 2023-08-08 RX ORDER — ROSUVASTATIN CALCIUM 20 MG/1
20 TABLET, COATED ORAL DAILY
Qty: 90 TABLET | Refills: 1 | Status: SHIPPED | OUTPATIENT
Start: 2023-08-08

## 2023-08-08 RX ORDER — TESTOSTERONE CYPIONATE 200 MG/ML
INJECTION, SOLUTION INTRAMUSCULAR
Qty: 10 ML | Refills: 1 | Status: SHIPPED | OUTPATIENT
Start: 2023-08-08 | End: 2025-05-27

## 2023-08-08 NOTE — PROGRESS NOTES
Tarik Boo. is a 79 y.o. male who presents for Type 2 diabetes mellitus. Current symptoms/problems include  hyperglycemia  and is unchanged. 1.  Type 2 diabetes, controlled, with neuropathy ([address]) [E11.40]    Diagnosed with Type 2 diabetes mellitus in 2008. Comorbid conditions:  hyperlipidemia and Neuropathy    Current diabetic medications include: metformin, Ozempic, Jardiance    Intolerance to diabetes medications: No     Weight trend: stable  Prior visit with dietician: yes  Current diet: on average, 3 meals per day  Current exercise: frequent     Current monitoring regimen: home blood tests - 1 times daily   Has brought blood glucose log/meter:  No  Home blood sugar records: fasting range:   and postprandial range:   Any episodes of hypoglycemia? Yes  Hypoglycemia frequency and time(s):  Very rare  Does patient have Glucagon emergency kit? No  Does patient have rapid acting carbohydrate? No  Does patient wear a medic alert bracelet or necklace? No    2. Mixed hyperlipidemia  No muscle pain. Has cramps. 3. Hypogonadotropic hypogonadism (HCC)  Has weakness. 4. Vitamin D deficiency  No bone pain. 5. Obesity  Eats healthy, exercises. 6. Goiter  Has difficulty swallowing. Has hoarseness. Aunt had thyroid cancer  No radiation to his neck.     8.  Hypercalcemia  No kidney stones  No numbness, tingling  Walks  Has tingling I f walks 6-7 miles  Not on calcium supplements  On vitamin D  No fractures    Lab Results   Component Value Date    LABA1C 5.9 07/18/2023     Lab Results   Component Value Date    .6 07/18/2023         Past Medical History:   Diagnosis Date    Bilateral swelling of feet     Generalized headaches     Goiter     Hemorrhoids     Hiatal hernia     Hyperlipidemia     Impaired fasting glucose     Neuropathy     weakness bilat LE's worse Right    Other and unspecified anterior pituitary hyperfunction     Other anterior pituitary disorders     Other

## 2023-10-30 DIAGNOSIS — E83.52 HYPERCALCEMIA: ICD-10-CM

## 2023-10-30 DIAGNOSIS — E78.2 MIXED HYPERLIPIDEMIA: ICD-10-CM

## 2023-10-30 DIAGNOSIS — Z79.4 CONTROLLED TYPE 2 DIABETES MELLITUS WITH DIABETIC NEPHROPATHY, WITH LONG-TERM CURRENT USE OF INSULIN (HCC): ICD-10-CM

## 2023-10-30 DIAGNOSIS — E55.9 VITAMIN D DEFICIENCY: ICD-10-CM

## 2023-10-30 DIAGNOSIS — E23.0 HYPOGONADOTROPIC HYPOGONADISM (HCC): ICD-10-CM

## 2023-10-30 DIAGNOSIS — E11.21 CONTROLLED TYPE 2 DIABETES MELLITUS WITH DIABETIC NEPHROPATHY, WITH LONG-TERM CURRENT USE OF INSULIN (HCC): ICD-10-CM

## 2023-10-30 DIAGNOSIS — E11.40 TYPE 2 DIABETES, CONTROLLED, WITH NEUROPATHY (HCC): ICD-10-CM

## 2023-10-30 DIAGNOSIS — E04.9 GOITER: ICD-10-CM

## 2023-10-30 LAB
25(OH)D3 SERPL-MCNC: 53.6 NG/ML
ALBUMIN SERPL-MCNC: 5.1 G/DL (ref 3.4–5)
ALBUMIN/GLOB SERPL: 2.4 {RATIO} (ref 1.1–2.2)
ALP SERPL-CCNC: 60 U/L (ref 40–129)
ALT SERPL-CCNC: 13 U/L (ref 10–40)
ANION GAP SERPL CALCULATED.3IONS-SCNC: 14 MMOL/L (ref 3–16)
AST SERPL-CCNC: 17 U/L (ref 15–37)
BASOPHILS # BLD: 0.1 K/UL (ref 0–0.2)
BASOPHILS NFR BLD: 0.7 %
BILIRUB SERPL-MCNC: 0.4 MG/DL (ref 0–1)
BUN SERPL-MCNC: 26 MG/DL (ref 7–20)
CALCIUM SERPL-MCNC: 9.6 MG/DL (ref 8.3–10.6)
CHLORIDE SERPL-SCNC: 101 MMOL/L (ref 99–110)
CHOLEST SERPL-MCNC: 136 MG/DL (ref 0–199)
CO2 SERPL-SCNC: 23 MMOL/L (ref 21–32)
CREAT SERPL-MCNC: 0.8 MG/DL (ref 0.8–1.3)
DEPRECATED RDW RBC AUTO: 15.4 % (ref 12.4–15.4)
EOSINOPHIL # BLD: 0.2 K/UL (ref 0–0.6)
EOSINOPHIL NFR BLD: 2.4 %
GFR SERPLBLD CREATININE-BSD FMLA CKD-EPI: >60 ML/MIN/{1.73_M2}
GLUCOSE SERPL-MCNC: 123 MG/DL (ref 70–99)
HCT VFR BLD AUTO: 49.6 % (ref 40.5–52.5)
HDLC SERPL-MCNC: 23 MG/DL (ref 40–60)
HGB BLD-MCNC: 16.4 G/DL (ref 13.5–17.5)
LDL CHOLESTEROL CALCULATED: 55 MG/DL
LYMPHOCYTES # BLD: 1.8 K/UL (ref 1–5.1)
LYMPHOCYTES NFR BLD: 20.6 %
MCH RBC QN AUTO: 29.2 PG (ref 26–34)
MCHC RBC AUTO-ENTMCNC: 33.1 G/DL (ref 31–36)
MCV RBC AUTO: 88.3 FL (ref 80–100)
MONOCYTES # BLD: 0.6 K/UL (ref 0–1.3)
MONOCYTES NFR BLD: 6.6 %
NEUTROPHILS # BLD: 6 K/UL (ref 1.7–7.7)
NEUTROPHILS NFR BLD: 69.7 %
PLATELET # BLD AUTO: 252 K/UL (ref 135–450)
PMV BLD AUTO: 8.4 FL (ref 5–10.5)
POTASSIUM SERPL-SCNC: 4.4 MMOL/L (ref 3.5–5.1)
PROT SERPL-MCNC: 7.2 G/DL (ref 6.4–8.2)
RBC # BLD AUTO: 5.62 M/UL (ref 4.2–5.9)
SODIUM SERPL-SCNC: 138 MMOL/L (ref 136–145)
TRIGL SERPL-MCNC: 289 MG/DL (ref 0–150)
VLDLC SERPL CALC-MCNC: 58 MG/DL
WBC # BLD AUTO: 8.6 K/UL (ref 4–11)

## 2023-10-31 LAB
EST. AVERAGE GLUCOSE BLD GHB EST-MCNC: 119.8 MG/DL
HBA1C MFR BLD: 5.8 %
SHBG SERPL-SCNC: 26 NMOL/L (ref 19–76)
TESTOST FREE MFR SERPL: 2.1 % (ref 1.6–2.9)
TESTOST FREE SERPL-MCNC: 154 PG/ML (ref 47–244)
TESTOST SERPL-MCNC: 737 NG/DL (ref 300–720)
TESTOSTERONE.FREE+WB SERPL-MCNC: 476 NG/DL (ref 131–682)

## 2023-11-06 PROBLEM — E66.811 CLASS 1 OBESITY WITH BODY MASS INDEX (BMI) OF 30.0 TO 30.9 IN ADULT: Status: ACTIVE | Noted: 2023-11-06

## 2023-11-06 PROBLEM — E66.9 CLASS 1 OBESITY WITH BODY MASS INDEX (BMI) OF 30.0 TO 30.9 IN ADULT: Status: ACTIVE | Noted: 2023-11-06

## 2023-11-07 ENCOUNTER — TELEPHONE (OUTPATIENT)
Dept: ENDOCRINOLOGY | Age: 68
End: 2023-11-07

## 2023-11-07 NOTE — TELEPHONE ENCOUNTER
Pts wife calling pt had appt today but he was having a stress test and started having chest pain so he is in the emergency room  @ [id number] [facility] [address] (Spouse)   [phone] [facility]

## 2024-01-04 RX ORDER — SEMAGLUTIDE 0.68 MG/ML
INJECTION, SOLUTION SUBCUTANEOUS
Qty: 6 ML | Refills: 0 | Status: SHIPPED | OUTPATIENT
Start: 2024-01-04

## 2024-01-05 ENCOUNTER — TELEPHONE (OUTPATIENT)
Dept: ENDOCRINOLOGY | Age: 69
End: 2024-01-05

## 2024-01-05 NOTE — TELEPHONE ENCOUNTER
Submitted PA for Ozempic  [facility] Key: [id number]   STATUS: Message from Plan  CaseId:[id number];Status:Approved;Review Type:Prior Auth;Coverage Start Date:01/01/2024;Coverage End Date:01/04/2025;

## 2024-01-10 ENCOUNTER — TELEPHONE (OUTPATIENT)
Dept: ENDOCRINOLOGY | Age: 69
End: 2024-01-10

## 2024-01-10 DIAGNOSIS — E78.2 MIXED HYPERLIPIDEMIA: ICD-10-CM

## 2024-01-10 DIAGNOSIS — E23.0 HYPOGONADOTROPIC HYPOGONADISM (HCC): ICD-10-CM

## 2024-01-10 DIAGNOSIS — E55.9 VITAMIN D DEFICIENCY: ICD-10-CM

## 2024-01-10 DIAGNOSIS — E11.40 TYPE 2 DIABETES, CONTROLLED, WITH NEUROPATHY (HCC): Primary | ICD-10-CM

## 2024-02-05 DIAGNOSIS — E55.9 VITAMIN D DEFICIENCY: ICD-10-CM

## 2024-02-05 DIAGNOSIS — E78.2 MIXED HYPERLIPIDEMIA: ICD-10-CM

## 2024-02-05 DIAGNOSIS — E11.40 TYPE 2 DIABETES, CONTROLLED, WITH NEUROPATHY (HCC): ICD-10-CM

## 2024-02-05 DIAGNOSIS — E23.0 HYPOGONADOTROPIC HYPOGONADISM (HCC): ICD-10-CM

## 2024-02-05 LAB
ALBUMIN SERPL-MCNC: 5.2 G/DL (ref 3.4–5)
ALBUMIN/GLOB SERPL: 2.7 {RATIO} (ref 1.1–2.2)
ALP SERPL-CCNC: 54 U/L (ref 40–129)
ALT SERPL-CCNC: 19 U/L (ref 10–40)
ANION GAP SERPL CALCULATED.3IONS-SCNC: 13 MMOL/L (ref 3–16)
AST SERPL-CCNC: 17 U/L (ref 15–37)
BASOPHILS # BLD: 0.1 K/UL (ref 0–0.2)
BASOPHILS NFR BLD: 0.7 %
BILIRUB SERPL-MCNC: <0.2 MG/DL (ref 0–1)
BUN SERPL-MCNC: 21 MG/DL (ref 7–20)
CALCIUM SERPL-MCNC: 9.3 MG/DL (ref 8.3–10.6)
CHLORIDE SERPL-SCNC: 99 MMOL/L (ref 99–110)
CHOLEST SERPL-MCNC: 119 MG/DL (ref 0–199)
CO2 SERPL-SCNC: 26 MMOL/L (ref 21–32)
CREAT SERPL-MCNC: 0.7 MG/DL (ref 0.8–1.3)
DEPRECATED RDW RBC AUTO: 15.7 % (ref 12.4–15.4)
EOSINOPHIL # BLD: 0.1 K/UL (ref 0–0.6)
EOSINOPHIL NFR BLD: 1.6 %
GFR SERPLBLD CREATININE-BSD FMLA CKD-EPI: >60 ML/MIN/{1.73_M2}
GLUCOSE SERPL-MCNC: 115 MG/DL (ref 70–99)
HCT VFR BLD AUTO: 49.2 % (ref 40.5–52.5)
HDLC SERPL-MCNC: 23 MG/DL (ref 40–60)
HGB BLD-MCNC: 16.2 G/DL (ref 13.5–17.5)
LDL CHOLESTEROL CALCULATED: 62 MG/DL
LYMPHOCYTES # BLD: 1.7 K/UL (ref 1–5.1)
LYMPHOCYTES NFR BLD: 22.6 %
MCH RBC QN AUTO: 28.8 PG (ref 26–34)
MCHC RBC AUTO-ENTMCNC: 32.9 G/DL (ref 31–36)
MCV RBC AUTO: 87.6 FL (ref 80–100)
MONOCYTES # BLD: 0.6 K/UL (ref 0–1.3)
MONOCYTES NFR BLD: 7.6 %
NEUTROPHILS # BLD: 5 K/UL (ref 1.7–7.7)
NEUTROPHILS NFR BLD: 67.5 %
PLATELET # BLD AUTO: 239 K/UL (ref 135–450)
PMV BLD AUTO: 8.6 FL (ref 5–10.5)
POTASSIUM SERPL-SCNC: 4.8 MMOL/L (ref 3.5–5.1)
PROT SERPL-MCNC: 7.1 G/DL (ref 6.4–8.2)
RBC # BLD AUTO: 5.62 M/UL (ref 4.2–5.9)
SODIUM SERPL-SCNC: 138 MMOL/L (ref 136–145)
TRIGL SERPL-MCNC: 168 MG/DL (ref 0–150)
VLDLC SERPL CALC-MCNC: 34 MG/DL
WBC # BLD AUTO: 7.4 K/UL (ref 4–11)

## 2024-02-06 LAB
25(OH)D3 SERPL-MCNC: 51.4 NG/ML
EST. AVERAGE GLUCOSE BLD GHB EST-MCNC: 131.2 MG/DL
HBA1C MFR BLD: 6.2 %

## 2024-02-07 LAB
SHBG SERPL-SCNC: 29 NMOL/L (ref 19–76)
TESTOST FREE MFR SERPL: 2.1 % (ref 1.6–2.9)
TESTOST FREE SERPL-MCNC: 184 PG/ML (ref 47–244)
TESTOST SERPL-MCNC: 872 NG/DL (ref 300–720)
TESTOSTERONE.FREE+WB SERPL-MCNC: 546 NG/DL (ref 131–682)

## 2024-02-09 DIAGNOSIS — E11.21 CONTROLLED TYPE 2 DIABETES MELLITUS WITH DIABETIC NEPHROPATHY, WITH LONG-TERM CURRENT USE OF INSULIN (HCC): ICD-10-CM

## 2024-02-09 DIAGNOSIS — E83.52 HYPERCALCEMIA: ICD-10-CM

## 2024-02-09 DIAGNOSIS — E55.9 VITAMIN D DEFICIENCY: ICD-10-CM

## 2024-02-09 DIAGNOSIS — E78.2 MIXED HYPERLIPIDEMIA: ICD-10-CM

## 2024-02-09 DIAGNOSIS — E11.40 TYPE 2 DIABETES, CONTROLLED, WITH NEUROPATHY (HCC): ICD-10-CM

## 2024-02-09 DIAGNOSIS — Z79.4 CONTROLLED TYPE 2 DIABETES MELLITUS WITH DIABETIC NEPHROPATHY, WITH LONG-TERM CURRENT USE OF INSULIN (HCC): ICD-10-CM

## 2024-02-09 DIAGNOSIS — E23.0 HYPOGONADOTROPIC HYPOGONADISM (HCC): ICD-10-CM

## 2024-02-09 DIAGNOSIS — E04.9 GOITER: ICD-10-CM

## 2024-02-11 DIAGNOSIS — E04.9 GOITER: ICD-10-CM

## 2024-02-11 DIAGNOSIS — E83.52 HYPERCALCEMIA: ICD-10-CM

## 2024-02-11 DIAGNOSIS — E55.9 VITAMIN D DEFICIENCY: ICD-10-CM

## 2024-02-11 DIAGNOSIS — E23.0 HYPOGONADOTROPIC HYPOGONADISM (HCC): ICD-10-CM

## 2024-02-11 DIAGNOSIS — Z79.4 CONTROLLED TYPE 2 DIABETES MELLITUS WITH DIABETIC NEPHROPATHY, WITH LONG-TERM CURRENT USE OF INSULIN (HCC): ICD-10-CM

## 2024-02-11 DIAGNOSIS — E78.2 MIXED HYPERLIPIDEMIA: ICD-10-CM

## 2024-02-11 DIAGNOSIS — E11.40 TYPE 2 DIABETES, CONTROLLED, WITH NEUROPATHY (HCC): ICD-10-CM

## 2024-02-11 DIAGNOSIS — E11.21 CONTROLLED TYPE 2 DIABETES MELLITUS WITH DIABETIC NEPHROPATHY, WITH LONG-TERM CURRENT USE OF INSULIN (HCC): ICD-10-CM

## 2024-02-12 RX ORDER — FENOFIBRATE 160 MG/1
160 TABLET ORAL DAILY
Qty: 90 TABLET | Refills: 1 | Status: SHIPPED | OUTPATIENT
Start: 2024-02-12

## 2024-02-13 ENCOUNTER — OFFICE VISIT (OUTPATIENT)
Dept: ENDOCRINOLOGY | Age: 69
End: 2024-02-13
Payer: MEDICARE

## 2024-02-13 VITALS
TEMPERATURE: 98 F | BODY MASS INDEX: 29.35 KG/M2 | WEIGHT: 205 LBS | OXYGEN SATURATION: 96 % | SYSTOLIC BLOOD PRESSURE: 120 MMHG | RESPIRATION RATE: 14 BRPM | HEIGHT: 70 IN | HEART RATE: 68 BPM | DIASTOLIC BLOOD PRESSURE: 64 MMHG

## 2024-02-13 DIAGNOSIS — E78.2 MIXED HYPERLIPIDEMIA: ICD-10-CM

## 2024-02-13 DIAGNOSIS — E11.40 TYPE 2 DIABETES, CONTROLLED, WITH NEUROPATHY (HCC): Primary | ICD-10-CM

## 2024-02-13 DIAGNOSIS — E55.9 VITAMIN D DEFICIENCY: ICD-10-CM

## 2024-02-13 DIAGNOSIS — E23.0 HYPOGONADOTROPIC HYPOGONADISM (HCC): ICD-10-CM

## 2024-02-13 DIAGNOSIS — E04.9 GOITER: ICD-10-CM

## 2024-02-13 DIAGNOSIS — E66.9 CLASS 1 OBESITY WITH BODY MASS INDEX (BMI) OF 30.0 TO 30.9 IN ADULT, UNSPECIFIED OBESITY TYPE, UNSPECIFIED WHETHER SERIOUS COMORBIDITY PRESENT: ICD-10-CM

## 2024-02-13 DIAGNOSIS — E83.52 HYPERCALCEMIA: ICD-10-CM

## 2024-02-13 DIAGNOSIS — Z79.4 CONTROLLED TYPE 2 DIABETES MELLITUS WITH DIABETIC NEPHROPATHY, WITH LONG-TERM CURRENT USE OF INSULIN (HCC): ICD-10-CM

## 2024-02-13 DIAGNOSIS — E11.21 CONTROLLED TYPE 2 DIABETES MELLITUS WITH DIABETIC NEPHROPATHY, WITH LONG-TERM CURRENT USE OF INSULIN (HCC): ICD-10-CM

## 2024-02-13 PROCEDURE — 2022F DILAT RTA XM EVC RTNOPTHY: CPT | Performed by: INTERNAL MEDICINE

## 2024-02-13 PROCEDURE — G8427 DOCREV CUR MEDS BY ELIG CLIN: HCPCS | Performed by: INTERNAL MEDICINE

## 2024-02-13 PROCEDURE — 1036F TOBACCO NON-USER: CPT | Performed by: INTERNAL MEDICINE

## 2024-02-13 PROCEDURE — 3017F COLORECTAL CA SCREEN DOC REV: CPT | Performed by: INTERNAL MEDICINE

## 2024-02-13 PROCEDURE — 3044F HG A1C LEVEL LT 7.0%: CPT | Performed by: INTERNAL MEDICINE

## 2024-02-13 PROCEDURE — G8417 CALC BMI ABV UP PARAM F/U: HCPCS | Performed by: INTERNAL MEDICINE

## 2024-02-13 PROCEDURE — G8484 FLU IMMUNIZE NO ADMIN: HCPCS | Performed by: INTERNAL MEDICINE

## 2024-02-13 PROCEDURE — 1123F ACP DISCUSS/DSCN MKR DOCD: CPT | Performed by: INTERNAL MEDICINE

## 2024-02-13 PROCEDURE — 99214 OFFICE O/P EST MOD 30 MIN: CPT | Performed by: INTERNAL MEDICINE

## 2024-02-13 RX ORDER — SYRINGE WITH NEEDLE, 1 ML 25GX5/8"
1 SYRINGE, EMPTY DISPOSABLE MISCELLANEOUS
Qty: 100 EACH | Refills: 3 | Status: SHIPPED | OUTPATIENT
Start: 2024-02-13

## 2024-02-13 RX ORDER — NITROGLYCERIN 0.4 MG/1
0.4 TABLET SUBLINGUAL EVERY 5 MIN PRN
COMMUNITY
Start: 2024-01-31

## 2024-02-13 RX ORDER — LISINOPRIL 10 MG/1
10 TABLET ORAL DAILY
Qty: 90 TABLET | Refills: 1 | Status: SHIPPED | OUTPATIENT
Start: 2024-02-13

## 2024-02-13 RX ORDER — ROSUVASTATIN CALCIUM 20 MG/1
20 TABLET, COATED ORAL DAILY
Qty: 90 TABLET | Refills: 1 | Status: SHIPPED | OUTPATIENT
Start: 2024-02-13

## 2024-02-13 RX ORDER — CLOPIDOGREL BISULFATE 75 MG/1
75 TABLET ORAL DAILY
COMMUNITY
Start: 2023-12-20

## 2024-02-13 RX ORDER — TESTOSTERONE CYPIONATE 200 MG/ML
INJECTION, SOLUTION INTRAMUSCULAR
Qty: 10 ML | Refills: 1 | Status: SHIPPED | OUTPATIENT
Start: 2024-02-13 | End: 2025-12-02

## 2024-02-13 RX ORDER — SEMAGLUTIDE 0.68 MG/ML
INJECTION, SOLUTION SUBCUTANEOUS
Qty: 6 ML | Refills: 1 | Status: CANCELLED | OUTPATIENT
Start: 2024-02-13

## 2024-02-13 RX ORDER — ICOSAPENT ETHYL 1000 MG/1
CAPSULE ORAL
Qty: 360 CAPSULE | Refills: 1 | Status: SHIPPED | OUTPATIENT
Start: 2024-02-13

## 2024-02-13 NOTE — PROGRESS NOTES
total; Future    4. Vitamin D deficiency  25OH vit D 40-31-67.7-52.3-35.1-50.5-58.3-51.4  Supplements vitamin D 5000 IU daily in winter, every other day in summer  - Vitamin D 25 Hydroxy; Future    5. Obesity  Diet, exercise.    6.  Nephropathy  Follow micr/creat ratio.  Lisinopril 10 mg qd    7. Goiter  2019 Thyroid sono did not show nodules.  Unlikely difficulty swallowing and hoarseness are related to thyroid.  Right lobe normal, left lobe upper limit of normal.  TSH 0.8-1.48-1.12-1.49-1.75    8.  Hypercalcemia  1,25 dihydroxy vitamin D 56.5, normal  Calcium 9.7-10.8-9.8-9.3  PTH 24  Ionized calcium normal  The serum protein electrophoresis is unremarkable.  Calcium 10.8, upper limit normal 10.6  Albumin 5.3  -CMP  - PTH  - Ionized calcium      Reviewed and/or ordered clinical lab results Yes  Reviewed and/or ordered radiology tests No  Reviewed and/or ordered other diagnostic tests No  Discussed test results with performing physician No  Independently reviewed image, tracing, or specimen No  Made a decision to obtain old records Yes  Reviewed and summarized old records Yes  Dx in 2008.  Obtained history from other than patient No    Syd Monsivais Jr. was counseled regarding symptoms of diabetes diagnosis, course and complications of disease if inadequately treated, side effects of medications, diagnosis, treatment options, and prognosis, risks, benefits, complications, and alternatives of treatment, labs, imaging and other studies and treatment targets and goals.  He understands instructions and counseling.      Return in about 4 months (around 6/13/2024) for diabetes.

## 2024-05-31 DIAGNOSIS — E55.9 VITAMIN D DEFICIENCY: ICD-10-CM

## 2024-05-31 DIAGNOSIS — E78.2 MIXED HYPERLIPIDEMIA: ICD-10-CM

## 2024-05-31 DIAGNOSIS — E11.40 TYPE 2 DIABETES, CONTROLLED, WITH NEUROPATHY (HCC): ICD-10-CM

## 2024-05-31 DIAGNOSIS — E11.21 CONTROLLED TYPE 2 DIABETES MELLITUS WITH DIABETIC NEPHROPATHY, WITH LONG-TERM CURRENT USE OF INSULIN (HCC): ICD-10-CM

## 2024-05-31 DIAGNOSIS — E83.52 HYPERCALCEMIA: ICD-10-CM

## 2024-05-31 DIAGNOSIS — E23.0 HYPOGONADOTROPIC HYPOGONADISM (HCC): ICD-10-CM

## 2024-05-31 DIAGNOSIS — Z79.4 CONTROLLED TYPE 2 DIABETES MELLITUS WITH DIABETIC NEPHROPATHY, WITH LONG-TERM CURRENT USE OF INSULIN (HCC): ICD-10-CM

## 2024-05-31 LAB
25(OH)D3 SERPL-MCNC: 58.6 NG/ML
ALBUMIN SERPL-MCNC: 4.8 G/DL (ref 3.4–5)
ALBUMIN/GLOB SERPL: 1.7 {RATIO} (ref 1.1–2.2)
ALP SERPL-CCNC: 75 U/L (ref 40–129)
ALT SERPL-CCNC: 12 U/L (ref 10–40)
ANION GAP SERPL CALCULATED.3IONS-SCNC: 14 MMOL/L (ref 3–16)
AST SERPL-CCNC: 21 U/L (ref 15–37)
BILIRUB SERPL-MCNC: 0.4 MG/DL (ref 0–1)
BUN SERPL-MCNC: 25 MG/DL (ref 7–20)
CALCIUM SERPL-MCNC: 10.3 MG/DL (ref 8.3–10.6)
CHLORIDE SERPL-SCNC: 102 MMOL/L (ref 99–110)
CHOLEST SERPL-MCNC: 124 MG/DL (ref 0–199)
CO2 SERPL-SCNC: 23 MMOL/L (ref 21–32)
CREAT SERPL-MCNC: 0.8 MG/DL (ref 0.8–1.3)
CREAT UR-MCNC: 89.3 MG/DL (ref 39–259)
DEPRECATED RDW RBC AUTO: 15 % (ref 12.4–15.4)
FOLATE SERPL-MCNC: 8.88 NG/ML (ref 4.78–24.2)
GFR SERPLBLD CREATININE-BSD FMLA CKD-EPI: >90 ML/MIN/{1.73_M2}
GLUCOSE SERPL-MCNC: 97 MG/DL (ref 70–99)
HCT VFR BLD AUTO: 51 % (ref 40.5–52.5)
HDLC SERPL-MCNC: 31 MG/DL (ref 40–60)
HGB BLD-MCNC: 17 G/DL (ref 13.5–17.5)
LDL CHOLESTEROL: 45 MG/DL
MCH RBC QN AUTO: 29.7 PG (ref 26–34)
MCHC RBC AUTO-ENTMCNC: 33.4 G/DL (ref 31–36)
MCV RBC AUTO: 88.8 FL (ref 80–100)
MICROALBUMIN UR DL<=1MG/L-MCNC: <1.2 MG/DL
MICROALBUMIN/CREAT UR: NORMAL MG/G (ref 0–30)
PLATELET # BLD AUTO: 250 K/UL (ref 135–450)
PMV BLD AUTO: 8.7 FL (ref 5–10.5)
POTASSIUM SERPL-SCNC: 4.5 MMOL/L (ref 3.5–5.1)
PROT SERPL-MCNC: 7.6 G/DL (ref 6.4–8.2)
RBC # BLD AUTO: 5.74 M/UL (ref 4.2–5.9)
SODIUM SERPL-SCNC: 139 MMOL/L (ref 136–145)
T4 FREE SERPL-MCNC: 1.4 NG/DL (ref 0.9–1.8)
TRIGL SERPL-MCNC: 240 MG/DL (ref 0–150)
TSH SERPL DL<=0.005 MIU/L-ACNC: 0.94 UIU/ML (ref 0.27–4.2)
VIT B12 SERPL-MCNC: 240 PG/ML (ref 211–911)
VLDLC SERPL CALC-MCNC: 48 MG/DL
WBC # BLD AUTO: 9.1 K/UL (ref 4–11)

## 2024-06-01 LAB
EST. AVERAGE GLUCOSE BLD GHB EST-MCNC: 125.5 MG/DL
HBA1C MFR BLD: 6 %

## 2024-06-02 LAB
SHBG SERPL-SCNC: 40 NMOL/L (ref 19–76)
TESTOST FREE MFR SERPL: 1.5 % (ref 1.6–2.9)
TESTOST FREE SERPL-MCNC: 55 PG/ML (ref 47–244)
TESTOST SERPL-MCNC: 360 NG/DL (ref 300–720)
TESTOSTERONE.FREE+WB SERPL-MCNC: 174 NG/DL (ref 131–682)

## 2024-06-06 ENCOUNTER — OFFICE VISIT (OUTPATIENT)
Dept: ENDOCRINOLOGY | Age: 69
End: 2024-06-06

## 2024-06-06 VITALS
TEMPERATURE: 98 F | BODY MASS INDEX: 28.06 KG/M2 | SYSTOLIC BLOOD PRESSURE: 123 MMHG | OXYGEN SATURATION: 99 % | HEIGHT: 70 IN | HEART RATE: 68 BPM | WEIGHT: 196 LBS | RESPIRATION RATE: 14 BRPM | DIASTOLIC BLOOD PRESSURE: 63 MMHG

## 2024-06-06 DIAGNOSIS — E55.9 VITAMIN D DEFICIENCY: ICD-10-CM

## 2024-06-06 DIAGNOSIS — E11.21 CONTROLLED TYPE 2 DIABETES MELLITUS WITH DIABETIC NEPHROPATHY, WITH LONG-TERM CURRENT USE OF INSULIN (HCC): ICD-10-CM

## 2024-06-06 DIAGNOSIS — E83.52 HYPERCALCEMIA: ICD-10-CM

## 2024-06-06 DIAGNOSIS — E04.9 GOITER: ICD-10-CM

## 2024-06-06 DIAGNOSIS — E11.40 TYPE 2 DIABETES, CONTROLLED, WITH NEUROPATHY (HCC): Primary | ICD-10-CM

## 2024-06-06 DIAGNOSIS — E78.2 MIXED HYPERLIPIDEMIA: ICD-10-CM

## 2024-06-06 DIAGNOSIS — E66.3 OVERWEIGHT (BMI 25.0-29.9): ICD-10-CM

## 2024-06-06 DIAGNOSIS — Z79.4 CONTROLLED TYPE 2 DIABETES MELLITUS WITH DIABETIC NEPHROPATHY, WITH LONG-TERM CURRENT USE OF INSULIN (HCC): ICD-10-CM

## 2024-06-06 DIAGNOSIS — E23.0 HYPOGONADOTROPIC HYPOGONADISM (HCC): ICD-10-CM

## 2024-06-06 RX ORDER — ICOSAPENT ETHYL 1 G/1
CAPSULE ORAL
Qty: 360 CAPSULE | Refills: 1 | Status: SHIPPED | OUTPATIENT
Start: 2024-06-06

## 2024-06-06 RX ORDER — LISINOPRIL 10 MG/1
10 TABLET ORAL DAILY
Qty: 90 TABLET | Refills: 1 | Status: SHIPPED | OUTPATIENT
Start: 2024-06-06

## 2024-06-06 RX ORDER — TESTOSTERONE CYPIONATE 200 MG/ML
INJECTION, SOLUTION INTRAMUSCULAR
Qty: 10 ML | Refills: 1 | Status: SHIPPED | OUTPATIENT
Start: 2024-06-06 | End: 2026-03-26

## 2024-06-06 RX ORDER — SYRINGE WITH NEEDLE, 1 ML 25GX5/8"
1 SYRINGE, EMPTY DISPOSABLE MISCELLANEOUS
Qty: 100 EACH | Refills: 3 | Status: SHIPPED | OUTPATIENT
Start: 2024-06-06

## 2024-06-06 RX ORDER — ROSUVASTATIN CALCIUM 20 MG/1
20 TABLET, COATED ORAL DAILY
Qty: 90 TABLET | Refills: 1 | Status: SHIPPED | OUTPATIENT
Start: 2024-06-06

## 2024-06-06 RX ORDER — FENOFIBRATE 160 MG/1
160 TABLET ORAL DAILY
Qty: 90 TABLET | Refills: 1 | Status: SHIPPED | OUTPATIENT
Start: 2024-06-06

## 2024-06-06 NOTE — PROGRESS NOTES
Syd Monsivais Jr. is a 68 y.o. male who presents for Type 2 diabetes mellitus.     Current symptoms/problems include  hyperglycemia  and is unchanged.     1.  Type 2 diabetes, controlled, with neuropathy (HCC) [E11.40]    Diagnosed with Type 2 diabetes mellitus in 2008.     Comorbid conditions:  hyperlipidemia and Neuropathy    Current diabetic medications include: metformin, Ozempic, Jardiance    Intolerance to diabetes medications: No     Weight trend: stable  Prior visit with dietician: yes  Current diet: on average, 3 meals per day  Current exercise: frequent     Current monitoring regimen: home blood tests - 1 times daily   Has brought blood glucose log/meter:  No  Home blood sugar records: fasting range:   and postprandial range:   Any episodes of hypoglycemia? Yes  Hypoglycemia frequency and time(s):  Very rare  Does patient have Glucagon emergency kit? No  Does patient have rapid acting carbohydrate?  No  Does patient wear a medic alert bracelet or necklace?  No    2. Mixed hyperlipidemia  No muscle pain. Has cramps.    3. Hypogonadotropic hypogonadism (HCC)  Has weakness.    4. Vitamin D deficiency  No bone pain.    5. Obesity  Eats healthy, exercises.    6. Goiter  Has difficulty swallowing.  Has hoarseness.  Aunt had thyroid cancer  No radiation to his neck.    8.  Hypercalcemia  No kidney stones  No numbness, tingling  Walks  Has tingling I f walks 6-7 miles  Not on calcium supplements  On vitamin D  No fractures    Lab Results   Component Value Date    LABA1C 6.0 05/31/2024     Lab Results   Component Value Date    .5 05/31/2024         Past Medical History:   Diagnosis Date    Bilateral swelling of feet     Generalized headaches     Goiter     Heart attack (HCC) 2023    Hemorrhoids     Hiatal hernia     Hyperlipidemia     Impaired fasting glucose     Neuropathy     weakness bilat LE's worse Right    Other and unspecified anterior pituitary hyperfunction     Other anterior

## 2024-06-10 ENCOUNTER — TELEPHONE (OUTPATIENT)
Dept: ENDOCRINOLOGY | Age: 69
End: 2024-06-10

## 2024-06-10 DIAGNOSIS — E83.52 HYPERCALCEMIA: ICD-10-CM

## 2024-06-10 DIAGNOSIS — E11.40 TYPE 2 DIABETES, CONTROLLED, WITH NEUROPATHY (HCC): ICD-10-CM

## 2024-06-10 DIAGNOSIS — E11.21 CONTROLLED TYPE 2 DIABETES MELLITUS WITH DIABETIC NEPHROPATHY, WITH LONG-TERM CURRENT USE OF INSULIN (HCC): ICD-10-CM

## 2024-06-10 DIAGNOSIS — E66.3 OVERWEIGHT (BMI 25.0-29.9): ICD-10-CM

## 2024-06-10 DIAGNOSIS — E23.0 HYPOGONADOTROPIC HYPOGONADISM (HCC): ICD-10-CM

## 2024-06-10 DIAGNOSIS — E04.9 GOITER: ICD-10-CM

## 2024-06-10 DIAGNOSIS — Z79.4 CONTROLLED TYPE 2 DIABETES MELLITUS WITH DIABETIC NEPHROPATHY, WITH LONG-TERM CURRENT USE OF INSULIN (HCC): ICD-10-CM

## 2024-06-10 DIAGNOSIS — E78.2 MIXED HYPERLIPIDEMIA: ICD-10-CM

## 2024-06-10 DIAGNOSIS — E55.9 VITAMIN D DEFICIENCY: ICD-10-CM

## 2024-06-10 RX ORDER — ROSUVASTATIN CALCIUM 40 MG/1
40 TABLET, COATED ORAL DAILY
Qty: 90 TABLET | Refills: 1 | Status: SHIPPED | OUTPATIENT
Start: 2024-06-10

## 2024-06-10 NOTE — TELEPHONE ENCOUNTER
Pharmacy called needs rx confirmed due to pt stating he was getting 40mg not 20mg       rosuvastatin (CRESTOR) 20 MG tablet       [id number] - [place], [place] - [address] - P [phone] - F [phone]  [address] [address]  Phone: [phone]  Fax: [phone]

## 2024-09-27 DIAGNOSIS — E83.52 HYPERCALCEMIA: ICD-10-CM

## 2024-09-27 DIAGNOSIS — Z79.4 CONTROLLED TYPE 2 DIABETES MELLITUS WITH DIABETIC NEPHROPATHY, WITH LONG-TERM CURRENT USE OF INSULIN (HCC): ICD-10-CM

## 2024-09-27 DIAGNOSIS — E11.40 TYPE 2 DIABETES, CONTROLLED, WITH NEUROPATHY (HCC): ICD-10-CM

## 2024-09-27 DIAGNOSIS — E78.2 MIXED HYPERLIPIDEMIA: ICD-10-CM

## 2024-09-27 DIAGNOSIS — E55.9 VITAMIN D DEFICIENCY: ICD-10-CM

## 2024-09-27 DIAGNOSIS — E11.21 CONTROLLED TYPE 2 DIABETES MELLITUS WITH DIABETIC NEPHROPATHY, WITH LONG-TERM CURRENT USE OF INSULIN (HCC): ICD-10-CM

## 2024-09-27 DIAGNOSIS — E66.3 OVERWEIGHT (BMI 25.0-29.9): ICD-10-CM

## 2024-09-27 DIAGNOSIS — E23.0 HYPOGONADOTROPIC HYPOGONADISM (HCC): ICD-10-CM

## 2024-09-27 DIAGNOSIS — E04.9 GOITER: ICD-10-CM

## 2024-09-27 LAB
25(OH)D3 SERPL-MCNC: 56.6 NG/ML
ALBUMIN SERPL-MCNC: 5 G/DL (ref 3.4–5)
ALBUMIN/GLOB SERPL: 2.2 {RATIO} (ref 1.1–2.2)
ALP SERPL-CCNC: 62 U/L (ref 40–129)
ALT SERPL-CCNC: 15 U/L (ref 10–40)
ANION GAP SERPL CALCULATED.3IONS-SCNC: 13 MMOL/L (ref 3–16)
AST SERPL-CCNC: 22 U/L (ref 15–37)
BILIRUB SERPL-MCNC: 0.4 MG/DL (ref 0–1)
BUN SERPL-MCNC: 23 MG/DL (ref 7–20)
CALCIUM SERPL-MCNC: 10.5 MG/DL (ref 8.3–10.6)
CHLORIDE SERPL-SCNC: 99 MMOL/L (ref 99–110)
CHOLEST SERPL-MCNC: 112 MG/DL (ref 0–199)
CO2 SERPL-SCNC: 26 MMOL/L (ref 21–32)
CREAT SERPL-MCNC: 0.9 MG/DL (ref 0.8–1.3)
DEPRECATED RDW RBC AUTO: 15 % (ref 12.4–15.4)
FOLATE SERPL-MCNC: 7.1 NG/ML (ref 4.78–24.2)
GFR SERPLBLD CREATININE-BSD FMLA CKD-EPI: >90 ML/MIN/{1.73_M2}
GLUCOSE SERPL-MCNC: 96 MG/DL (ref 70–99)
HCT VFR BLD AUTO: 47.7 % (ref 40.5–52.5)
HDLC SERPL-MCNC: 26 MG/DL (ref 40–60)
HGB BLD-MCNC: 16.1 G/DL (ref 13.5–17.5)
LDLC SERPL CALC-MCNC: 46 MG/DL
MCH RBC QN AUTO: 29.6 PG (ref 26–34)
MCHC RBC AUTO-ENTMCNC: 33.7 G/DL (ref 31–36)
MCV RBC AUTO: 87.9 FL (ref 80–100)
PLATELET # BLD AUTO: 243 K/UL (ref 135–450)
PMV BLD AUTO: 8.5 FL (ref 5–10.5)
POTASSIUM SERPL-SCNC: 4.6 MMOL/L (ref 3.5–5.1)
PROT SERPL-MCNC: 7.3 G/DL (ref 6.4–8.2)
RBC # BLD AUTO: 5.43 M/UL (ref 4.2–5.9)
SODIUM SERPL-SCNC: 138 MMOL/L (ref 136–145)
TRIGL SERPL-MCNC: 200 MG/DL (ref 0–150)
VIT B12 SERPL-MCNC: 545 PG/ML (ref 211–911)
VLDLC SERPL CALC-MCNC: 40 MG/DL
WBC # BLD AUTO: 7.1 K/UL (ref 4–11)

## 2024-09-28 LAB
EST. AVERAGE GLUCOSE BLD GHB EST-MCNC: 122.6 MG/DL
HBA1C MFR BLD: 5.9 %

## 2024-09-30 LAB
SHBG SERPL-SCNC: 38 NMOL/L (ref 19–76)
TESTOST FREE MFR SERPL: ABNORMAL % (ref 1.6–2.9)
TESTOST FREE SERPL-MCNC: ABNORMAL PG/ML (ref 47–244)
TESTOST SERPL-MCNC: >1500 NG/DL (ref 300–720)
TESTOSTERONE.FREE+WB SERPL-MCNC: ABNORMAL NG/DL (ref 131–682)

## 2024-10-04 ENCOUNTER — TELEPHONE (OUTPATIENT)
Dept: ENDOCRINOLOGY | Age: 69
End: 2024-10-04

## 2024-10-05 NOTE — TELEPHONE ENCOUNTER
Please inform patient to stop testosterone until appointment, will need to recheck level in a month.  Too high level,>1500.

## 2024-10-08 ENCOUNTER — OFFICE VISIT (OUTPATIENT)
Dept: ENDOCRINOLOGY | Age: 69
End: 2024-10-08
Payer: MEDICARE

## 2024-10-08 VITALS
HEIGHT: 70 IN | BODY MASS INDEX: 27.92 KG/M2 | WEIGHT: 195 LBS | TEMPERATURE: 98 F | RESPIRATION RATE: 14 BRPM | OXYGEN SATURATION: 95 % | SYSTOLIC BLOOD PRESSURE: 134 MMHG | HEART RATE: 74 BPM | DIASTOLIC BLOOD PRESSURE: 81 MMHG

## 2024-10-08 DIAGNOSIS — Z79.4 CONTROLLED TYPE 2 DIABETES MELLITUS WITH DIABETIC NEPHROPATHY, WITH LONG-TERM CURRENT USE OF INSULIN (HCC): ICD-10-CM

## 2024-10-08 DIAGNOSIS — E23.0 HYPOGONADOTROPIC HYPOGONADISM (HCC): ICD-10-CM

## 2024-10-08 DIAGNOSIS — E55.9 VITAMIN D DEFICIENCY: ICD-10-CM

## 2024-10-08 DIAGNOSIS — E78.2 MIXED HYPERLIPIDEMIA: ICD-10-CM

## 2024-10-08 DIAGNOSIS — E11.21 CONTROLLED TYPE 2 DIABETES MELLITUS WITH DIABETIC NEPHROPATHY, WITH LONG-TERM CURRENT USE OF INSULIN (HCC): ICD-10-CM

## 2024-10-08 DIAGNOSIS — E83.52 HYPERCALCEMIA: ICD-10-CM

## 2024-10-08 DIAGNOSIS — E11.40 TYPE 2 DIABETES, CONTROLLED, WITH NEUROPATHY (HCC): Primary | ICD-10-CM

## 2024-10-08 DIAGNOSIS — E66.3 OVERWEIGHT (BMI 25.0-29.9): ICD-10-CM

## 2024-10-08 DIAGNOSIS — E04.9 GOITER: ICD-10-CM

## 2024-10-08 PROCEDURE — 99214 OFFICE O/P EST MOD 30 MIN: CPT | Performed by: INTERNAL MEDICINE

## 2024-10-08 PROCEDURE — 1036F TOBACCO NON-USER: CPT | Performed by: INTERNAL MEDICINE

## 2024-10-08 PROCEDURE — 3044F HG A1C LEVEL LT 7.0%: CPT | Performed by: INTERNAL MEDICINE

## 2024-10-08 PROCEDURE — 3017F COLORECTAL CA SCREEN DOC REV: CPT | Performed by: INTERNAL MEDICINE

## 2024-10-08 PROCEDURE — G8417 CALC BMI ABV UP PARAM F/U: HCPCS | Performed by: INTERNAL MEDICINE

## 2024-10-08 PROCEDURE — G2211 COMPLEX E/M VISIT ADD ON: HCPCS | Performed by: INTERNAL MEDICINE

## 2024-10-08 PROCEDURE — G8427 DOCREV CUR MEDS BY ELIG CLIN: HCPCS | Performed by: INTERNAL MEDICINE

## 2024-10-08 PROCEDURE — 1123F ACP DISCUSS/DSCN MKR DOCD: CPT | Performed by: INTERNAL MEDICINE

## 2024-10-08 PROCEDURE — G8484 FLU IMMUNIZE NO ADMIN: HCPCS | Performed by: INTERNAL MEDICINE

## 2024-10-08 PROCEDURE — 2022F DILAT RTA XM EVC RTNOPTHY: CPT | Performed by: INTERNAL MEDICINE

## 2024-10-08 RX ORDER — FENOFIBRATE 160 MG/1
160 TABLET ORAL DAILY
Qty: 90 TABLET | Refills: 1 | Status: SHIPPED | OUTPATIENT
Start: 2024-10-08

## 2024-10-08 RX ORDER — ICOSAPENT ETHYL 1 G/1
CAPSULE ORAL
Qty: 360 CAPSULE | Refills: 1 | Status: SHIPPED | OUTPATIENT
Start: 2024-10-08

## 2024-10-08 RX ORDER — TESTOSTERONE CYPIONATE 200 MG/ML
INJECTION, SOLUTION INTRAMUSCULAR
Qty: 10 ML | Refills: 1 | Status: SHIPPED | OUTPATIENT
Start: 2024-10-08 | End: 2026-07-28

## 2024-10-08 RX ORDER — ROSUVASTATIN CALCIUM 40 MG/1
40 TABLET, COATED ORAL DAILY
Qty: 90 TABLET | Refills: 1 | Status: SHIPPED | OUTPATIENT
Start: 2024-10-08

## 2024-10-08 RX ORDER — LISINOPRIL 10 MG/1
10 TABLET ORAL DAILY
Qty: 90 TABLET | Refills: 1 | Status: SHIPPED | OUTPATIENT
Start: 2024-10-08

## 2024-10-08 RX ORDER — SYRINGE WITH NEEDLE, 1 ML 25GX5/8"
1 SYRINGE, EMPTY DISPOSABLE MISCELLANEOUS
Qty: 100 EACH | Refills: 3 | Status: SHIPPED | OUTPATIENT
Start: 2024-10-08

## 2024-10-08 NOTE — PROGRESS NOTES
Syd Monsivais Jr. is a 69 y.o. male who presents for Type 2 diabetes mellitus.     Current symptoms/problems include  hyperglycemia  and is unchanged.     1.  Type 2 diabetes, controlled, with neuropathy (HCC) [E11.40]    Diagnosed with Type 2 diabetes mellitus in 2008.     Comorbid conditions:  hyperlipidemia and Neuropathy    Current diabetic medications include: metformin, Ozempic, Jardiance    Intolerance to diabetes medications: No     Weight trend: stable  Prior visit with dietician: yes  Current diet: on average, 3 meals per day  Current exercise: frequent     Current monitoring regimen: home blood tests - 1 times daily   Has brought blood glucose log/meter:  No  Home blood sugar records: fasting range:   and postprandial range:   Any episodes of hypoglycemia? Yes  Hypoglycemia frequency and time(s):  Very rare  Does patient have Glucagon emergency kit? No  Does patient have rapid acting carbohydrate?  No  Does patient wear a medic alert bracelet or necklace?  No    2. Mixed hyperlipidemia  No muscle pain. Has cramps.    3. Hypogonadotropic hypogonadism (HCC)  Has weakness.    4. Vitamin D deficiency  No bone pain.    5. Overweight  Eats healthy, exercises.    6. Goiter  Has difficulty swallowing.  Has hoarseness.  Aunt had thyroid cancer  No radiation to his neck.    8.  Hypercalcemia  No kidney stones  No numbness, tingling  Walks  Has tingling if walks 6-7 miles  Not on calcium supplements  On vitamin D  No fractures      Bethel Abernathy MD - 08/21/2019  Formatting of this note might be different from the original.  EXAM: US-THYROID/NECK/HEAD    INDICATION: Goiter    COMPARISON: None    TECHNIQUE: Multiplanar grayscale analysis of the thyroid was performed.    FINDINGS:    The right lobe measures 4.0 x 1.3 x 1.3 cm.  The left lobe measures 4.7 x 1.3 x 1.2 cm.  The isthmus measures 4 mm in anteroposterior dimension.    The thyroid gland appears diffusely heterogeneous in echotexture,

## 2024-10-23 ENCOUNTER — HOSPITAL ENCOUNTER (OUTPATIENT)
Dept: ULTRASOUND IMAGING | Age: 69
Discharge: HOME | End: 2024-10-23
Attending: INTERNAL MEDICINE
Payer: MEDICARE

## 2024-10-23 DIAGNOSIS — E04.9 GOITER: ICD-10-CM

## 2024-10-23 PROCEDURE — 76536 US EXAM OF HEAD AND NECK: CPT

## 2024-10-28 ENCOUNTER — TELEPHONE (OUTPATIENT)
Dept: ENDOCRINOLOGY | Age: 69
End: 2024-10-28

## 2024-10-28 NOTE — TELEPHONE ENCOUNTER
Please inform patient that thyroid ultrasound showed small 0.9 cm left thyroid nodule.  Radiologist is saying that the nodule was there before and does not show significant change.  Thyroid is not enlarged, therefore unlikely causing any symptoms in the neck.

## 2024-11-01 DIAGNOSIS — E23.0 HYPOGONADOTROPIC HYPOGONADISM (HCC): ICD-10-CM

## 2024-11-04 LAB
SHBG SERPL-SCNC: 40 NMOL/L (ref 19–76)
TESTOST FREE MFR SERPL: 1.5 % (ref 1.6–2.9)
TESTOST FREE SERPL-MCNC: 12 PG/ML (ref 47–244)
TESTOST SERPL-MCNC: 83 NG/DL (ref 300–720)
TESTOSTERONE.FREE+WB SERPL-MCNC: 35 NG/DL (ref 131–682)

## 2025-01-03 DIAGNOSIS — E66.3 OVERWEIGHT (BMI 25.0-29.9): ICD-10-CM

## 2025-01-03 DIAGNOSIS — E11.40 TYPE 2 DIABETES, CONTROLLED, WITH NEUROPATHY (HCC): ICD-10-CM

## 2025-01-03 DIAGNOSIS — E04.9 GOITER: ICD-10-CM

## 2025-01-03 DIAGNOSIS — E55.9 VITAMIN D DEFICIENCY: ICD-10-CM

## 2025-01-03 DIAGNOSIS — Z79.4 CONTROLLED TYPE 2 DIABETES MELLITUS WITH DIABETIC NEPHROPATHY, WITH LONG-TERM CURRENT USE OF INSULIN (HCC): ICD-10-CM

## 2025-01-03 DIAGNOSIS — E83.52 HYPERCALCEMIA: ICD-10-CM

## 2025-01-03 DIAGNOSIS — E78.2 MIXED HYPERLIPIDEMIA: ICD-10-CM

## 2025-01-03 DIAGNOSIS — E11.21 CONTROLLED TYPE 2 DIABETES MELLITUS WITH DIABETIC NEPHROPATHY, WITH LONG-TERM CURRENT USE OF INSULIN (HCC): ICD-10-CM

## 2025-01-03 DIAGNOSIS — E23.0 HYPOGONADOTROPIC HYPOGONADISM (HCC): ICD-10-CM

## 2025-01-30 DIAGNOSIS — E11.21 CONTROLLED TYPE 2 DIABETES MELLITUS WITH DIABETIC NEPHROPATHY, WITH LONG-TERM CURRENT USE OF INSULIN (HCC): ICD-10-CM

## 2025-01-30 DIAGNOSIS — E55.9 VITAMIN D DEFICIENCY: ICD-10-CM

## 2025-01-30 DIAGNOSIS — Z79.4 CONTROLLED TYPE 2 DIABETES MELLITUS WITH DIABETIC NEPHROPATHY, WITH LONG-TERM CURRENT USE OF INSULIN (HCC): ICD-10-CM

## 2025-01-30 DIAGNOSIS — E11.40 TYPE 2 DIABETES, CONTROLLED, WITH NEUROPATHY (HCC): ICD-10-CM

## 2025-01-30 DIAGNOSIS — E23.0 HYPOGONADOTROPIC HYPOGONADISM (HCC): ICD-10-CM

## 2025-01-30 DIAGNOSIS — E78.2 MIXED HYPERLIPIDEMIA: ICD-10-CM

## 2025-01-30 DIAGNOSIS — E83.52 HYPERCALCEMIA: ICD-10-CM

## 2025-01-30 LAB
25(OH)D3 SERPL-MCNC: 42.6 NG/ML
ALBUMIN SERPL-MCNC: 5 G/DL (ref 3.4–5)
ALBUMIN/GLOB SERPL: 2.1 {RATIO} (ref 1.1–2.2)
ALP SERPL-CCNC: 63 U/L (ref 40–129)
ALT SERPL-CCNC: 10 U/L (ref 10–40)
ANION GAP SERPL CALCULATED.3IONS-SCNC: 9 MMOL/L (ref 3–16)
AST SERPL-CCNC: 20 U/L (ref 15–37)
BILIRUB SERPL-MCNC: 0.5 MG/DL (ref 0–1)
BUN SERPL-MCNC: 26 MG/DL (ref 7–20)
CALCIUM SERPL-MCNC: 10.1 MG/DL (ref 8.3–10.6)
CHLORIDE SERPL-SCNC: 103 MMOL/L (ref 99–110)
CHOLEST SERPL-MCNC: 90 MG/DL (ref 0–199)
CO2 SERPL-SCNC: 28 MMOL/L (ref 21–32)
CREAT SERPL-MCNC: 1 MG/DL (ref 0.8–1.3)
DEPRECATED RDW RBC AUTO: 14.8 % (ref 12.4–15.4)
EST. AVERAGE GLUCOSE BLD GHB EST-MCNC: 125.5 MG/DL
FOLATE SERPL-MCNC: 8.54 NG/ML (ref 4.78–24.2)
GFR SERPLBLD CREATININE-BSD FMLA CKD-EPI: 82 ML/MIN/{1.73_M2}
GLUCOSE SERPL-MCNC: 122 MG/DL (ref 70–99)
HBA1C MFR BLD: 6 %
HCT VFR BLD AUTO: 48.8 % (ref 40.5–52.5)
HDLC SERPL-MCNC: 27 MG/DL (ref 40–60)
HGB BLD-MCNC: 16 G/DL (ref 13.5–17.5)
LDL CHOLESTEROL: 37 MG/DL
MCH RBC QN AUTO: 29.9 PG (ref 26–34)
MCHC RBC AUTO-ENTMCNC: 32.8 G/DL (ref 31–36)
MCV RBC AUTO: 91.2 FL (ref 80–100)
PLATELET # BLD AUTO: 247 K/UL (ref 135–450)
PMV BLD AUTO: 8.6 FL (ref 5–10.5)
POTASSIUM SERPL-SCNC: 5 MMOL/L (ref 3.5–5.1)
PROT SERPL-MCNC: 7.4 G/DL (ref 6.4–8.2)
RBC # BLD AUTO: 5.35 M/UL (ref 4.2–5.9)
SODIUM SERPL-SCNC: 140 MMOL/L (ref 136–145)
T4 FREE SERPL-MCNC: 1.3 NG/DL (ref 0.9–1.8)
TRIGL SERPL-MCNC: 129 MG/DL (ref 0–150)
TSH SERPL DL<=0.005 MIU/L-ACNC: 1.18 UIU/ML (ref 0.27–4.2)
VIT B12 SERPL-MCNC: 967 PG/ML (ref 211–911)
VLDLC SERPL CALC-MCNC: 26 MG/DL
WBC # BLD AUTO: 8 K/UL (ref 4–11)

## 2025-02-01 LAB
SHBG SERPL-SCNC: 34 NMOL/L (ref 19–76)
TESTOST FREE MFR SERPL: 1.7 % (ref 1.6–2.9)
TESTOST FREE SERPL-MCNC: 42 PG/ML (ref 47–244)
TESTOST SERPL-MCNC: 254 NG/DL (ref 300–720)
TESTOSTERONE.FREE+WB SERPL-MCNC: 129 NG/DL (ref 131–682)

## 2025-02-06 ENCOUNTER — OFFICE VISIT (OUTPATIENT)
Dept: ENDOCRINOLOGY | Age: 70
End: 2025-02-06
Payer: MEDICARE

## 2025-02-06 VITALS
SYSTOLIC BLOOD PRESSURE: 114 MMHG | OXYGEN SATURATION: 94 % | RESPIRATION RATE: 14 BRPM | WEIGHT: 193 LBS | HEIGHT: 70 IN | DIASTOLIC BLOOD PRESSURE: 67 MMHG | HEART RATE: 67 BPM | TEMPERATURE: 98 F | BODY MASS INDEX: 27.63 KG/M2

## 2025-02-06 DIAGNOSIS — E83.52 HYPERCALCEMIA: ICD-10-CM

## 2025-02-06 DIAGNOSIS — E55.9 VITAMIN D DEFICIENCY: ICD-10-CM

## 2025-02-06 DIAGNOSIS — E78.2 MIXED HYPERLIPIDEMIA: ICD-10-CM

## 2025-02-06 DIAGNOSIS — E11.21 CONTROLLED TYPE 2 DIABETES MELLITUS WITH DIABETIC NEPHROPATHY, WITH LONG-TERM CURRENT USE OF INSULIN (HCC): ICD-10-CM

## 2025-02-06 DIAGNOSIS — E66.3 OVERWEIGHT (BMI 25.0-29.9): ICD-10-CM

## 2025-02-06 DIAGNOSIS — Z79.4 CONTROLLED TYPE 2 DIABETES MELLITUS WITH DIABETIC NEPHROPATHY, WITH LONG-TERM CURRENT USE OF INSULIN (HCC): ICD-10-CM

## 2025-02-06 DIAGNOSIS — E04.9 GOITER: ICD-10-CM

## 2025-02-06 DIAGNOSIS — E23.0 HYPOGONADOTROPIC HYPOGONADISM (HCC): ICD-10-CM

## 2025-02-06 DIAGNOSIS — E11.40 TYPE 2 DIABETES, CONTROLLED, WITH NEUROPATHY (HCC): Primary | ICD-10-CM

## 2025-02-06 PROCEDURE — G8417 CALC BMI ABV UP PARAM F/U: HCPCS | Performed by: INTERNAL MEDICINE

## 2025-02-06 PROCEDURE — 1159F MED LIST DOCD IN RCRD: CPT | Performed by: INTERNAL MEDICINE

## 2025-02-06 PROCEDURE — G2211 COMPLEX E/M VISIT ADD ON: HCPCS | Performed by: INTERNAL MEDICINE

## 2025-02-06 PROCEDURE — 2022F DILAT RTA XM EVC RTNOPTHY: CPT | Performed by: INTERNAL MEDICINE

## 2025-02-06 PROCEDURE — 3044F HG A1C LEVEL LT 7.0%: CPT | Performed by: INTERNAL MEDICINE

## 2025-02-06 PROCEDURE — G8427 DOCREV CUR MEDS BY ELIG CLIN: HCPCS | Performed by: INTERNAL MEDICINE

## 2025-02-06 PROCEDURE — 1036F TOBACCO NON-USER: CPT | Performed by: INTERNAL MEDICINE

## 2025-02-06 PROCEDURE — 1123F ACP DISCUSS/DSCN MKR DOCD: CPT | Performed by: INTERNAL MEDICINE

## 2025-02-06 PROCEDURE — 1160F RVW MEDS BY RX/DR IN RCRD: CPT | Performed by: INTERNAL MEDICINE

## 2025-02-06 PROCEDURE — 3017F COLORECTAL CA SCREEN DOC REV: CPT | Performed by: INTERNAL MEDICINE

## 2025-02-06 PROCEDURE — 99214 OFFICE O/P EST MOD 30 MIN: CPT | Performed by: INTERNAL MEDICINE

## 2025-02-06 RX ORDER — ROSUVASTATIN CALCIUM 40 MG/1
40 TABLET, COATED ORAL DAILY
Qty: 90 TABLET | Refills: 1 | Status: SHIPPED | OUTPATIENT
Start: 2025-02-06

## 2025-02-06 RX ORDER — ICOSAPENT ETHYL 1 G/1
CAPSULE ORAL
Qty: 360 CAPSULE | Refills: 1 | Status: SHIPPED | OUTPATIENT
Start: 2025-02-06

## 2025-02-06 RX ORDER — SYRINGE WITH NEEDLE, 1 ML 25GX5/8"
1 SYRINGE, EMPTY DISPOSABLE MISCELLANEOUS
Qty: 100 EACH | Refills: 3 | Status: SHIPPED | OUTPATIENT
Start: 2025-02-06

## 2025-02-06 RX ORDER — TESTOSTERONE CYPIONATE 200 MG/ML
INJECTION, SOLUTION INTRAMUSCULAR
Qty: 10 ML | Refills: 1 | Status: SHIPPED | OUTPATIENT
Start: 2025-02-06 | End: 2026-11-26

## 2025-02-06 RX ORDER — LISINOPRIL 10 MG/1
10 TABLET ORAL DAILY
Qty: 90 TABLET | Refills: 1 | Status: SHIPPED | OUTPATIENT
Start: 2025-02-06

## 2025-02-06 RX ORDER — FENOFIBRATE 160 MG/1
160 TABLET ORAL DAILY
Qty: 90 TABLET | Refills: 1 | Status: SHIPPED | OUTPATIENT
Start: 2025-02-06

## 2025-02-06 NOTE — PROGRESS NOTES
Patient/EMS calluses: absent  Edema: right- negative, left- negative     Sensory exam:  Monofilament sensation: abnormal on the right, normal on the left  (minimum of 5 random plantar locations tested, avoiding callused areas - > 1 area with absence of sensation is + for neuropathy)     Plus at least one of the following:  Pulses: normal  Proprioception: normal on the right, normal on the left  Vibration (128 Hz): absent on the right, decreased on the left  High risk feet    ASSESSMENT/PLAN:  1. Type 2 diabetes, controlled, with neuropathy (HCC)  HbA1C 13.1-7.6-6.3-6.4-6.6-6.9-5.9-6.2-6.0-5.9-6.0  B12 [phone]  Continue B12 supplement  - Jardiance 25 mg daily  - Ozempic 1 mg weekly  - metFORMIN (GLUCOPHAGE) 1000 MG tablet; Take 1 tablet nightly   - Hemoglobin A1C; Future  - Comprehensive Metabolic Panel; Future  - Microalbumin / Creatinine Urine Ratio; Future    2. Mixed hyperlipidemia  [id number]  [id number]  HDL 15-61-40-16-94-85-53-92-50-31-26-27  Rosuvastatin 40 mg daily  Diet.   Patient has muscle weakness, pain and cramping. Has Parkinson's.  - stop fenofibrate  - Vascepa 2 caps bid  - Lipid Panel; Future    3. Hypogonadotropic hypogonadism (HCC)  Had BW after injection. Repeat between injections, call for results.  Resume testosterone if normal.  Testosterone [phone]->[id number]  Hemoglobin 16.6-16.3-16.2-17-16.1-16.0  Hematocrit 51.4-47.9-49.2-51-47.7-48.8  - CBC; Future  - Testosterone, free, total; Future    4. Vitamin D deficiency  25OH vit D 40-31-67.7-52.3-35.1-50.5-58.3-51.4-58.6-56.6-42.6  Supplements vitamin D 5000 IU daily in winter, every other day in summer  - Vitamin D 25 Hydroxy; Future    5. Overweight  Diet, exercise.    6.  Nephropathy  Follow micr/creat ratio.  Lisinopril 10 mg qd    7. Goiter  2019 Thyroid sono did not show nodules.  Unlikely difficulty swallowing and hoarseness are related to

## 2025-05-06 DIAGNOSIS — E23.0 HYPOGONADOTROPIC HYPOGONADISM: ICD-10-CM

## 2025-05-06 DIAGNOSIS — E11.21 CONTROLLED TYPE 2 DIABETES MELLITUS WITH DIABETIC NEPHROPATHY, WITH LONG-TERM CURRENT USE OF INSULIN (HCC): ICD-10-CM

## 2025-05-06 DIAGNOSIS — E11.40 TYPE 2 DIABETES, CONTROLLED, WITH NEUROPATHY (HCC): ICD-10-CM

## 2025-05-06 DIAGNOSIS — E55.9 VITAMIN D DEFICIENCY: ICD-10-CM

## 2025-05-06 DIAGNOSIS — E83.52 HYPERCALCEMIA: ICD-10-CM

## 2025-05-06 DIAGNOSIS — E78.2 MIXED HYPERLIPIDEMIA: ICD-10-CM

## 2025-05-06 DIAGNOSIS — Z79.4 CONTROLLED TYPE 2 DIABETES MELLITUS WITH DIABETIC NEPHROPATHY, WITH LONG-TERM CURRENT USE OF INSULIN (HCC): ICD-10-CM

## 2025-05-07 LAB
25(OH)D3 SERPL-MCNC: 47 NG/ML
ALBUMIN SERPL-MCNC: 4.8 G/DL (ref 3.4–5)
ALBUMIN/GLOB SERPL: 2.1 {RATIO} (ref 1.1–2.2)
ALP SERPL-CCNC: 78 U/L (ref 40–129)
ALT SERPL-CCNC: 11 U/L (ref 10–40)
ANION GAP SERPL CALCULATED.3IONS-SCNC: 13 MMOL/L (ref 3–16)
AST SERPL-CCNC: 19 U/L (ref 15–37)
BILIRUB SERPL-MCNC: 0.5 MG/DL (ref 0–1)
BUN SERPL-MCNC: 14 MG/DL (ref 7–20)
CALCIUM SERPL-MCNC: 10.1 MG/DL (ref 8.3–10.6)
CHLORIDE SERPL-SCNC: 101 MMOL/L (ref 99–110)
CHOLEST SERPL-MCNC: 78 MG/DL (ref 0–199)
CO2 SERPL-SCNC: 26 MMOL/L (ref 21–32)
CREAT SERPL-MCNC: 0.9 MG/DL (ref 0.8–1.3)
CREAT UR-MCNC: 81.2 MG/DL (ref 39–259)
DEPRECATED RDW RBC AUTO: 15.7 % (ref 12.4–15.4)
EST. AVERAGE GLUCOSE BLD GHB EST-MCNC: 131.2 MG/DL
FOLATE SERPL-MCNC: 14.1 NG/ML (ref 4.78–24.2)
GFR SERPLBLD CREATININE-BSD FMLA CKD-EPI: >90 ML/MIN/{1.73_M2}
GLUCOSE SERPL-MCNC: 112 MG/DL (ref 70–99)
HBA1C MFR BLD: 6.2 %
HCT VFR BLD AUTO: 49.9 % (ref 40.5–52.5)
HDLC SERPL-MCNC: 33 MG/DL (ref 40–60)
HGB BLD-MCNC: 16.8 G/DL (ref 13.5–17.5)
LDL CHOLESTEROL: 27 MG/DL
MCH RBC QN AUTO: 29.4 PG (ref 26–34)
MCHC RBC AUTO-ENTMCNC: 33.6 G/DL (ref 31–36)
MCV RBC AUTO: 87.5 FL (ref 80–100)
MICROALBUMIN UR DL<=1MG/L-MCNC: 5.06 MG/DL
MICROALBUMIN/CREAT UR: 62.3 MG/G (ref 0–30)
PLATELET # BLD AUTO: 218 K/UL (ref 135–450)
PMV BLD AUTO: 8.2 FL (ref 5–10.5)
POTASSIUM SERPL-SCNC: 4.9 MMOL/L (ref 3.5–5.1)
PROT SERPL-MCNC: 7.1 G/DL (ref 6.4–8.2)
RBC # BLD AUTO: 5.7 M/UL (ref 4.2–5.9)
SODIUM SERPL-SCNC: 140 MMOL/L (ref 136–145)
T4 FREE SERPL-MCNC: 1.3 NG/DL (ref 0.9–1.8)
TRIGL SERPL-MCNC: 91 MG/DL (ref 0–150)
TSH SERPL DL<=0.005 MIU/L-ACNC: 0.89 UIU/ML (ref 0.27–4.2)
VIT B12 SERPL-MCNC: 1009 PG/ML (ref 211–911)
VLDLC SERPL CALC-MCNC: 18 MG/DL
WBC # BLD AUTO: 9.4 K/UL (ref 4–11)

## 2025-05-08 LAB
SHBG SERPL-SCNC: 27 NMOL/L (ref 19–76)
TESTOST FREE SERPL-MCNC: 246.1 PG/ML (ref 47–244)
TESTOST SERPL-MCNC: 935 NG/DL (ref 193–740)

## 2025-05-13 ENCOUNTER — OFFICE VISIT (OUTPATIENT)
Dept: ENDOCRINOLOGY | Age: 70
End: 2025-05-13
Payer: MEDICARE

## 2025-05-13 VITALS
HEIGHT: 70 IN | BODY MASS INDEX: 27.92 KG/M2 | WEIGHT: 195 LBS | RESPIRATION RATE: 14 BRPM | DIASTOLIC BLOOD PRESSURE: 68 MMHG | HEART RATE: 79 BPM | TEMPERATURE: 98 F | OXYGEN SATURATION: 95 % | SYSTOLIC BLOOD PRESSURE: 127 MMHG

## 2025-05-13 DIAGNOSIS — E66.3 OVERWEIGHT (BMI 25.0-29.9): ICD-10-CM

## 2025-05-13 DIAGNOSIS — E78.2 MIXED HYPERLIPIDEMIA: ICD-10-CM

## 2025-05-13 DIAGNOSIS — E11.40 TYPE 2 DIABETES, CONTROLLED, WITH NEUROPATHY (HCC): Primary | ICD-10-CM

## 2025-05-13 DIAGNOSIS — E23.0 HYPOGONADOTROPIC HYPOGONADISM: ICD-10-CM

## 2025-05-13 DIAGNOSIS — E83.52 HYPERCALCEMIA: ICD-10-CM

## 2025-05-13 DIAGNOSIS — E11.21 CONTROLLED TYPE 2 DIABETES MELLITUS WITH DIABETIC NEPHROPATHY, WITH LONG-TERM CURRENT USE OF INSULIN (HCC): ICD-10-CM

## 2025-05-13 DIAGNOSIS — Z79.4 CONTROLLED TYPE 2 DIABETES MELLITUS WITH DIABETIC NEPHROPATHY, WITH LONG-TERM CURRENT USE OF INSULIN (HCC): ICD-10-CM

## 2025-05-13 DIAGNOSIS — E55.9 VITAMIN D DEFICIENCY: ICD-10-CM

## 2025-05-13 DIAGNOSIS — E04.9 GOITER: ICD-10-CM

## 2025-05-13 PROCEDURE — 1160F RVW MEDS BY RX/DR IN RCRD: CPT | Performed by: INTERNAL MEDICINE

## 2025-05-13 PROCEDURE — 3044F HG A1C LEVEL LT 7.0%: CPT | Performed by: INTERNAL MEDICINE

## 2025-05-13 PROCEDURE — 2022F DILAT RTA XM EVC RTNOPTHY: CPT | Performed by: INTERNAL MEDICINE

## 2025-05-13 PROCEDURE — 99214 OFFICE O/P EST MOD 30 MIN: CPT | Performed by: INTERNAL MEDICINE

## 2025-05-13 PROCEDURE — 1036F TOBACCO NON-USER: CPT | Performed by: INTERNAL MEDICINE

## 2025-05-13 PROCEDURE — 3017F COLORECTAL CA SCREEN DOC REV: CPT | Performed by: INTERNAL MEDICINE

## 2025-05-13 PROCEDURE — 1159F MED LIST DOCD IN RCRD: CPT | Performed by: INTERNAL MEDICINE

## 2025-05-13 PROCEDURE — G8417 CALC BMI ABV UP PARAM F/U: HCPCS | Performed by: INTERNAL MEDICINE

## 2025-05-13 PROCEDURE — G8427 DOCREV CUR MEDS BY ELIG CLIN: HCPCS | Performed by: INTERNAL MEDICINE

## 2025-05-13 PROCEDURE — G2211 COMPLEX E/M VISIT ADD ON: HCPCS | Performed by: INTERNAL MEDICINE

## 2025-05-13 PROCEDURE — 1123F ACP DISCUSS/DSCN MKR DOCD: CPT | Performed by: INTERNAL MEDICINE

## 2025-05-13 RX ORDER — FENOFIBRATE 160 MG/1
160 TABLET ORAL DAILY
Qty: 90 TABLET | Refills: 1 | Status: SHIPPED | OUTPATIENT
Start: 2025-05-13

## 2025-05-13 RX ORDER — ROSUVASTATIN CALCIUM 40 MG/1
40 TABLET, COATED ORAL DAILY
Qty: 90 TABLET | Refills: 1 | Status: SHIPPED | OUTPATIENT
Start: 2025-05-13

## 2025-05-13 RX ORDER — LISINOPRIL 10 MG/1
10 TABLET ORAL DAILY
Qty: 90 TABLET | Refills: 1 | Status: SHIPPED | OUTPATIENT
Start: 2025-05-13

## 2025-05-13 RX ORDER — ICOSAPENT ETHYL 1 G/1
CAPSULE ORAL
Qty: 360 CAPSULE | Refills: 1 | Status: SHIPPED | OUTPATIENT
Start: 2025-05-13

## 2025-05-13 RX ORDER — SYRINGE WITH NEEDLE, 1 ML 25GX5/8"
1 SYRINGE, EMPTY DISPOSABLE MISCELLANEOUS
Qty: 100 EACH | Refills: 3 | Status: SHIPPED | OUTPATIENT
Start: 2025-05-13

## 2025-05-13 RX ORDER — TESTOSTERONE CYPIONATE 200 MG/ML
INJECTION, SOLUTION INTRAMUSCULAR
Qty: 10 ML | Refills: 1 | Status: SHIPPED | OUTPATIENT
Start: 2025-05-13 | End: 2027-03-02

## (undated) DEVICE — GARMENT,MEDLINE,DVT,INT,CALF,MED, GEN2: Brand: MEDLINE

## (undated) DEVICE — GLOVE ORANGE PI 7   MSG9070

## (undated) DEVICE — TUBING PMP L16FT MAIN DISP FOR AR-6400 AR-6475

## (undated) DEVICE — GOWN,SIRUS,POLYRNF,BRTHSLV,XL,30/CS: Brand: MEDLINE

## (undated) DEVICE — DRESSING,GAUZE,XEROFORM,CURAD,1"X8",ST: Brand: CURAD

## (undated) DEVICE — SOLUTION IRRIG 3000ML LAC R FLX CONT

## (undated) DEVICE — PACK PROCEDURE SURG ARTHROSCOPY

## (undated) DEVICE — APPLICATOR MEDICATED 26 CC SOLUTION HI LT ORNG CHLORAPREP

## (undated) DEVICE — BLADE SHV L13CM DIA4MM TAPR TIP SCIS LIKE CUT OVL OUTER

## (undated) DEVICE — BLADE SHV L13CM DIA4MM EXCALIBUR AGG COOLCUT

## (undated) DEVICE — TUBING FLD MGMT Y DBL SPIK DUALWAVE

## (undated) DEVICE — GLOVE ORTHO 7   MSG9470

## (undated) DEVICE — PAD,NON-ADHERENT,3X8,STERILE,LF,1/PK: Brand: MEDLINE

## (undated) DEVICE — JEWISH HOSPITAL TURNOVER KIT: Brand: MEDLINE INDUSTRIES, INC.

## (undated) DEVICE — GLOVE SURG SZ 7 L12IN THK7.5MIL DK GRN LTX FREE MSG6570] MEDLINE INDUSTRIES INC]

## (undated) DEVICE — 3M™ COBAN™ NL STERILE NON-LATEX SELF-ADHERENT WRAP, 2086S, 6 IN X 5 YD (15 CM X 4,5 M), 12 ROLLS/CASE: Brand: 3M™ COBAN™

## (undated) DEVICE — SURE SET-DOUBLE BASIN-LF: Brand: MEDLINE INDUSTRIES, INC.

## (undated) DEVICE — TUBING PMP L6FT CONT WAVE EXTN

## (undated) DEVICE — COVER LT HNDL BLU PLAS

## (undated) DEVICE — SUTURE ETHLN SZ 3-0 L18IN NONABSORBABLE BLK FS-1 L24MM 3/8 663H

## (undated) DEVICE — PAD DRY FLOOR ABS 32X58IN GRN

## (undated) DEVICE — SYSTEM SKIN CLSR 22CM DERMBND PRINEO